# Patient Record
Sex: FEMALE | Race: BLACK OR AFRICAN AMERICAN | Employment: FULL TIME | URBAN - METROPOLITAN AREA
[De-identification: names, ages, dates, MRNs, and addresses within clinical notes are randomized per-mention and may not be internally consistent; named-entity substitution may affect disease eponyms.]

---

## 2017-01-31 ENCOUNTER — OFFICE VISIT (OUTPATIENT)
Dept: FAMILY MEDICINE CLINIC | Facility: CLINIC | Age: 52
End: 2017-01-31

## 2017-01-31 VITALS
HEART RATE: 81 BPM | SYSTOLIC BLOOD PRESSURE: 111 MMHG | HEIGHT: 66 IN | RESPIRATION RATE: 20 BRPM | WEIGHT: 142 LBS | DIASTOLIC BLOOD PRESSURE: 75 MMHG | TEMPERATURE: 98 F | BODY MASS INDEX: 22.82 KG/M2

## 2017-01-31 DIAGNOSIS — H66.92 ACUTE EAR INFECTION, LEFT: ICD-10-CM

## 2017-01-31 DIAGNOSIS — J06.9 ACUTE URI: ICD-10-CM

## 2017-01-31 PROCEDURE — 99213 OFFICE O/P EST LOW 20 MIN: CPT | Performed by: FAMILY MEDICINE

## 2017-01-31 PROCEDURE — 99212 OFFICE O/P EST SF 10 MIN: CPT | Performed by: FAMILY MEDICINE

## 2017-01-31 RX ORDER — AMOXICILLIN 875 MG/1
875 TABLET, COATED ORAL 2 TIMES DAILY
Qty: 20 TABLET | Refills: 0 | Status: SHIPPED | OUTPATIENT
Start: 2017-01-31 | End: 2017-10-04

## 2017-01-31 NOTE — PROGRESS NOTES
HPI:    Patient ID: Alberto Neumann is a 46year old female. HPI Comments: Pt presents with cold symptoms for 4-5 days. Pt has had cough, sore throat and left ear pain. No fevers. Pt has tried otc remedies without relief. Pt states no sick contacts. (875 mg total) by mouth 2 (two) times daily.            Imaging & Referrals:  None       PM#[id number]

## 2017-08-28 ENCOUNTER — APPOINTMENT (OUTPATIENT)
Dept: CT IMAGING | Facility: HOSPITAL | Age: 52
End: 2017-08-28
Attending: EMERGENCY MEDICINE
Payer: COMMERCIAL

## 2017-08-28 ENCOUNTER — HOSPITAL ENCOUNTER (EMERGENCY)
Facility: HOSPITAL | Age: 52
Discharge: HOME | End: 2017-08-28
Attending: EMERGENCY MEDICINE
Payer: COMMERCIAL

## 2017-08-28 VITALS
HEIGHT: 66 IN | RESPIRATION RATE: 16 BRPM | BODY MASS INDEX: 22.02 KG/M2 | SYSTOLIC BLOOD PRESSURE: 108 MMHG | DIASTOLIC BLOOD PRESSURE: 69 MMHG | HEART RATE: 85 BPM | OXYGEN SATURATION: 97 % | WEIGHT: 137 LBS

## 2017-08-28 DIAGNOSIS — R10.9 ABDOMINAL PAIN, ACUTE: Primary | ICD-10-CM

## 2017-08-28 LAB
ANION GAP SERPL CALC-SCNC: 8 MMOL/L (ref 0–18)
BASOPHILS # BLD: 0.1 K/UL (ref 0–0.2)
BASOPHILS NFR BLD: 1 %
BILIRUB UR QL: NEGATIVE
BUN SERPL-MCNC: 8 MG/DL (ref 8–20)
BUN/CREAT SERPL: 10.8 (ref 10–20)
CALCIUM SERPL-MCNC: 9.4 MG/DL (ref 8.5–10.5)
CHLORIDE SERPL-SCNC: 107 MMOL/L (ref 95–110)
CO2 SERPL-SCNC: 26 MMOL/L (ref 22–32)
COLOR UR: YELLOW
CREAT SERPL-MCNC: 0.74 MG/DL (ref 0.5–1.5)
EOSINOPHIL # BLD: 0.2 K/UL (ref 0–0.7)
EOSINOPHIL NFR BLD: 2 %
ERYTHROCYTE [DISTWIDTH] IN BLOOD BY AUTOMATED COUNT: 13.3 % (ref 11–15)
GLUCOSE SERPL-MCNC: 102 MG/DL (ref 70–99)
GLUCOSE UR-MCNC: NEGATIVE MG/DL
HCT VFR BLD AUTO: 38 % (ref 35–48)
HGB BLD-MCNC: 12.7 G/DL (ref 12–16)
HGB UR QL STRIP.AUTO: NEGATIVE
LEUKOCYTE ESTERASE UR QL STRIP.AUTO: NEGATIVE
LYMPHOCYTES # BLD: 1.1 K/UL (ref 1–4)
LYMPHOCYTES NFR BLD: 11 %
MCH RBC QN AUTO: 30 PG (ref 27–32)
MCHC RBC AUTO-ENTMCNC: 33.4 G/DL (ref 32–37)
MCV RBC AUTO: 89.8 FL (ref 80–100)
MONOCYTES # BLD: 1.1 K/UL (ref 0–1)
MONOCYTES NFR BLD: 11 %
NEUTROPHILS # BLD AUTO: 7.6 K/UL (ref 1.8–7.7)
NEUTROPHILS NFR BLD: 76 %
NITRITE UR QL STRIP.AUTO: NEGATIVE
OSMOLALITY UR CALC.SUM OF ELEC: 291 MOSM/KG (ref 275–295)
PH UR: 5 [PH] (ref 5–8)
PLATELET # BLD AUTO: 229 K/UL (ref 140–400)
PMV BLD AUTO: 7 FL (ref 7.4–10.3)
POTASSIUM SERPL-SCNC: 3.9 MMOL/L (ref 3.3–5.1)
PROT UR-MCNC: 30 MG/DL
RBC # BLD AUTO: 4.24 M/UL (ref 3.7–5.4)
RBC #/AREA URNS AUTO: 7 /HPF
SODIUM SERPL-SCNC: 141 MMOL/L (ref 136–144)
SP GR UR STRIP: 1.03 (ref 1–1.03)
UROBILINOGEN UR STRIP-ACNC: <2
VIT C UR-MCNC: 40 MG/DL
WBC # BLD AUTO: 10 K/UL (ref 4–11)
WBC #/AREA URNS AUTO: <1 /HPF

## 2017-08-28 PROCEDURE — 81001 URINALYSIS AUTO W/SCOPE: CPT | Performed by: EMERGENCY MEDICINE

## 2017-08-28 PROCEDURE — 80048 BASIC METABOLIC PNL TOTAL CA: CPT | Performed by: EMERGENCY MEDICINE

## 2017-08-28 PROCEDURE — 96374 THER/PROPH/DIAG INJ IV PUSH: CPT

## 2017-08-28 PROCEDURE — 96376 TX/PRO/DX INJ SAME DRUG ADON: CPT

## 2017-08-28 PROCEDURE — 74177 CT ABD & PELVIS W/CONTRAST: CPT | Performed by: EMERGENCY MEDICINE

## 2017-08-28 PROCEDURE — 85025 COMPLETE CBC W/AUTO DIFF WBC: CPT | Performed by: EMERGENCY MEDICINE

## 2017-08-28 PROCEDURE — 99284 EMERGENCY DEPT VISIT MOD MDM: CPT

## 2017-08-28 PROCEDURE — 96375 TX/PRO/DX INJ NEW DRUG ADDON: CPT

## 2017-08-28 RX ORDER — MORPHINE SULFATE 4 MG/ML
4 INJECTION, SOLUTION INTRAMUSCULAR; INTRAVENOUS ONCE
Status: COMPLETED | OUTPATIENT
Start: 2017-08-28 | End: 2017-08-28

## 2017-08-28 RX ORDER — MORPHINE SULFATE 4 MG/ML
INJECTION, SOLUTION INTRAMUSCULAR; INTRAVENOUS
Status: DISCONTINUED
Start: 2017-08-28 | End: 2017-08-28

## 2017-08-28 RX ORDER — CIPROFLOXACIN 500 MG/1
500 TABLET, FILM COATED ORAL 2 TIMES DAILY
Qty: 20 TABLET | Refills: 0 | Status: SHIPPED | OUTPATIENT
Start: 2017-08-28 | End: 2017-10-20

## 2017-08-28 RX ORDER — ONDANSETRON 2 MG/ML
4 INJECTION INTRAMUSCULAR; INTRAVENOUS ONCE
Status: COMPLETED | OUTPATIENT
Start: 2017-08-28 | End: 2017-08-28

## 2017-08-28 RX ORDER — METRONIDAZOLE 500 MG/1
500 TABLET ORAL 3 TIMES DAILY
Qty: 30 TABLET | Refills: 0 | Status: SHIPPED | OUTPATIENT
Start: 2017-08-28 | End: 2017-10-20

## 2017-08-28 RX ORDER — HYDROCODONE BITARTRATE AND ACETAMINOPHEN 5; 325 MG/1; MG/1
1-2 TABLET ORAL EVERY 4 HOURS PRN
Qty: 20 TABLET | Refills: 0 | Status: SHIPPED | OUTPATIENT
Start: 2017-08-28 | End: 2017-09-04

## 2017-08-28 NOTE — ED PROVIDER NOTES
Patient signed out to me pending CT scan. Mild diverticulitis on CT scan, normal labs and no fever. Abdomen soft with no rebound or guarding.   Patient comfortable with plan for discharge home with oral antibiotics Norco as needed for pain but MiraLAX for

## 2017-08-28 NOTE — ED PROVIDER NOTES
Patient Seen in: [facility] AND [facility] Emergency Department    History   Patient presents with:  Abdomen/Flank Pain (GI/)    Stated Complaint: abd pain    HPI    47-year-old female with abdominal pain that began on Friday and has worsened since the onset. Wt 62.1 kg   LMP 06/29/2015   SpO2 97%   BMI 22.11 kg/m²         Physical Exam   Constitutional: She is oriented to person, place, and time. She appears well-developed and well-nourished. No distress. HENT:   Head: Normocephalic and atraumatic.    Mouth/T Status                     ---------                               -----------         ------                     CBC W/ DIFFERENTIAL[[id number]]          Abnormal            Final result                 Please view results for these tests on the individual

## 2017-08-28 NOTE — ED INITIAL ASSESSMENT (HPI)
LLQ abdominal pain since Friday- worsening tonight. +nausea, denies any fever/chills, no urinary complaints.

## 2017-08-28 NOTE — ED NOTES
Pt safe to d/c home per MD, able to dress self.  D/C teaching completed, pt verbalizes understanding, ambulatory with steady gait to exit

## 2017-09-01 ENCOUNTER — TELEPHONE (OUTPATIENT)
Dept: GASTROENTEROLOGY | Facility: CLINIC | Age: 52
End: 2017-09-01

## 2017-09-01 NOTE — TELEPHONE ENCOUNTER
Pt states that she was in ER on 8/28/17 due to diverticulitis attack and was told to follow up with PL. No appts available with PL or [facility]. Pt also states she has not had bowel movement since last Saturday. Please call.

## 2017-09-01 NOTE — TELEPHONE ENCOUNTER
Pt contacted and she states she presented to the ED on 8/28/17 for diverticulitis attack. She was d/c with metronidazole 500mg TID and ciprofloxacin 500mg BID. CT a/p demonstrated diverticulitis of the sigmoid colon w/o abcess or perforation.   She states s

## 2017-09-01 NOTE — TELEPHONE ENCOUNTER
Discussed w/ Dr. Pineda VELEZ for pt to keep 9/21/17 @ 4:30pm appt. I called pt and she was agreeable, pt added to MD schedule, directions provided to the [facility], and told to arrive 15 mins earlier.

## 2017-09-01 NOTE — TELEPHONE ENCOUNTER
The patient was contacted. She actually is feeling much better on the antibiotics. She was out eating a light dinner this evening. She does have some nausea.   She was concerned about taking 2 antibiotics but I reassured her that this is the standard of

## 2017-09-21 ENCOUNTER — OFFICE VISIT (OUTPATIENT)
Dept: GASTROENTEROLOGY | Facility: CLINIC | Age: 52
End: 2017-09-21

## 2017-09-21 VITALS
DIASTOLIC BLOOD PRESSURE: 93 MMHG | SYSTOLIC BLOOD PRESSURE: 133 MMHG | HEART RATE: 88 BPM | BODY MASS INDEX: 22.63 KG/M2 | HEIGHT: 66 IN | WEIGHT: 140.81 LBS

## 2017-09-21 DIAGNOSIS — K57.32 DIVERTICULITIS OF LARGE INTESTINE WITHOUT PERFORATION OR ABSCESS WITHOUT BLEEDING: Primary | ICD-10-CM

## 2017-09-21 PROCEDURE — 99212 OFFICE O/P EST SF 10 MIN: CPT | Performed by: INTERNAL MEDICINE

## 2017-09-21 PROCEDURE — 99213 OFFICE O/P EST LOW 20 MIN: CPT | Performed by: INTERNAL MEDICINE

## 2017-09-21 NOTE — PATIENT INSTRUCTIONS
Diverticulitis  - call if symptoms return- we can call in antibiotics before it gets too bad  - continue on high fiber diet  - options discussed if ongoing episodes

## 2017-09-21 NOTE — PROGRESS NOTES
Pat Player is a 45year old female. HPI:   Patient presents with:  Hospital F/U: diverticulitis. No current complaints. The patient is a 20-year-old female who presents after episode of recurrent diverticulitis.   She was in the emergency room diverticulitis. This current episode was uncomplicated and treated with outpatient oral antibiotics. The patient's last colonoscopy was 2 years ago. The patient currently denies any abdominal pain. She has occasional looser stools.     Plan- we discus

## 2017-10-04 ENCOUNTER — OFFICE VISIT (OUTPATIENT)
Dept: FAMILY MEDICINE CLINIC | Facility: CLINIC | Age: 52
End: 2017-10-04

## 2017-10-04 VITALS
SYSTOLIC BLOOD PRESSURE: 117 MMHG | WEIGHT: 142 LBS | HEIGHT: 66 IN | HEART RATE: 77 BPM | BODY MASS INDEX: 22.82 KG/M2 | RESPIRATION RATE: 18 BRPM | DIASTOLIC BLOOD PRESSURE: 77 MMHG | TEMPERATURE: 98 F

## 2017-10-04 DIAGNOSIS — J01.90 ACUTE SINUSITIS, RECURRENCE NOT SPECIFIED, UNSPECIFIED LOCATION: ICD-10-CM

## 2017-10-04 PROCEDURE — 99212 OFFICE O/P EST SF 10 MIN: CPT | Performed by: FAMILY MEDICINE

## 2017-10-04 PROCEDURE — 99213 OFFICE O/P EST LOW 20 MIN: CPT | Performed by: FAMILY MEDICINE

## 2017-10-04 RX ORDER — AMOXICILLIN 875 MG/1
875 TABLET, COATED ORAL 2 TIMES DAILY
Qty: 20 TABLET | Refills: 0 | Status: SHIPPED | OUTPATIENT
Start: 2017-10-04 | End: 2017-11-07 | Stop reason: ALTCHOICE

## 2017-10-04 NOTE — PROGRESS NOTES
HPI:    Patient ID: Jacinta Alvarez is a 45year old female. Pt presents with cold symptoms for several days. Pt has had cough, PND, rhinorrhea, sore throat. No fevers. Pt has tried otc remedies without consistent relief. Pt states sick contacts at work.

## 2017-10-20 ENCOUNTER — TELEPHONE (OUTPATIENT)
Dept: GASTROENTEROLOGY | Facility: CLINIC | Age: 52
End: 2017-10-20

## 2017-10-20 RX ORDER — METRONIDAZOLE 500 MG/1
500 TABLET ORAL 3 TIMES DAILY
Qty: 30 TABLET | Refills: 0 | Status: SHIPPED | OUTPATIENT
Start: 2017-10-20 | End: 2017-10-30

## 2017-10-20 RX ORDER — CIPROFLOXACIN 500 MG/1
500 TABLET, FILM COATED ORAL 2 TIMES DAILY
Qty: 20 TABLET | Refills: 0 | Status: SHIPPED | OUTPATIENT
Start: 2017-10-20 | End: 2017-10-30

## 2017-10-20 NOTE — TELEPHONE ENCOUNTER
The patient was contacted, she has been experiencing bloating and abdominal pain is similar to her prior presentation with diverticulitis in the sigmoid colon. She feels it is very early.     Plan  -[place] diet, go to liquids if necessary  -Cipro and Flagyl

## 2017-10-20 NOTE — TELEPHONE ENCOUNTER
Spoke to the pt over the phone. She thinks she is having a diverticulitis flare.      Her last attack was 09/01/17    She ws treated with Cipro and Flagyl    She feels bloated     She feels the urge to have a bowel movement but can't completely evacuate, no

## 2017-10-20 NOTE — TELEPHONE ENCOUNTER
Pt states that she is very bloated and is having problems having bowel movement and suspects that it might be the beginning of a diverticulitis flare. Please call.

## 2017-10-24 ENCOUNTER — OFFICE VISIT (OUTPATIENT)
Dept: FAMILY MEDICINE CLINIC | Facility: CLINIC | Age: 52
End: 2017-10-24

## 2017-10-24 VITALS
BODY MASS INDEX: 22.82 KG/M2 | WEIGHT: 142 LBS | TEMPERATURE: 98 F | RESPIRATION RATE: 20 BRPM | SYSTOLIC BLOOD PRESSURE: 119 MMHG | HEART RATE: 101 BPM | HEIGHT: 66 IN | DIASTOLIC BLOOD PRESSURE: 72 MMHG

## 2017-10-24 DIAGNOSIS — J06.9 ACUTE URI: ICD-10-CM

## 2017-10-24 PROCEDURE — 99213 OFFICE O/P EST LOW 20 MIN: CPT | Performed by: FAMILY MEDICINE

## 2017-10-24 PROCEDURE — 99212 OFFICE O/P EST SF 10 MIN: CPT | Performed by: FAMILY MEDICINE

## 2017-10-24 RX ORDER — CODEINE PHOSPHATE AND GUAIFENESIN 10; 100 MG/5ML; MG/5ML
5 SOLUTION ORAL EVERY 6 HOURS PRN
Qty: 140 ML | Refills: 0 | Status: SHIPPED | OUTPATIENT
Start: 2017-10-24 | End: 2017-11-07 | Stop reason: ALTCHOICE

## 2017-11-06 NOTE — PROGRESS NOTES
[facility] Follow-up Visit    Angelica abdominal pain, nausea, vomiting, dyspepsia, dysphagia, odynophagia. Denies change in appetite or unexpected weight loss. Denies chest pain or shortness of breath.     Patient reports her recent diverticulitis episodes seem to occur around the same time h of breath  CARDIOVASCULAR:  negative for  chest pain  GASTROINTESTINAL:  see HPI  GENITOURINARY:  negative for dysuria and hematuria   SKIN:  negative for  rash  ALLERGIC/IMMUNOLOGIC:  negative for hay fever or seasonal allergies  ENDOCRINE:  negative for dietary/over-the-counter fiber products. Given the patient's recent 2 episodes of diverticulitis in such close proximity, would recommend that the patient follow-up with general surgery to reevaluate possible surgical options.   Reviewed this patient's agnes

## 2017-11-07 ENCOUNTER — OFFICE VISIT (OUTPATIENT)
Dept: GASTROENTEROLOGY | Facility: CLINIC | Age: 52
End: 2017-11-07

## 2017-11-07 VITALS
DIASTOLIC BLOOD PRESSURE: 80 MMHG | HEIGHT: 66 IN | BODY MASS INDEX: 23.46 KG/M2 | HEART RATE: 99 BPM | SYSTOLIC BLOOD PRESSURE: 118 MMHG | WEIGHT: 146 LBS

## 2017-11-07 DIAGNOSIS — Z87.19 HISTORY OF DIVERTICULITIS: Primary | ICD-10-CM

## 2017-11-07 PROCEDURE — 99213 OFFICE O/P EST LOW 20 MIN: CPT | Performed by: NURSE PRACTITIONER

## 2017-11-07 PROCEDURE — 99212 OFFICE O/P EST SF 10 MIN: CPT | Performed by: NURSE PRACTITIONER

## 2017-11-07 NOTE — PATIENT INSTRUCTIONS
1.  Continue a low residue diet for at least the next few weeks and then gradually reintroduce dietary/over-the-counter fiber  2.   Follow-up with general surgery to see if they have any additional recommendations in light of her recent diverticulitis episo

## 2017-11-28 ENCOUNTER — NURSE TRIAGE (OUTPATIENT)
Dept: OTHER | Age: 52
End: 2017-11-28

## 2017-11-28 NOTE — TELEPHONE ENCOUNTER
Action Requested: Summary for Provider     []  Critical Lab, Recommendations Needed  [] Need Additional Advice  [x]   FYI    []   Need Orders  [] Need Medications Sent to Pharmacy  []  Other     SUMMARY: Patient stated she will go to Urgent Care in [address] [place] [address]

## 2017-11-29 NOTE — TELEPHONE ENCOUNTER
Pt contacted (Name and  verified) and states that she did go to her local urgent care and was told that she has a bladder infection and was prescribed Bactrim 160 mg BID x 10 days and another \"small orange pill\" for pain.     Pt states that she is feel

## 2017-12-21 ENCOUNTER — OFFICE VISIT (OUTPATIENT)
Dept: FAMILY MEDICINE CLINIC | Facility: CLINIC | Age: 52
End: 2017-12-21

## 2017-12-21 VITALS
TEMPERATURE: 98 F | HEART RATE: 89 BPM | SYSTOLIC BLOOD PRESSURE: 102 MMHG | BODY MASS INDEX: 24.11 KG/M2 | RESPIRATION RATE: 18 BRPM | HEIGHT: 66 IN | WEIGHT: 150 LBS | DIASTOLIC BLOOD PRESSURE: 70 MMHG

## 2017-12-21 DIAGNOSIS — J01.90 ACUTE SINUSITIS, RECURRENCE NOT SPECIFIED, UNSPECIFIED LOCATION: ICD-10-CM

## 2017-12-21 PROCEDURE — 99213 OFFICE O/P EST LOW 20 MIN: CPT | Performed by: FAMILY MEDICINE

## 2017-12-21 PROCEDURE — 99212 OFFICE O/P EST SF 10 MIN: CPT | Performed by: FAMILY MEDICINE

## 2017-12-21 RX ORDER — AMOXICILLIN 875 MG/1
875 TABLET, COATED ORAL 2 TIMES DAILY
Qty: 20 TABLET | Refills: 0 | Status: SHIPPED | OUTPATIENT
Start: 2017-12-21 | End: 2018-02-21

## 2017-12-21 NOTE — PROGRESS NOTES
HPI:    Patient ID: Eleuterio Romero is a 45year old female. Pt presents with cold  Sinus symptoms for 2-3 days. Pt has had sinus congestion, sore throat and episode of vomiting today. No diarrhea but had fevers. Pt has tried otc remedies without relief. total) by mouth 2 (two) times daily.            Imaging & Referrals:  None       #[id number]

## 2018-02-21 ENCOUNTER — OFFICE VISIT (OUTPATIENT)
Dept: FAMILY MEDICINE CLINIC | Facility: CLINIC | Age: 53
End: 2018-02-21

## 2018-02-21 VITALS
BODY MASS INDEX: 23.63 KG/M2 | WEIGHT: 147 LBS | HEIGHT: 66 IN | DIASTOLIC BLOOD PRESSURE: 81 MMHG | TEMPERATURE: 98 F | SYSTOLIC BLOOD PRESSURE: 143 MMHG | RESPIRATION RATE: 22 BRPM | HEART RATE: 112 BPM

## 2018-02-21 DIAGNOSIS — S39.012A STRAIN OF LUMBAR REGION, INITIAL ENCOUNTER: Primary | ICD-10-CM

## 2018-02-21 PROCEDURE — 99212 OFFICE O/P EST SF 10 MIN: CPT | Performed by: FAMILY MEDICINE

## 2018-02-21 PROCEDURE — 99213 OFFICE O/P EST LOW 20 MIN: CPT | Performed by: FAMILY MEDICINE

## 2018-02-21 RX ORDER — HYDROCODONE BITARTRATE AND ACETAMINOPHEN 5; 325 MG/1; MG/1
1 TABLET ORAL EVERY 6 HOURS PRN
Qty: 30 TABLET | Refills: 0 | Status: SHIPPED | OUTPATIENT
Start: 2018-02-21 | End: 2018-04-13 | Stop reason: ALTCHOICE

## 2018-02-21 RX ORDER — CYCLOBENZAPRINE HCL 10 MG
10 TABLET ORAL NIGHTLY
Qty: 15 TABLET | Refills: 0 | Status: SHIPPED | OUTPATIENT
Start: 2018-02-21 | End: 2018-04-13 | Stop reason: ALTCHOICE

## 2018-02-21 NOTE — PROGRESS NOTES
HPI:    Patient ID: Fletcher Upton is a 45year old female. Pt presents with lower back pain for 1-2 weeks. Pt was exercising and doing a plank and felt a twinge of pain and went to a chiropractor and felt worse after treatment.  Pt has had aches and tig may causes sedation, constipation. Not to operate heavy machinery or drive on medication.            Imaging & Referrals:  None       NB#[id number]

## 2018-04-13 ENCOUNTER — NURSE TRIAGE (OUTPATIENT)
Dept: OTHER | Age: 53
End: 2018-04-13

## 2018-04-13 ENCOUNTER — HOSPITAL ENCOUNTER (OUTPATIENT)
Age: 53
Discharge: HOME | End: 2018-04-13
Attending: EMERGENCY MEDICINE
Payer: COMMERCIAL

## 2018-04-13 VITALS
TEMPERATURE: 98 F | DIASTOLIC BLOOD PRESSURE: 80 MMHG | HEART RATE: 88 BPM | RESPIRATION RATE: 18 BRPM | SYSTOLIC BLOOD PRESSURE: 118 MMHG

## 2018-04-13 DIAGNOSIS — N30.01 ACUTE CYSTITIS WITH HEMATURIA: Primary | ICD-10-CM

## 2018-04-13 PROCEDURE — 99214 OFFICE O/P EST MOD 30 MIN: CPT

## 2018-04-13 PROCEDURE — 87086 URINE CULTURE/COLONY COUNT: CPT | Performed by: EMERGENCY MEDICINE

## 2018-04-13 PROCEDURE — 81002 URINALYSIS NONAUTO W/O SCOPE: CPT

## 2018-04-13 RX ORDER — SULFAMETHOXAZOLE AND TRIMETHOPRIM 800; 160 MG/1; MG/1
1 TABLET ORAL 2 TIMES DAILY
Qty: 14 TABLET | Refills: 0 | Status: SHIPPED | OUTPATIENT
Start: 2018-04-13 | End: 2018-04-20

## 2018-04-13 NOTE — TELEPHONE ENCOUNTER
Action Requested: Summary for Provider     []  Critical Lab, Recommendations Needed  [] Need Additional Advice  []   FYI    []   Need Orders  [] Need Medications Sent to Pharmacy  []  Other     SUMMARY: Pt with 2 days urinary burning, frequency, urgency.  Wendy Lightning

## 2018-04-13 NOTE — ED PROVIDER NOTES
Patient Seen in: [facility] In [address] [place] [address]    History   Patient presents with:  Urinary Symptoms (urologic)    Stated Complaint: UTI Symptoms    HPI    The patient is a 68-year-old female with past history of diverticulitis who present tenderness  Skin: No pallor, no redness or warmth to the touch      ED Course     Labs Reviewed   [facility] POCT URINALYSIS DIPSTICK MANUAL - Abnormal; Notable for the following:        Result Value    Blood, Urine Trace-Intact (*)     Leukocyte esterase urine S

## 2018-04-21 ENCOUNTER — OFFICE VISIT (OUTPATIENT)
Dept: FAMILY MEDICINE CLINIC | Facility: CLINIC | Age: 53
End: 2018-04-21

## 2018-04-21 VITALS
TEMPERATURE: 98 F | SYSTOLIC BLOOD PRESSURE: 118 MMHG | WEIGHT: 146 LBS | DIASTOLIC BLOOD PRESSURE: 77 MMHG | RESPIRATION RATE: 20 BRPM | HEART RATE: 93 BPM | BODY MASS INDEX: 23.46 KG/M2 | HEIGHT: 66 IN

## 2018-04-21 DIAGNOSIS — Z12.31 SCREENING MAMMOGRAM, ENCOUNTER FOR: ICD-10-CM

## 2018-04-21 DIAGNOSIS — N76.0 BACTERIAL VAGINOSIS: ICD-10-CM

## 2018-04-21 DIAGNOSIS — Z00.00 ROUTINE PHYSICAL EXAMINATION: ICD-10-CM

## 2018-04-21 DIAGNOSIS — B96.89 BACTERIAL VAGINOSIS: ICD-10-CM

## 2018-04-21 PROCEDURE — 99212 OFFICE O/P EST SF 10 MIN: CPT | Performed by: FAMILY MEDICINE

## 2018-04-21 PROCEDURE — 99213 OFFICE O/P EST LOW 20 MIN: CPT | Performed by: FAMILY MEDICINE

## 2018-04-21 RX ORDER — METRONIDAZOLE 500 MG/1
500 TABLET ORAL 2 TIMES DAILY
Qty: 14 TABLET | Refills: 0 | Status: SHIPPED | OUTPATIENT
Start: 2018-04-21 | End: 2018-10-04

## 2018-04-21 NOTE — PROGRESS NOTES
HPI:    Patient ID: Chinmay Gupta is a 52year old female. Pt presents as she will be going out of town for 6 weeks. Pt has hx of bacterial vaginosis and would like a refill for her flagyl as she has had a little discharge and / odor.  Pt will schedule

## 2018-04-29 ENCOUNTER — HOSPITAL ENCOUNTER (OUTPATIENT)
Dept: MAMMOGRAPHY | Facility: HOSPITAL | Age: 53
Discharge: HOME | End: 2018-04-29
Attending: FAMILY MEDICINE
Payer: COMMERCIAL

## 2018-04-29 DIAGNOSIS — Z12.31 SCREENING MAMMOGRAM, ENCOUNTER FOR: ICD-10-CM

## 2018-04-29 PROCEDURE — 77067 SCR MAMMO BI INCL CAD: CPT | Performed by: FAMILY MEDICINE

## 2018-05-02 ENCOUNTER — HOSPITAL ENCOUNTER (OUTPATIENT)
Dept: ULTRASOUND IMAGING | Facility: HOSPITAL | Age: 53
Discharge: HOME | End: 2018-05-02
Attending: FAMILY MEDICINE
Payer: COMMERCIAL

## 2018-05-02 ENCOUNTER — HOSPITAL ENCOUNTER (OUTPATIENT)
Dept: MAMMOGRAPHY | Facility: HOSPITAL | Age: 53
Discharge: HOME | End: 2018-05-02
Attending: FAMILY MEDICINE
Payer: COMMERCIAL

## 2018-05-02 DIAGNOSIS — R92.8 ABNORMAL MAMMOGRAM: ICD-10-CM

## 2018-05-02 PROCEDURE — 76642 ULTRASOUND BREAST LIMITED: CPT | Performed by: FAMILY MEDICINE

## 2018-05-02 PROCEDURE — 77066 DX MAMMO INCL CAD BI: CPT | Performed by: FAMILY MEDICINE

## 2018-05-03 ENCOUNTER — TELEPHONE (OUTPATIENT)
Dept: FAMILY MEDICINE CLINIC | Facility: CLINIC | Age: 53
End: 2018-05-03

## 2018-05-03 ENCOUNTER — NURSE NAVIGATOR ENCOUNTER (OUTPATIENT)
Dept: HEMATOLOGY/ONCOLOGY | Facility: HOSPITAL | Age: 53
End: 2018-05-03

## 2018-05-03 NOTE — TELEPHONE ENCOUNTER
Dr. Mauro Holter signed patient extended mammogram view form for biopsy, form were faxed to [phone]. Confirmation recd.

## 2018-05-03 NOTE — PROGRESS NOTES
Patient presented for breast imaging and was recommended for the following:  RECOMMENDATIONS:   STEREOTACTIC BREAST BIOPSY: RIGHT BREAST. ULTRASOUND-GUIDED BIOPSY: LEFT BREAST. Discussed recommended breast biopsy with patient.     Reviewed pertin medication into the area and then use a needle to collect cells from the site. A marker, or clip, will then be placed in the biopsied area. This marker is placed so this biopsy site is able to be accurately located upon future breast imaging.   After the

## 2018-05-04 RX ORDER — MAGNESIUM HYDROXIDE 1200 MG/15ML
250 LIQUID ORAL CONTINUOUS
Status: DISCONTINUED | OUTPATIENT
Start: 2018-05-07 | End: 2018-05-09

## 2018-05-04 RX ORDER — LIDOCAINE HYDROCHLORIDE AND EPINEPHRINE 10; 10 MG/ML; UG/ML
20 INJECTION, SOLUTION INFILTRATION; PERINEURAL ONCE
Status: COMPLETED | OUTPATIENT
Start: 2018-05-07 | End: 2018-05-07

## 2018-05-04 RX ORDER — LIDOCAINE HYDROCHLORIDE 10 MG/ML
10 INJECTION, SOLUTION EPIDURAL; INFILTRATION; INTRACAUDAL; PERINEURAL ONCE
Status: COMPLETED | OUTPATIENT
Start: 2018-05-07 | End: 2018-05-07

## 2018-05-07 ENCOUNTER — HOSPITAL ENCOUNTER (OUTPATIENT)
Dept: MAMMOGRAPHY | Facility: HOSPITAL | Age: 53
Discharge: HOME | End: 2018-05-07
Attending: FAMILY MEDICINE
Payer: COMMERCIAL

## 2018-05-07 ENCOUNTER — HOSPITAL ENCOUNTER (OUTPATIENT)
Dept: ULTRASOUND IMAGING | Facility: HOSPITAL | Age: 53
Discharge: HOME | End: 2018-05-07
Attending: FAMILY MEDICINE
Payer: COMMERCIAL

## 2018-05-07 VITALS — RESPIRATION RATE: 16 BRPM | HEART RATE: 95 BPM | DIASTOLIC BLOOD PRESSURE: 93 MMHG | SYSTOLIC BLOOD PRESSURE: 132 MMHG

## 2018-05-07 VITALS — SYSTOLIC BLOOD PRESSURE: 126 MMHG | DIASTOLIC BLOOD PRESSURE: 76 MMHG | RESPIRATION RATE: 16 BRPM | HEART RATE: 78 BPM

## 2018-05-07 DIAGNOSIS — R92.8 ABNORMAL MAMMOGRAM: ICD-10-CM

## 2018-05-07 DIAGNOSIS — R92.0 ABNORMAL MAMMOGRAM WITH MICROCALCIFICATION: ICD-10-CM

## 2018-05-07 DIAGNOSIS — N63.0 BREAST NODULE: ICD-10-CM

## 2018-05-07 DIAGNOSIS — R92.1 BREAST CALCIFICATIONS: ICD-10-CM

## 2018-05-07 PROCEDURE — 88305 TISSUE EXAM BY PATHOLOGIST: CPT | Performed by: FAMILY MEDICINE

## 2018-05-07 PROCEDURE — 77065 DX MAMMO INCL CAD UNI: CPT | Performed by: FAMILY MEDICINE

## 2018-05-07 PROCEDURE — 19081 BX BREAST 1ST LESION STRTCTC: CPT | Performed by: FAMILY MEDICINE

## 2018-05-07 PROCEDURE — 19083 BX BREAST 1ST LESION US IMAG: CPT | Performed by: FAMILY MEDICINE

## 2018-05-07 PROCEDURE — 77066 DX MAMMO INCL CAD BI: CPT | Performed by: FAMILY MEDICINE

## 2018-05-07 RX ORDER — LIDOCAINE HYDROCHLORIDE AND EPINEPHRINE 10; 10 MG/ML; UG/ML
20 INJECTION, SOLUTION INFILTRATION; PERINEURAL ONCE
Status: COMPLETED | OUTPATIENT
Start: 2018-05-07 | End: 2018-05-07

## 2018-05-07 RX ORDER — LIDOCAINE HYDROCHLORIDE AND EPINEPHRINE 10; 10 MG/ML; UG/ML
INJECTION, SOLUTION INFILTRATION; PERINEURAL
Status: COMPLETED
Start: 2018-05-07 | End: 2018-05-07

## 2018-05-07 RX ADMIN — MAGNESIUM HYDROXIDE 250 ML: 1200 LIQUID ORAL at 11:00:00

## 2018-05-07 NOTE — IMAGING NOTE
INITIAL MAMMOGRAM COMPLETED BY [email or web address]    DR BUTLER HERE TO EXPLAIN PROCEDURE  AND RISK OF PROCEDURE QUESTIONS ANSWERED    HX TAKEN PROCEDURE EXPLAINED BY DR BUTLER     QUESTIONS ANSWERED PT CONSENTED AT 1135    UP ON TABLE AT [place] [id number]

## 2018-05-07 NOTE — IMAGING NOTE
PT ARRIVED TO ROOM 4.    SCANS BY SHALINI CLARKE TAKEN PROCEDURE EXPLAINED QUESTIONS ANSWERED    PT CONSENTED AT [facility]  [facility] CLEANED CHLORO PREP TO SITE STERILE DRAPE TO SITE PATHOLOGY NOTIFIED  SITE MARKED

## 2018-05-07 NOTE — PROCEDURES
[facility]  Procedure Note    Phan Dixon Patient Status:  Outpatient    3/23/1965 MRN [id number]   [facility] Attending Raúl Fitzpatrick MD   Hosp Day # 0 PCP Sabi Epperson MD     Procedure: Ayah Mchugh

## 2018-09-07 ENCOUNTER — NURSE TRIAGE (OUTPATIENT)
Dept: OTHER | Age: 53
End: 2018-09-07

## 2018-09-07 NOTE — TELEPHONE ENCOUNTER
Action Requested: Summary for Provider     []  Critical Lab, Recommendations Needed  [] Need Additional Advice  []   FYI    []   Need Orders  [] Need Medications Sent to Pharmacy  []  Other     SUMMARY: ***    Reason for call: Numbness  Onset: Sep 7, 2018

## 2018-09-07 NOTE — TELEPHONE ENCOUNTER
Action Requested: Summary for Provider     []  Critical Lab, Recommendations Needed  [] Need Additional Advice  []   FYI    []   Need Orders  [] Need Medications Sent to Pharmacy  []  Other     SUMMARY: With existing Ov schedule with Dr Gurvinder Aguilera tomorrow 9/8/18

## 2018-09-08 ENCOUNTER — OFFICE VISIT (OUTPATIENT)
Dept: FAMILY MEDICINE CLINIC | Facility: CLINIC | Age: 53
End: 2018-09-08
Payer: COMMERCIAL

## 2018-09-08 VITALS
SYSTOLIC BLOOD PRESSURE: 124 MMHG | RESPIRATION RATE: 20 BRPM | DIASTOLIC BLOOD PRESSURE: 88 MMHG | WEIGHT: 154 LBS | TEMPERATURE: 98 F | BODY MASS INDEX: 24.75 KG/M2 | HEART RATE: 90 BPM | HEIGHT: 66 IN

## 2018-09-08 DIAGNOSIS — M79.671 FOOT PAIN, BILATERAL: Primary | ICD-10-CM

## 2018-09-08 DIAGNOSIS — M79.672 FOOT PAIN, BILATERAL: Primary | ICD-10-CM

## 2018-09-08 PROCEDURE — 99212 OFFICE O/P EST SF 10 MIN: CPT | Performed by: FAMILY MEDICINE

## 2018-09-08 PROCEDURE — 99213 OFFICE O/P EST LOW 20 MIN: CPT | Performed by: FAMILY MEDICINE

## 2018-09-08 RX ORDER — NAPROXEN 500 MG/1
500 TABLET ORAL 2 TIMES DAILY WITH MEALS
Qty: 60 TABLET | Refills: 0 | Status: SHIPPED | OUTPATIENT
Start: 2018-09-08 | End: 2018-10-09

## 2018-09-08 NOTE — PROGRESS NOTES
HPI:    Patient ID: Dayan Perry is a 52year old female. Pt presents with hx of plantar fasciitis for several years. Pt had been doing well but has had more pain over the last 3 weeks. Pt denies any trauma or injury.  Pt has been standing more recentl

## 2018-09-11 ENCOUNTER — OFFICE VISIT (OUTPATIENT)
Dept: PODIATRY CLINIC | Facility: CLINIC | Age: 53
End: 2018-09-11
Payer: COMMERCIAL

## 2018-09-11 DIAGNOSIS — M72.2 PLANTAR FASCIITIS, BILATERAL: Primary | ICD-10-CM

## 2018-09-11 PROCEDURE — 99212 OFFICE O/P EST SF 10 MIN: CPT | Performed by: PODIATRIST

## 2018-09-11 PROCEDURE — 99243 OFF/OP CNSLTJ NEW/EST LOW 30: CPT | Performed by: PODIATRIST

## 2018-09-11 NOTE — PROGRESS NOTES
HPI:    Patient ID: Ale Islas is a 52year old female. HPI  This pleasant 18-year-old female presents as a new patient to me on consult from [facility]. Patient's chief complaint is arch and heel pain.   The pain is significant and is been present at i an understanding I will follow-up in 2 weeks with further considerations depending upon how she responds         ASSESSMENT/PLAN:   Plantar fasciitis, bilateral  (primary encounter diagnosis)    No orders of the defined types were placed in this encounter.

## 2018-10-04 ENCOUNTER — NURSE TRIAGE (OUTPATIENT)
Dept: FAMILY MEDICINE CLINIC | Facility: CLINIC | Age: 53
End: 2018-10-04

## 2018-10-04 RX ORDER — METRONIDAZOLE 500 MG/1
500 TABLET ORAL 2 TIMES DAILY
Qty: 14 TABLET | Refills: 0 | Status: SHIPPED | OUTPATIENT
Start: 2018-10-04 | End: 2018-11-20

## 2018-10-04 NOTE — TELEPHONE ENCOUNTER
Action Requested: Summary for Provider     []  Critical Lab, Recommendations Needed  [] Need Additional Advice  []   FYI    []   Need Orders  [x] Need Medications Sent to Pharmacy  []  Other     SUMMARY: Patient requesting antibiotic be sent to her pharmac

## 2018-10-04 NOTE — TELEPHONE ENCOUNTER
Message noted. May start flagyl as requested. Erx sent to listed pharmacy.  To follow up for appointment if not better; Please notify patient

## 2018-10-09 RX ORDER — NAPROXEN 500 MG/1
TABLET ORAL
Qty: 60 TABLET | Refills: 0 | Status: SHIPPED | OUTPATIENT
Start: 2018-10-09 | End: 2019-03-12

## 2018-11-20 ENCOUNTER — NURSE TRIAGE (OUTPATIENT)
Dept: OTHER | Age: 53
End: 2018-11-20

## 2018-11-20 ENCOUNTER — TELEPHONE (OUTPATIENT)
Dept: OTHER | Age: 53
End: 2018-11-20

## 2018-11-20 RX ORDER — NITROFURANTOIN 25; 75 MG/1; MG/1
100 CAPSULE ORAL 2 TIMES DAILY
Qty: 14 CAPSULE | Refills: 0 | Status: SHIPPED | OUTPATIENT
Start: 2018-11-20 | End: 2019-03-12

## 2018-11-20 RX ORDER — METRONIDAZOLE 500 MG/1
500 TABLET ORAL 2 TIMES DAILY
Qty: 14 TABLET | Refills: 0 | Status: SHIPPED | OUTPATIENT
Start: 2018-11-20 | End: 2019-03-12

## 2018-11-20 NOTE — TELEPHONE ENCOUNTER
Action Requested: Summary for Provider     []  Critical Lab, Recommendations Needed  [] Need Additional Advice  []   FYI    []   Need Orders  [] Need Medications Sent to Pharmacy  []  Other     SUMMARY: Pt requesting meds to pharmacy for vaginal symptoms.

## 2018-11-20 NOTE — TELEPHONE ENCOUNTER
Message noted. May start flagyl as requested for BV. Erx sent to listed pharmacy.  To follow up for appointment if not better; Please notify patient

## 2018-11-20 NOTE — TELEPHONE ENCOUNTER
Pt was called and inform of  message below. Pt was inform not to  take the Flagyl if she now if having UTI s/sx as stated below. Pt stated that she will only take the [address].    There is another encounter where pt called and stated that it was BV bu

## 2018-11-20 NOTE — TELEPHONE ENCOUNTER
Patient called back indicated that this morning has been noticing that having urinary frequency/urgency and pain/burning on urination. Some bladder pressure. No blood in the urine. Urine is cloudy. No fevers. No other symptoms.  Patient leaving to [place] a

## 2018-11-20 NOTE — TELEPHONE ENCOUNTER
Spoke with pt informed of Dr. Socorro Garcia  message below. Pt verbalized understanding. Pt had no questions or concerns at present moment.

## 2019-02-04 NOTE — PROGRESS NOTES
EXAMINATION: MRI Abdomen with and without contrast.

   

TECHNIQUE: Axial T1 nonfat sat in and out of phase, axial T2 fat sat, coronal

T2 nonfat sat, axial DWI and ADC MR images of the abdomen were obtained before

and after the administration of 20 cc of gadolinium.  Axial T1 fat sat GRE

dynamic images in precontrast, arterial, venous and delayed phases were

obtained.



CLINICAL HISTORY:Hepatic lesions on CT



COMPARISON: CT abdomen and pelvis 2/2/2019



FINDINGS: Exam limited by motion artifact.



LOWER THORAX: Unremarkable.



LIVER: The hepatic contour is normal..  The liver is enlarged, measuring 21.9

cm in length and the right midclavicular line. 

Diffuse signal dropout of the hepatic parenchyma on out of phase images,

consistent with diffuse steatosis.

Several hepatic lesions are again identified, as follows:

*  Stable 2.8 x 1.4 cm T1 mildly hyperintense, T2 mildly hyperintense focal

lesion in hepatic segment IV (series 3, image 40), which shows signal dropout

on out of phase and fat suppressed images (for example series 3, image 39 and

series 10, image 24), with mild enhancement of the most subcapsular portion

(for example series 10, image 129). No mass effect, and there are normal

vessels coursing through this lesion. No restricted diffusion.

*  Stable ill-defined 4.2 x 2 x 1 cm lesion in the peripheral aspect of hepatic

segment VI, which shows T1 and T2 hyperintensity (series 3, images 50 and

series 7, image 14) and signal dropout on fat-suppressed images (series 5,

image 25). There is wedge-shaped enhancement of the most peripheral/subcapsular

portion (series 10, image 142). No mass effect, and there are normal vessels

coursing through this lesion. No restricted diffusion.

*  Similar mildly T1 and T2 hyperintense lesion in hepatic segment V (series 3,

image 74 and series 7, image 24), which show signal dropout on out of phase

images (series 3, image 73) as well as fat-suppressed images (series 10, image

44). No significant enhancement. No visible distortion or mass effect.

*  Previously described hypoattenuating areas in segment 2-3 and segment 2 near

the capsule are not clearly visualized due to breathing motion artifact.

No suspicious enhancing lesions.

BILIARY: No ductal dilatation or filling defect.  The gallbladder has a normal

appearance.



PANCREAS: No mass or ductal dilatation.



SPLEEN: No splenomegaly.



ADRENALS: No nodules.



KIDNEYS: No hydronephrosis or solid enhancing mass in the imaged portion of the

kidneys.



PERITONEUM / RETROPERITONEUM: No upper abdominal free fluid.



GI TRACT: No bowel dilation.



LYMPH NODES: No upper abdominal lymphadenopathy.



VESSELS: The celiac trunk, superior and inferior mesenteric and bilateral renal

arteries are patent.  The portal, superior mesenteric and splenic veins are

patent.  No collateral circulation.  



BONES AND SOFT TISSUES: No abnormal bone marrow signal.  No soft tissue

abnormalities.



IMPRESSION: 



1. Hepatomegaly with diffuse steatosis.



2. Several hepatic lesions as described which show no mass effect and have

signal characteristics consistent with fat, which favor areas of focal fatty

infiltration. Focal areas of hepatic injury could be considered if there is

prior history of surgery. Hepatic angiomyolipomas or adenomas are less likely,

given the lack of mass effect and enhancement characteristics. No suspicious

enhancing lesions are identified.





Signed by: Dr. Drew Ramirez M.D. on 2/4/2019 4:50 PM HPI:    Patient ID: Gage Azevedo is a 45year old female. Pt presents with cold symptoms for 2 days. Pt has had cough, scratchy sore throat. No fevers. Pt has tried otc remedies without consistent relief.  Pt states no sick contacts but works in a [employer] Pulmonary/Chest: Effort normal and breath sounds normal. No respiratory distress. She has no wheezes. She has no rales. Lymphadenopathy:     She has no cervical adenopathy. Vitals reviewed.              ASSESSMENT/PLAN:   Acute uri:   - After discuss

## 2019-02-20 ENCOUNTER — NURSE TRIAGE (OUTPATIENT)
Dept: OTHER | Age: 54
End: 2019-02-20

## 2019-02-20 RX ORDER — NITROFURANTOIN 25; 75 MG/1; MG/1
100 CAPSULE ORAL 2 TIMES DAILY
Qty: 14 CAPSULE | Refills: 0 | Status: SHIPPED | OUTPATIENT
Start: 2019-02-20 | End: 2019-03-12

## 2019-02-20 NOTE — TELEPHONE ENCOUNTER
Message noted. May start macrobid as requested. Erx sent to listed pharmacy.  To follow up for appointment if not better; Please notify patient

## 2019-02-20 NOTE — TELEPHONE ENCOUNTER
Action Requested: Summary for Provider     []  Critical Lab, Recommendations Needed  [] Need Additional Advice  []   FYI    []   Need Orders  [] Need Medications Sent to Pharmacy  []  Other     SUMMARY: Dr Axel Skiff, please advise.  Patient declined appt, works a

## 2019-03-12 ENCOUNTER — OFFICE VISIT (OUTPATIENT)
Dept: FAMILY MEDICINE CLINIC | Facility: CLINIC | Age: 54
End: 2019-03-12
Payer: COMMERCIAL

## 2019-03-12 VITALS
HEART RATE: 83 BPM | BODY MASS INDEX: 24.59 KG/M2 | SYSTOLIC BLOOD PRESSURE: 106 MMHG | WEIGHT: 153 LBS | TEMPERATURE: 98 F | RESPIRATION RATE: 18 BRPM | HEIGHT: 66 IN | DIASTOLIC BLOOD PRESSURE: 74 MMHG

## 2019-03-12 DIAGNOSIS — J06.9 ACUTE URI: ICD-10-CM

## 2019-03-12 PROCEDURE — 99213 OFFICE O/P EST LOW 20 MIN: CPT | Performed by: FAMILY MEDICINE

## 2019-03-12 PROCEDURE — 99212 OFFICE O/P EST SF 10 MIN: CPT | Performed by: FAMILY MEDICINE

## 2019-03-12 RX ORDER — AMOXICILLIN 875 MG/1
875 TABLET, COATED ORAL 2 TIMES DAILY
Qty: 20 TABLET | Refills: 0 | Status: SHIPPED | OUTPATIENT
Start: 2019-03-12 | End: 2019-06-25

## 2019-03-12 NOTE — PROGRESS NOTES
HPI:    Patient ID: Thi Rivers is a 52year old female. Pt presents with cold symptoms for 3-4  days. Pt has had cough, PND, sore throat, and ear fullness. No fevers. Pt has tried otc remedies with some relief. Pt states sick contacts.            Rev

## 2019-06-25 ENCOUNTER — OFFICE VISIT (OUTPATIENT)
Dept: FAMILY MEDICINE CLINIC | Facility: CLINIC | Age: 54
End: 2019-06-25
Payer: COMMERCIAL

## 2019-06-25 ENCOUNTER — NURSE TRIAGE (OUTPATIENT)
Dept: OTHER | Age: 54
End: 2019-06-25

## 2019-06-25 VITALS
BODY MASS INDEX: 25.07 KG/M2 | DIASTOLIC BLOOD PRESSURE: 82 MMHG | WEIGHT: 156 LBS | HEIGHT: 66 IN | HEART RATE: 92 BPM | TEMPERATURE: 98 F | SYSTOLIC BLOOD PRESSURE: 114 MMHG

## 2019-06-25 DIAGNOSIS — K57.90 DIVERTICULOSIS: Primary | ICD-10-CM

## 2019-06-25 PROCEDURE — 99213 OFFICE O/P EST LOW 20 MIN: CPT | Performed by: PHYSICIAN ASSISTANT

## 2019-06-25 PROCEDURE — 99212 OFFICE O/P EST SF 10 MIN: CPT | Performed by: PHYSICIAN ASSISTANT

## 2019-06-25 RX ORDER — CIPROFLOXACIN 500 MG/1
500 TABLET, FILM COATED ORAL 2 TIMES DAILY
Qty: 14 TABLET | Refills: 0 | Status: SHIPPED | OUTPATIENT
Start: 2019-06-25 | End: 2019-07-02

## 2019-06-25 RX ORDER — METRONIDAZOLE 500 MG/1
500 TABLET ORAL 3 TIMES DAILY
Qty: 21 TABLET | Refills: 0 | Status: SHIPPED | OUTPATIENT
Start: 2019-06-25 | End: 2019-07-02

## 2019-06-25 NOTE — TELEPHONE ENCOUNTER
Action Requested: Summary for Provider     []  Critical Lab, Recommendations Needed  [] Need Additional Advice  [x]   FYI    []   Need Orders  [] Need Medications Sent to Pharmacy  []  Other     SUMMARY:  FYI  and Alan Panda stated that Yesterday in

## 2019-06-25 NOTE — TELEPHONE ENCOUNTER
Future Appointments   Date Time Provider Myra Marinelli   6/25/2019 10:15 AM Hardik Gutierrez [facility] Brianna Chu

## 2019-06-25 NOTE — PROGRESS NOTES
HPI:    Patient ID: Mala Gil is a 62year old female. Patient presents for diverticulitis for the past 1 day. She has a history of diverticulitis.  Denies diarrhea, but has noticed that she is not able to have a bowel movement like she normally rivero Pulmonary/Chest: Effort normal and breath sounds normal. She has no wheezes. She has no rales. Abdominal: Soft. Bowel sounds are normal. She exhibits no distension and no mass. There is tenderness (Lower left quadrant).  There is no rebound and no guard

## 2019-06-25 NOTE — PATIENT INSTRUCTIONS
Miralax is a laxative    Metamucil is a fiber supplement     Fibrous foods like salads, fruits, vegetables etcc     Cipro--> 1 pill at night and 1 pill in the morning for 7 days  Flagyl--> 1 pill 3 times per day for 7 days

## 2019-09-30 ENCOUNTER — OFFICE VISIT (OUTPATIENT)
Dept: FAMILY MEDICINE CLINIC | Facility: CLINIC | Age: 54
End: 2019-09-30
Payer: COMMERCIAL

## 2019-09-30 ENCOUNTER — NURSE TRIAGE (OUTPATIENT)
Dept: OTHER | Age: 54
End: 2019-09-30

## 2019-09-30 VITALS
DIASTOLIC BLOOD PRESSURE: 82 MMHG | HEIGHT: 66 IN | BODY MASS INDEX: 25.55 KG/M2 | HEART RATE: 97 BPM | WEIGHT: 159 LBS | SYSTOLIC BLOOD PRESSURE: 115 MMHG | TEMPERATURE: 98 F

## 2019-09-30 DIAGNOSIS — R30.0 BURNING WITH URINATION: Primary | ICD-10-CM

## 2019-09-30 DIAGNOSIS — K59.00 CONSTIPATION, UNSPECIFIED CONSTIPATION TYPE: ICD-10-CM

## 2019-09-30 LAB
APPEARANCE: CLEAR
BACTERIA UR QL AUTO: NEGATIVE /HPF
BILIRUB UR QL: NEGATIVE
BILIRUBIN: NEGATIVE
CLARITY UR: CLEAR
COLOR UR: YELLOW
GLUCOSE (URINE DIPSTICK): NEGATIVE MG/DL
GLUCOSE UR-MCNC: NEGATIVE MG/DL
HGB UR QL STRIP.AUTO: NEGATIVE
KETONES (URINE DIPSTICK): NEGATIVE MG/DL
KETONES UR-MCNC: NEGATIVE MG/DL
MULTISTIX LOT#: NORMAL NUMERIC
NITRITE UR QL STRIP.AUTO: NEGATIVE
NITRITE, URINE: NEGATIVE
OCCULT BLOOD: NEGATIVE
PH UR: 6 [PH] (ref 5–8)
PH, URINE: 6 (ref 4.5–8)
PROT UR-MCNC: NEGATIVE MG/DL
PROTEIN (URINE DIPSTICK): NEGATIVE MG/DL
RBC #/AREA URNS AUTO: 1 /HPF
SP GR UR STRIP: 1.01 (ref 1–1.03)
SPECIFIC GRAVITY: 1.01 (ref 1–1.03)
URINE-COLOR: YELLOW
UROBILINOGEN UR STRIP-ACNC: <2
UROBILINOGEN,SEMI-QN: 0.2 MG/DL (ref 0–1.9)
WBC #/AREA URNS AUTO: 8 /HPF

## 2019-09-30 PROCEDURE — 99213 OFFICE O/P EST LOW 20 MIN: CPT | Performed by: PHYSICIAN ASSISTANT

## 2019-09-30 PROCEDURE — 81003 URINALYSIS AUTO W/O SCOPE: CPT | Performed by: PHYSICIAN ASSISTANT

## 2019-09-30 RX ORDER — CEPHALEXIN 500 MG/1
500 CAPSULE ORAL 2 TIMES DAILY
Qty: 14 CAPSULE | Refills: 0 | Status: SHIPPED | OUTPATIENT
Start: 2019-09-30 | End: 2019-10-07

## 2019-09-30 NOTE — TELEPHONE ENCOUNTER
Action Requested: Summary for Provider     []  Critical Lab, Recommendations Needed  [] Need Additional Advice  [x]   FYI    []   Need Orders  [] Need Medications Sent to Pharmacy  []  Other     SUMMARY: Appt scheduled with [facility] today at 645pm. Pt dameon

## 2019-10-01 NOTE — PROGRESS NOTES
HPI:    Patient ID: Bradley De La Fuente is a 62year old female. Pt has had UTI symptoms for the past 2 days. Burning with urination with frequency and urgency. No fevers or flank pain/ back pains. No GI symptoms. No hematuria. No allergies to medications.  P time.   Skin: Skin is warm and dry. Psychiatric: She has a normal mood and affect. Her behavior is normal. Judgment and thought content normal.              ASSESSMENT/PLAN:   1.  Burning with urination  -Urinalysis suggestive of UTI  -After discussion wi

## 2019-10-01 NOTE — PATIENT INSTRUCTIONS
Keflex  Take 1 pill in the morning and 1 pill in the evening for 7 days   Drink plenty of water   Cranberry juice  Azo for pain relief

## 2019-10-16 ENCOUNTER — TELEPHONE (OUTPATIENT)
Dept: FAMILY MEDICINE CLINIC | Facility: CLINIC | Age: 54
End: 2019-10-16

## 2019-10-16 ENCOUNTER — OFFICE VISIT (OUTPATIENT)
Dept: FAMILY MEDICINE CLINIC | Facility: CLINIC | Age: 54
End: 2019-10-16
Payer: COMMERCIAL

## 2019-10-16 VITALS
HEIGHT: 66 IN | WEIGHT: 159 LBS | SYSTOLIC BLOOD PRESSURE: 116 MMHG | TEMPERATURE: 98 F | BODY MASS INDEX: 25.55 KG/M2 | DIASTOLIC BLOOD PRESSURE: 71 MMHG | HEART RATE: 87 BPM | RESPIRATION RATE: 18 BRPM

## 2019-10-16 DIAGNOSIS — J06.9 ACUTE URI: ICD-10-CM

## 2019-10-16 PROCEDURE — 99213 OFFICE O/P EST LOW 20 MIN: CPT | Performed by: FAMILY MEDICINE

## 2019-10-16 RX ORDER — AMOXICILLIN 875 MG/1
875 TABLET, COATED ORAL 2 TIMES DAILY
Qty: 20 TABLET | Refills: 0 | Status: SHIPPED | OUTPATIENT
Start: 2019-10-16 | End: 2020-06-18 | Stop reason: ALTCHOICE

## 2019-10-16 RX ORDER — ALBUTEROL SULFATE 90 UG/1
2 AEROSOL, METERED RESPIRATORY (INHALATION) EVERY 4 HOURS PRN
Qty: 1 INHALER | Refills: 0 | Status: SHIPPED | OUTPATIENT
Start: 2019-10-16 | End: 2020-06-23

## 2019-10-16 NOTE — PROGRESS NOTES
HPI:    Patient ID: Alberto Neumann is a 62year old female. Pt presents with cold symptoms for 4-5 days. Pt has had cough, sore throat. No fevers. Pt has tried otc remedies- advil cold without relief. Pt states no sick contacts.    Pt states some PND and

## 2019-10-16 NOTE — TELEPHONE ENCOUNTER
Spoke with patient and she would like to reschedule her appointment to the earlier time with Dr. Álvaro Martell. Appointment scheduled for 10/16/19 at 11:50 am with PCP previous appt cancelled.

## 2020-02-17 ENCOUNTER — TELEPHONE (OUTPATIENT)
Dept: GASTROENTEROLOGY | Facility: CLINIC | Age: 55
End: 2020-02-17

## 2020-02-17 NOTE — TELEPHONE ENCOUNTER
----- Message from Rani Meade RN sent at 12/23/2015  9:51 AM CST -----  Regarding: CLN recall  5 year CLN recall, per Dr. Haritha Upton; CLN done 3/15/15.

## 2020-02-21 ENCOUNTER — OFFICE VISIT (OUTPATIENT)
Dept: FAMILY MEDICINE CLINIC | Facility: CLINIC | Age: 55
End: 2020-02-21
Payer: COMMERCIAL

## 2020-02-21 ENCOUNTER — NURSE TRIAGE (OUTPATIENT)
Dept: FAMILY MEDICINE CLINIC | Facility: CLINIC | Age: 55
End: 2020-02-21

## 2020-02-21 VITALS
HEART RATE: 86 BPM | SYSTOLIC BLOOD PRESSURE: 125 MMHG | TEMPERATURE: 98 F | OXYGEN SATURATION: 97 % | DIASTOLIC BLOOD PRESSURE: 86 MMHG | HEIGHT: 66 IN | BODY MASS INDEX: 25.94 KG/M2 | WEIGHT: 161.38 LBS

## 2020-02-21 DIAGNOSIS — J02.9 SORE THROAT: Primary | ICD-10-CM

## 2020-02-21 PROCEDURE — 99213 OFFICE O/P EST LOW 20 MIN: CPT | Performed by: PHYSICIAN ASSISTANT

## 2020-02-21 RX ORDER — AZITHROMYCIN 250 MG/1
TABLET, FILM COATED ORAL
Qty: 6 TABLET | Refills: 0 | Status: SHIPPED | OUTPATIENT
Start: 2020-02-21 | End: 2020-06-18 | Stop reason: ALTCHOICE

## 2020-02-21 NOTE — PROGRESS NOTES
HPI:    Patient ID: Fletcher Upton is a 62year old female. Pt presents with cold symptoms for the past 4 days. Pt has had cough, sore throat. Has congestion. Has headaches. Slight fever just one day. No ear symptoms noted.  Pt has tried otc remedies wit normal. Tympanic membrane is not erythematous. No cerumen present  Left Ear: Tympanic membrane, external ear and ear canal normal. Tympanic membrane is not erythematous. No cerumen present  Mouth/Throat: Posterior oropharyngeal erythema present.  No orophar

## 2020-02-21 NOTE — TELEPHONE ENCOUNTER
Action Requested: Summary for Provider     []  Critical Lab, Recommendations Needed  [x] Need Additional Advice  []   FYI    []   Need Orders  [x] Need Medications Sent to Pharmacy  []  Other     SUMMARY: Patient requesting medication for cough, sore throa

## 2020-02-21 NOTE — TELEPHONE ENCOUNTER
Patient called, advised of message and office visit scheduled with Kareen Birmingham at [place] [address] site for 1:30 PM

## 2020-02-21 NOTE — PATIENT INSTRUCTIONS
Z-pack  Take 2 tablets together today, then 1 tablet for the next 4 days. You take it for 5 days, but it lasts in your system for 10 days. Salt gargles--> Take warm water and put as much salt as you can tolerate.  Gargle about 3-4 times per day for 30

## 2020-04-09 ENCOUNTER — NURSE TRIAGE (OUTPATIENT)
Dept: FAMILY MEDICINE CLINIC | Facility: CLINIC | Age: 55
End: 2020-04-09

## 2020-04-09 RX ORDER — METRONIDAZOLE 500 MG/1
500 TABLET ORAL 2 TIMES DAILY
Qty: 14 TABLET | Refills: 0 | Status: SHIPPED | OUTPATIENT
Start: 2020-04-09 | End: 2020-06-18 | Stop reason: ALTCHOICE

## 2020-04-09 NOTE — TELEPHONE ENCOUNTER
Pt contacted and having vaginal discharge consistent with bacterial vaginosis. BV:  - After discussion with patient, flagyl as directed; To call if worse or not better; T call and follow up in one week if not resolved or as needed if worse.

## 2020-04-09 NOTE — TELEPHONE ENCOUNTER
Action Requested: Summary for Provider     []  Critical Lab, Recommendations Needed  [] Need Additional Advice  []   FYI    []   Need Orders  [x] Need Medications Sent to Pharmacy  []  Other     SUMMARY: Patient reports having a vaginal discharge for a cou

## 2020-04-09 NOTE — TELEPHONE ENCOUNTER
Spoke with the patient who reports she just missed a call from the clinic. Patient was advised to be by her phone. Message routed to provider for review.

## 2020-05-06 ENCOUNTER — TELEPHONE (OUTPATIENT)
Dept: FAMILY MEDICINE CLINIC | Facility: CLINIC | Age: 55
End: 2020-05-06

## 2020-05-06 RX ORDER — METRONIDAZOLE 500 MG/1
500 TABLET ORAL 2 TIMES DAILY
Qty: 14 TABLET | Refills: 0 | Status: SHIPPED | OUTPATIENT
Start: 2020-05-06 | End: 2020-06-18 | Stop reason: ALTCHOICE

## 2020-05-06 NOTE — TELEPHONE ENCOUNTER
Jesse Peterson pt stated that you had prescribed her metRONIDAZOLE (FLAGYL) 500 MG Oral Tab on April 9 and she finished the abx but she kept thinking that she will give it more time but she is still having  the s/sx.  Pt feels the foul smell is back and she still h

## 2020-05-06 NOTE — TELEPHONE ENCOUNTER
Pt contacted and states smelly discharge did not completely go away. Pt states better but did not call as she was expecting to go away. No other symptoms. After discussion, will send another week's worth of flagyl as directed.  To call if any sig symptoms

## 2020-06-01 ENCOUNTER — TELEPHONE (OUTPATIENT)
Dept: FAMILY MEDICINE CLINIC | Facility: CLINIC | Age: 55
End: 2020-06-01

## 2020-06-01 DIAGNOSIS — K57.92 DIVERTICULITIS: ICD-10-CM

## 2020-06-01 PROCEDURE — 99213 OFFICE O/P EST LOW 20 MIN: CPT | Performed by: FAMILY MEDICINE

## 2020-06-01 RX ORDER — METRONIDAZOLE 500 MG/1
500 TABLET ORAL 3 TIMES DAILY
Qty: 21 TABLET | Refills: 0 | Status: SHIPPED | OUTPATIENT
Start: 2020-06-01 | End: 2020-06-18 | Stop reason: ALTCHOICE

## 2020-06-01 RX ORDER — CIPROFLOXACIN 500 MG/1
500 TABLET, FILM COATED ORAL 2 TIMES DAILY
Qty: 14 TABLET | Refills: 0 | Status: SHIPPED | OUTPATIENT
Start: 2020-06-01 | End: 2020-06-18 | Stop reason: ALTCHOICE

## 2020-06-01 NOTE — TELEPHONE ENCOUNTER
Virtual Telephone Check-In    Erin Churchill verbally consents to a Virtual/Telephone Check-In visit on 06/01/20. Patient has been referred to the [facility] website at [email or web address] to review the yearly Consent to Treat document.     Patient Lisa Barrett comfortable over the phone and speaking in full sentences without shortness of breath      ASSESSMENT:  Diverticulitis: no sig pains or fevers. PLAN:  - After discussion with patient, cipro and flagyl as directed; simple diet / liquids discussed;  To mick

## 2020-06-01 NOTE — TELEPHONE ENCOUNTER
Patient calling and reported that she have diverticulosis flare up started 4 days ago-abdominal bloating,left side abdomen pain 4/10, nausea and little vomited,no fever, no diarrhea,states that she will start on liquid diet today.   allergy , pharmacy and c

## 2020-06-18 ENCOUNTER — APPOINTMENT (OUTPATIENT)
Dept: CT IMAGING | Facility: HOSPITAL | Age: 55
End: 2020-06-18
Attending: EMERGENCY MEDICINE
Payer: COMMERCIAL

## 2020-06-18 ENCOUNTER — TELEPHONE (OUTPATIENT)
Dept: FAMILY MEDICINE CLINIC | Facility: CLINIC | Age: 55
End: 2020-06-18

## 2020-06-18 ENCOUNTER — HOSPITAL ENCOUNTER (EMERGENCY)
Facility: HOSPITAL | Age: 55
Discharge: HOME | End: 2020-06-18
Attending: EMERGENCY MEDICINE
Payer: COMMERCIAL

## 2020-06-18 VITALS
OXYGEN SATURATION: 96 % | HEART RATE: 75 BPM | RESPIRATION RATE: 16 BRPM | DIASTOLIC BLOOD PRESSURE: 74 MMHG | HEIGHT: 66 IN | SYSTOLIC BLOOD PRESSURE: 111 MMHG | WEIGHT: 150 LBS | TEMPERATURE: 98 F | BODY MASS INDEX: 24.11 KG/M2

## 2020-06-18 DIAGNOSIS — K59.00 CONSTIPATION, UNSPECIFIED CONSTIPATION TYPE: Primary | ICD-10-CM

## 2020-06-18 DIAGNOSIS — R10.9 ABDOMINAL PAIN, ACUTE: ICD-10-CM

## 2020-06-18 PROCEDURE — 85025 COMPLETE CBC W/AUTO DIFF WBC: CPT | Performed by: EMERGENCY MEDICINE

## 2020-06-18 PROCEDURE — 96374 THER/PROPH/DIAG INJ IV PUSH: CPT

## 2020-06-18 PROCEDURE — 96361 HYDRATE IV INFUSION ADD-ON: CPT

## 2020-06-18 PROCEDURE — 80048 BASIC METABOLIC PNL TOTAL CA: CPT | Performed by: EMERGENCY MEDICINE

## 2020-06-18 PROCEDURE — 74177 CT ABD & PELVIS W/CONTRAST: CPT | Performed by: EMERGENCY MEDICINE

## 2020-06-18 PROCEDURE — 81001 URINALYSIS AUTO W/SCOPE: CPT | Performed by: EMERGENCY MEDICINE

## 2020-06-18 PROCEDURE — 99284 EMERGENCY DEPT VISIT MOD MDM: CPT

## 2020-06-18 RX ORDER — CIPROFLOXACIN 500 MG/1
500 TABLET, FILM COATED ORAL 2 TIMES DAILY
Qty: 20 TABLET | Refills: 0 | Status: SHIPPED | OUTPATIENT
Start: 2020-06-18 | End: 2020-06-28

## 2020-06-18 RX ORDER — METRONIDAZOLE 500 MG/1
500 TABLET ORAL 3 TIMES DAILY
Qty: 30 TABLET | Refills: 0 | Status: SHIPPED | OUTPATIENT
Start: 2020-06-18 | End: 2020-06-28

## 2020-06-18 RX ORDER — DOCUSATE SODIUM 100 MG/1
100 CAPSULE, LIQUID FILLED ORAL 2 TIMES DAILY PRN
Qty: 60 CAPSULE | Refills: 0 | Status: SHIPPED | OUTPATIENT
Start: 2020-06-18 | End: 2020-07-18

## 2020-06-18 RX ORDER — ONDANSETRON 2 MG/ML
4 INJECTION INTRAMUSCULAR; INTRAVENOUS ONCE
Status: COMPLETED | OUTPATIENT
Start: 2020-06-18 | End: 2020-06-18

## 2020-06-18 NOTE — ED NOTES
Pt c/o lower abd pain which started today + emesis x2, and states that it feels like her diverticulitis. Pt recently finished antbx for a diverticulitis flare up. Pt c/o tenderness with palpation. Pt denies fevers, diarrhea, constipation, current nausea.  Tyrell Lopez

## 2020-06-18 NOTE — TELEPHONE ENCOUNTER
On call note: Was called on 6/18/20 by patient as she has had severe abdominal pain and vomiting. Has hx of diveriticuliitis. After discussion, to go to ER for further evaluation/ treatment.    Patient verbalized understanding of recommendations and agree

## 2020-06-18 NOTE — ED INITIAL ASSESSMENT (HPI)
Patient with hx Diverticulitis with recent abx treatment at end of May comes in for LLQ abd pain and bloating after eating today. Denies f/d. 2 episodes emesis pta.

## 2020-06-19 NOTE — ED NOTES
Pt provided with discharge instructions and prescriptions. Verbalized understanding for plan of care at home and follow up. All questions/concerns addressed prior to discharge. IV removed, catheter intact. Pt ambulatory out of er with steady gait.

## 2020-06-21 NOTE — ED PROVIDER NOTES
Patient Seen in: [facility] AND [facility] Emergency Department      History   Patient presents with:  Abdomen/Flank Pain    Stated Complaint:     HPI    60year old female with h/o recurrent diverticulitis who presents with abd pain starting earlier today.  Dilshad supple. Normal range of motion. No neck rigidity. Cardiovascular:      Rate and Rhythm: Normal rate and regular rhythm. Heart sounds: Normal heart sounds. No murmur. Pulmonary:      Effort: Pulmonary effort is normal. No respiratory distress. Radiology findings:     Ct Abdomen+pelvis(contrast Only)(cpt=74177)    Result Date: 6/18/2020  CONCLUSION:  1. There is moderately extensive uncomplicated diverticulosis of the descending and sigmoid colon, but no CT evidence for acute diverticulitis. times daily for 10 days. , Gabriella, Disp-20 tablet, R-0    metRONIDAZOLE 500 MG Oral Tab  Take 1 tablet (500 mg total) by mouth 3 (three) times daily for 10 days. , Gabriella, Disp-30 tablet, R-0    docusate sodium 100 MG Oral Cap  Take 1 capsule (100 mg total) by

## 2020-06-22 ENCOUNTER — TELEPHONE (OUTPATIENT)
Dept: FAMILY MEDICINE CLINIC | Facility: CLINIC | Age: 55
End: 2020-06-22

## 2020-06-23 ENCOUNTER — OFFICE VISIT (OUTPATIENT)
Dept: FAMILY MEDICINE CLINIC | Facility: CLINIC | Age: 55
End: 2020-06-23
Payer: COMMERCIAL

## 2020-06-23 VITALS
TEMPERATURE: 97 F | BODY MASS INDEX: 25.23 KG/M2 | DIASTOLIC BLOOD PRESSURE: 78 MMHG | HEIGHT: 66 IN | WEIGHT: 157 LBS | HEART RATE: 86 BPM | SYSTOLIC BLOOD PRESSURE: 109 MMHG

## 2020-06-23 DIAGNOSIS — R23.2 HOT FLASHES: Primary | ICD-10-CM

## 2020-06-23 DIAGNOSIS — K59.00 CONSTIPATION, UNSPECIFIED CONSTIPATION TYPE: ICD-10-CM

## 2020-06-23 PROCEDURE — 99213 OFFICE O/P EST LOW 20 MIN: CPT | Performed by: FAMILY MEDICINE

## 2020-06-23 NOTE — PROGRESS NOTES
HPI:    Patient ID: Sundar Ceja is a 60year old female. Pt presents for follow up from the ER for abdominal pain. Was diagnosed with sig constipation. CT scan showed constipation and diverticulosis.  Pt had bowel movement twice yesterday with more sm %  O2 Device None (Room air)        Current:/74   Pulse 75   Temp 98.2 °F (36.8 °C) (Temporal)   Resp 16   Ht 167.6 cm (5' 6\")   Wt 68 kg   LMP 06/29/2015   SpO2 96%   BMI 24.21 kg/m²            Physical Exam  Vitals signs and nursing note reviewed. following orders were created for panel order CBC WITH DIFFERENTIAL WITH PLATELET.   Procedure                               Abnormality         Status                     ---------                               -----------         ------ diverticulitis, will write abx and pt will start if constipation clears and LLQ pain persists, f/u closely with PMD.          Disposition and Plan     Clinical Impression:  Constipation, unspecified constipation type  (primary encounter diagnosis)  Abdomin rhythm and normal heart sounds. Pulmonary/Chest: Effort normal and breath sounds normal.   Abdominal: Soft. She exhibits no distension. There is no tenderness. Psychiatric: She has a normal mood and affect.  Her behavior is normal.              ASSESSME

## 2020-08-11 ENCOUNTER — OFFICE VISIT (OUTPATIENT)
Dept: OBGYN CLINIC | Facility: CLINIC | Age: 55
End: 2020-08-11
Payer: COMMERCIAL

## 2020-08-11 ENCOUNTER — LAB ENCOUNTER (OUTPATIENT)
Dept: LAB | Facility: REFERENCE LAB | Age: 55
End: 2020-08-11
Attending: OBSTETRICS & GYNECOLOGY
Payer: COMMERCIAL

## 2020-08-11 VITALS
WEIGHT: 157.88 LBS | SYSTOLIC BLOOD PRESSURE: 124 MMHG | HEIGHT: 66 IN | BODY MASS INDEX: 25.37 KG/M2 | DIASTOLIC BLOOD PRESSURE: 80 MMHG

## 2020-08-11 DIAGNOSIS — Z80.3 FAMILY HISTORY OF BREAST CANCER: ICD-10-CM

## 2020-08-11 DIAGNOSIS — Z12.4 SCREENING FOR MALIGNANT NEOPLASM OF THE CERVIX: ICD-10-CM

## 2020-08-11 DIAGNOSIS — Z80.3 FAMILY HISTORY OF BREAST CANCER: Primary | ICD-10-CM

## 2020-08-11 DIAGNOSIS — Z01.419 WOMEN'S ANNUAL ROUTINE GYNECOLOGICAL EXAMINATION: ICD-10-CM

## 2020-08-11 DIAGNOSIS — N95.1 MENOPAUSE SYNDROME: Primary | ICD-10-CM

## 2020-08-11 PROCEDURE — 3079F DIAST BP 80-89 MM HG: CPT | Performed by: OBSTETRICS & GYNECOLOGY

## 2020-08-11 PROCEDURE — 3008F BODY MASS INDEX DOCD: CPT | Performed by: OBSTETRICS & GYNECOLOGY

## 2020-08-11 PROCEDURE — 99386 PREV VISIT NEW AGE 40-64: CPT | Performed by: OBSTETRICS & GYNECOLOGY

## 2020-08-11 PROCEDURE — 87624 HPV HI-RISK TYP POOLED RSLT: CPT | Performed by: OBSTETRICS & GYNECOLOGY

## 2020-08-11 PROCEDURE — 3074F SYST BP LT 130 MM HG: CPT | Performed by: OBSTETRICS & GYNECOLOGY

## 2020-08-11 PROCEDURE — 36415 COLL VENOUS BLD VENIPUNCTURE: CPT

## 2020-08-13 LAB — HPV I/H RISK 1 DNA SPEC QL NAA+PROBE: POSITIVE

## 2020-08-17 ENCOUNTER — TELEPHONE (OUTPATIENT)
Dept: OBGYN CLINIC | Facility: CLINIC | Age: 55
End: 2020-08-17

## 2020-08-17 NOTE — TELEPHONE ENCOUNTER
Patient had called the office and wanted to talk to me did not specify the reason. I returned her call left a voicemail to have her call me back.

## 2020-08-17 NOTE — PROGRESS NOTES
Called pt and has already spoken with Dr. Willow Hunter. Scheduled for colp 09/09/20 at 1730. Pt verbalized understanding.

## 2020-08-17 NOTE — TELEPHONE ENCOUNTER
I returned patient's phone call. Her Pap smear had shown ASCUS with positive HPV and she is scheduled colposcopy possible cervical biopsy for first week of September. She just wanted to make sure waiting till September is okay. I reassured her.

## 2020-08-21 ENCOUNTER — TELEPHONE (OUTPATIENT)
Dept: OBGYN CLINIC | Facility: CLINIC | Age: 55
End: 2020-08-21

## 2020-08-21 NOTE — TELEPHONE ENCOUNTER
Discussed with MINH Simon results. [facility] will await report first in order to verify correct pt before calling pt with results. Per [facility]'s notes from 8/11/2020:     \"2.  Women's annual routine gynecological examination  With patient's family history of breast

## 2020-08-21 NOTE — TELEPHONE ENCOUNTER
Delmer Marie from [employer] came to office stating pt with initials DLB (only initial not full name provided) : [birth year] came back + for BRCA2. [facility] will receive results in the mail. Test results relayed to [place]. [facility] will call pt.

## 2020-08-22 ENCOUNTER — HOSPITAL ENCOUNTER (OUTPATIENT)
Dept: MAMMOGRAPHY | Facility: HOSPITAL | Age: 55
Discharge: HOME | End: 2020-08-22
Attending: OBSTETRICS & GYNECOLOGY
Payer: COMMERCIAL

## 2020-08-22 DIAGNOSIS — Z01.419 WOMEN'S ANNUAL ROUTINE GYNECOLOGICAL EXAMINATION: ICD-10-CM

## 2020-08-22 PROCEDURE — 77063 BREAST TOMOSYNTHESIS BI: CPT | Performed by: OBSTETRICS & GYNECOLOGY

## 2020-08-22 PROCEDURE — 77067 SCR MAMMO BI INCL CAD: CPT | Performed by: OBSTETRICS & GYNECOLOGY

## 2020-08-24 ENCOUNTER — TELEPHONE (OUTPATIENT)
Dept: OBGYN CLINIC | Facility: CLINIC | Age: 55
End: 2020-08-24

## 2020-08-24 DIAGNOSIS — R92.2 BREAST DENSITY: Primary | ICD-10-CM

## 2020-08-24 DIAGNOSIS — R92.2 DENSE BREASTS: ICD-10-CM

## 2020-08-24 PROBLEM — R92.30 DENSE BREASTS: Status: ACTIVE | Noted: 2020-08-24

## 2020-08-25 ENCOUNTER — TELEPHONE (OUTPATIENT)
Dept: OBGYN CLINIC | Facility: CLINIC | Age: 55
End: 2020-08-25

## 2020-08-25 PROBLEM — Z15.09 BRCA2 POSITIVE: Status: ACTIVE | Noted: 2020-08-25

## 2020-08-25 PROBLEM — Z15.01 BRCA2 POSITIVE: Status: ACTIVE | Noted: 2020-08-25

## 2020-08-25 NOTE — TELEPHONE ENCOUNTER
I called Betti Homans to let her know that her blood test for BRCA2 was positive.   With this blood test being positive and with strong family history of breast cancer, I have strongly recommended that she sees Dr. Conner Osborn for consult as well as to kuldip

## 2020-08-26 ENCOUNTER — TELEPHONE (OUTPATIENT)
Dept: OBGYN CLINIC | Facility: CLINIC | Age: 55
End: 2020-08-26

## 2020-08-26 ENCOUNTER — TELEPHONE (OUTPATIENT)
Dept: GENETICS | Facility: HOSPITAL | Age: 55
End: 2020-08-26

## 2020-08-26 ENCOUNTER — HOSPITAL ENCOUNTER (OUTPATIENT)
Dept: ULTRASOUND IMAGING | Facility: HOSPITAL | Age: 55
Discharge: HOME | End: 2020-08-26
Attending: OBSTETRICS & GYNECOLOGY
Payer: COMMERCIAL

## 2020-08-26 ENCOUNTER — HOSPITAL ENCOUNTER (OUTPATIENT)
Dept: MAMMOGRAPHY | Facility: HOSPITAL | Age: 55
Discharge: HOME | End: 2020-08-26
Attending: OBSTETRICS & GYNECOLOGY
Payer: COMMERCIAL

## 2020-08-26 DIAGNOSIS — R92.8 ABNORMAL MAMMOGRAM: ICD-10-CM

## 2020-08-26 PROCEDURE — 77065 DX MAMMO INCL CAD UNI: CPT | Performed by: OBSTETRICS & GYNECOLOGY

## 2020-08-26 PROCEDURE — 76642 ULTRASOUND BREAST LIMITED: CPT | Performed by: OBSTETRICS & GYNECOLOGY

## 2020-08-26 PROCEDURE — 77061 BREAST TOMOSYNTHESIS UNI: CPT | Performed by: OBSTETRICS & GYNECOLOGY

## 2020-08-26 NOTE — TELEPHONE ENCOUNTER
I called Deepa Ugalde regarding her diagnostic mammogram today. Radiologist has recommended that she has diagnostic mammogram performed again in 6 months.     I strongly recommended that she sees Dr. Felecia Montoya, breast surgeon at [facility] for consu

## 2020-08-26 NOTE — TELEPHONE ENCOUNTER
Spoke to Deepa Ugalde as she recently learned she is BRCA2+ and was referred to a surgeon and has concerns and questions about her results.  Clarified that the surgeon Krista Colin) she was referred to runs the [facility] program and will be a useful resource for her breast

## 2020-09-01 ENCOUNTER — NURSE TRIAGE (OUTPATIENT)
Dept: FAMILY MEDICINE CLINIC | Facility: CLINIC | Age: 55
End: 2020-09-01

## 2020-09-01 NOTE — TELEPHONE ENCOUNTER
Action Requested: Summary for Provider     []  Critical Lab, Recommendations Needed  [] Need Additional Advice  [x]   FYI    []   Need Orders  [] Need Medications Sent to Pharmacy  []  Other     SUMMARY: Patient was exposed to COVID-19 on 8/28/20.         See Stahl

## 2020-09-02 ENCOUNTER — APPOINTMENT (OUTPATIENT)
Dept: LAB | Facility: HOSPITAL | Age: 55
End: 2020-09-02
Attending: FAMILY MEDICINE
Payer: COMMERCIAL

## 2020-09-02 ENCOUNTER — TELEMEDICINE (OUTPATIENT)
Dept: FAMILY MEDICINE CLINIC | Facility: CLINIC | Age: 55
End: 2020-09-02
Payer: COMMERCIAL

## 2020-09-02 ENCOUNTER — TELEPHONE (OUTPATIENT)
Dept: FAMILY MEDICINE CLINIC | Facility: CLINIC | Age: 55
End: 2020-09-02

## 2020-09-02 DIAGNOSIS — Z20.822 CLOSE EXPOSURE TO COVID-19 VIRUS: ICD-10-CM

## 2020-09-02 DIAGNOSIS — Z20.822 CLOSE EXPOSURE TO COVID-19 VIRUS: Primary | ICD-10-CM

## 2020-09-02 PROCEDURE — 99213 OFFICE O/P EST LOW 20 MIN: CPT | Performed by: FAMILY MEDICINE

## 2020-09-02 NOTE — TELEPHONE ENCOUNTER
Patient states she was exposed to five coworkers who all tested positive for Covid, and now awaiting even more people to be tested. It has been at least five days since the exposure and is now experiencing fatigue, scratchy throat and sniffling.       Angelica

## 2020-09-02 NOTE — PROGRESS NOTES
HPI:    Patient ID: Marco Mccoy is a 60year old female. This visit is conducted using Telemedicine with live, interactive video and audio during this Coronavirus pandemic.     Please note that the following visit was completed using two-way, real-mario alberto Musculoskeletal: Negative for myalgias. No current outpatient medications on file. Allergies:No Known Allergies   PHYSICAL EXAM:   Physical Exam   Constitutional: She appears well-developed and well-nourished.    Pulmonary/Chest:   Pt is br

## 2020-09-04 LAB — SARS-COV-2 RNA RESP QL NAA+PROBE: NOT DETECTED

## 2020-09-09 ENCOUNTER — OFFICE VISIT (OUTPATIENT)
Dept: OBGYN CLINIC | Facility: CLINIC | Age: 55
End: 2020-09-09
Payer: COMMERCIAL

## 2020-09-09 VITALS — HEIGHT: 66 IN | BODY MASS INDEX: 25 KG/M2 | SYSTOLIC BLOOD PRESSURE: 122 MMHG | DIASTOLIC BLOOD PRESSURE: 80 MMHG

## 2020-09-09 DIAGNOSIS — R87.610 ASCUS WITH POSITIVE HIGH RISK HPV CERVICAL: Primary | ICD-10-CM

## 2020-09-09 DIAGNOSIS — R87.810 ASCUS WITH POSITIVE HIGH RISK HPV CERVICAL: Primary | ICD-10-CM

## 2020-09-09 PROCEDURE — 3079F DIAST BP 80-89 MM HG: CPT | Performed by: OBSTETRICS & GYNECOLOGY

## 2020-09-09 PROCEDURE — 99212 OFFICE O/P EST SF 10 MIN: CPT | Performed by: OBSTETRICS & GYNECOLOGY

## 2020-09-09 PROCEDURE — 3074F SYST BP LT 130 MM HG: CPT | Performed by: OBSTETRICS & GYNECOLOGY

## 2020-09-09 PROCEDURE — 57452 EXAM OF CERVIX W/SCOPE: CPT | Performed by: OBSTETRICS & GYNECOLOGY

## 2020-09-09 NOTE — PROGRESS NOTES
Moni Reilly is here for colposcopy possible cervical biopsy. Her Pap smear had shown ASCUS with positive HPV. Discussed procedure of colposcopy possible cervical biopsy. Informed consent was signed.     [facility]  Obstetrics and [employer]

## 2020-09-15 ENCOUNTER — TELEPHONE (OUTPATIENT)
Dept: FAMILY MEDICINE CLINIC | Facility: CLINIC | Age: 55
End: 2020-09-15

## 2020-09-15 NOTE — TELEPHONE ENCOUNTER
Spoke with the patient who is requesting for her COVID-19 test performed on 9/2/20 to be faxed to her job's HR secure fax number [phone] attention Queenie Knight. Result faxed to the requested number.

## 2020-10-07 ENCOUNTER — OFFICE VISIT (OUTPATIENT)
Dept: SURGERY | Facility: CLINIC | Age: 55
End: 2020-10-07
Payer: COMMERCIAL

## 2020-10-07 VITALS
WEIGHT: 158 LBS | HEIGHT: 66 IN | HEART RATE: 72 BPM | RESPIRATION RATE: 16 BRPM | DIASTOLIC BLOOD PRESSURE: 86 MMHG | SYSTOLIC BLOOD PRESSURE: 124 MMHG | OXYGEN SATURATION: 100 % | BODY MASS INDEX: 25.39 KG/M2

## 2020-10-07 DIAGNOSIS — Z91.89 AT HIGH RISK FOR BREAST CANCER: ICD-10-CM

## 2020-10-07 DIAGNOSIS — Z15.01 BRCA2 GENE MUTATION POSITIVE: Primary | ICD-10-CM

## 2020-10-07 DIAGNOSIS — Z15.09 BRCA2 GENE MUTATION POSITIVE: Primary | ICD-10-CM

## 2020-10-07 PROCEDURE — 99244 OFF/OP CNSLTJ NEW/EST MOD 40: CPT | Performed by: SURGERY

## 2020-10-07 PROCEDURE — 3008F BODY MASS INDEX DOCD: CPT | Performed by: SURGERY

## 2020-10-07 PROCEDURE — 3079F DIAST BP 80-89 MM HG: CPT | Performed by: SURGERY

## 2020-10-07 PROCEDURE — 3074F SYST BP LT 130 MM HG: CPT | Performed by: SURGERY

## 2020-10-08 NOTE — PROGRESS NOTES
[facility]    History of Present Illness:   This is the first visit for this 60year old woman, referred by Dr. Rodrigo Rodarte, who presents for breast cancer risk evaluation related to her family history, which is deta treatment to achieve pregnancy.     Medications:    No outpatient medications have been marked as taking for the 10/7/20 encounter (Office Visit) with Lashay Monet MD.      Allergies:    No Known Allergies    Family History:  Family History   Problem urination, needing to get up at night to urinate, urinary hesitancy or retaining urine, painful urination, urinary incontinence, decreased urine stream, blood in the urine or vaginal/penile discharge.     Skin:    The patient denies rash, itching, skin lesi is regular. There are no murmurs, rubs, gallops or thrills. Breasts:  Her breasts are symmetrical with a cup size A. Right breast[de-identified] The skin, nipple ,and areola appear normal. There is no skin dimpling with movement of the pectoralis.  There is no nipp breast cancer.  It was explained that these are generalized risks for BRCA2 carriers but that some BRCA2 mutations may be associated with a higher or lower risk for these cancers than others and/or cancer risks can be modified by other genetic variants pres patient is interested in considering a bilateral risk reducing mastectomy. I have recommended an MRI now to establish baseline in light of her extremely dense breast tissue and to rule out any occult disease prior to any surgical intervention.   She has be

## 2020-10-09 ENCOUNTER — TELEPHONE (OUTPATIENT)
Dept: OBGYN CLINIC | Facility: CLINIC | Age: 55
End: 2020-10-09

## 2020-10-09 ENCOUNTER — APPOINTMENT (OUTPATIENT)
Dept: HEMATOLOGY/ONCOLOGY | Facility: HOSPITAL | Age: 55
End: 2020-10-09
Payer: COMMERCIAL

## 2020-10-09 ENCOUNTER — GENETICS ENCOUNTER (OUTPATIENT)
Dept: GENETICS | Facility: HOSPITAL | Age: 55
End: 2020-10-09
Payer: COMMERCIAL

## 2020-10-09 DIAGNOSIS — Z15.09 BRCA2 POSITIVE: ICD-10-CM

## 2020-10-09 DIAGNOSIS — Z15.01 BRCA2 POSITIVE: ICD-10-CM

## 2020-10-09 PROCEDURE — 96040 HC GENETIC COUNSELING EA 30 MIN: CPT

## 2020-10-09 NOTE — PROGRESS NOTES
Reason for visit: Ms. Deisi Rivera was seen for the purposes of genetic counseling due recent genetic testing showing a BRCA2 pathogenic variant, c.[id number] (p.[id number]), and a family history of breast cancer.  She was accompanied to the appointment by her Edwardo Hickman has two healthy sons (28, 28) and two healthy daughters (40, 32). She has one full sister (64) who was diagnosed with breast cancer at 40, she has never had genetic testing.  She has 5 paternal half-brothers and a paternal half-sister, no medical more often, and are based on the most up-to-date information available.        Currently recommended medical management for male BRCA1/2 carries according to the NCCN (v1.2020) is as follows:  · Breast self-exam training and education starting at 28 y  · Cl issues. · Although of uncertain benefit, for those patients who have not elected risk-reducing salpingo-oophorectomy, consider concurrent transvaginal ultrasound with serum CA-125, every 6 mo starting at age 32-31 at the clinician's discretion.     · Consi 59%.    Implications for family members  It is advantageous to know if a BRCA2 pathogenic variant is present as medical management recommendations can be implemented. At-risk relatives can be identified, allowing pursuit of a diagnostic evaluation.  In kat

## 2020-10-09 NOTE — TELEPHONE ENCOUNTER
Patient states needs her Genetic Test results sent over to [facility]     Fax# [phone]

## 2020-10-09 NOTE — TELEPHONE ENCOUNTER
Notified pt that genetic screen results faxed as requested. Pt has appmnt scheduled with genetic counselor today. Pt verbalized understanding and agrees with POC.

## 2020-10-13 ENCOUNTER — HOSPITAL ENCOUNTER (OUTPATIENT)
Dept: MRI IMAGING | Facility: HOSPITAL | Age: 55
Discharge: HOME | End: 2020-10-13
Attending: SURGERY
Payer: COMMERCIAL

## 2020-10-13 DIAGNOSIS — Z15.01 BRCA2 GENE MUTATION POSITIVE: ICD-10-CM

## 2020-10-13 DIAGNOSIS — Z91.89 AT HIGH RISK FOR BREAST CANCER: ICD-10-CM

## 2020-10-13 DIAGNOSIS — Z15.09 BRCA2 GENE MUTATION POSITIVE: ICD-10-CM

## 2020-10-13 PROCEDURE — 77049 MRI BREAST C-+ W/CAD BI: CPT | Performed by: SURGERY

## 2020-10-13 PROCEDURE — A9575 INJ GADOTERATE MEGLUMI 0.1ML: HCPCS | Performed by: SURGERY

## 2020-10-14 DIAGNOSIS — R92.8 ABNORMAL MRI, BREAST: Primary | ICD-10-CM

## 2020-10-15 ENCOUNTER — TELEPHONE (OUTPATIENT)
Dept: SURGERY | Facility: CLINIC | Age: 55
End: 2020-10-15

## 2020-10-15 NOTE — TELEPHONE ENCOUNTER
Attempted to call pt back. No answer. LVM after verifying verbal release. Informed pt that I was returning her call. Provided office number to call back or with additional questions and concerns.

## 2020-10-19 ENCOUNTER — TELEPHONE (OUTPATIENT)
Dept: SURGERY | Facility: CLINIC | Age: 55
End: 2020-10-19

## 2020-10-19 NOTE — TELEPHONE ENCOUNTER
Attempted to call pt back regarding her upcoming biopsy. No answer. LVM after verifying verbal release. Informed pt that I was returning her call. Provided office number to call back or with additional questions and concerns.

## 2020-10-21 ENCOUNTER — HOSPITAL ENCOUNTER (OUTPATIENT)
Dept: MAMMOGRAPHY | Facility: HOSPITAL | Age: 55
Discharge: HOME | End: 2020-10-21
Attending: SURGERY
Payer: COMMERCIAL

## 2020-10-21 ENCOUNTER — HOSPITAL ENCOUNTER (OUTPATIENT)
Dept: MRI IMAGING | Facility: HOSPITAL | Age: 55
Discharge: HOME | End: 2020-10-21
Attending: SURGERY
Payer: COMMERCIAL

## 2020-10-21 VITALS
DIASTOLIC BLOOD PRESSURE: 84 MMHG | HEART RATE: 97 BPM | RESPIRATION RATE: 16 BRPM | HEIGHT: 66 IN | WEIGHT: 150 LBS | SYSTOLIC BLOOD PRESSURE: 122 MMHG | BODY MASS INDEX: 24.11 KG/M2

## 2020-10-21 DIAGNOSIS — N63.20 BREAST MASS, LEFT: ICD-10-CM

## 2020-10-21 DIAGNOSIS — R92.8 ABNORMAL MRI, BREAST: ICD-10-CM

## 2020-10-21 PROCEDURE — A9575 INJ GADOTERATE MEGLUMI 0.1ML: HCPCS | Performed by: SURGERY

## 2020-10-21 PROCEDURE — 88305 TISSUE EXAM BY PATHOLOGIST: CPT | Performed by: SURGERY

## 2020-10-21 PROCEDURE — 19085 BX BREAST 1ST LESION MR IMAG: CPT | Performed by: SURGERY

## 2020-10-21 PROCEDURE — 88341 IMHCHEM/IMCYTCHM EA ADD ANTB: CPT | Performed by: SURGERY

## 2020-10-21 PROCEDURE — 88360 TUMOR IMMUNOHISTOCHEM/MANUAL: CPT | Performed by: SURGERY

## 2020-10-21 PROCEDURE — 88342 IMHCHEM/IMCYTCHM 1ST ANTB: CPT | Performed by: SURGERY

## 2020-10-21 PROCEDURE — 77065 DX MAMMO INCL CAD UNI: CPT | Performed by: SURGERY

## 2020-10-21 RX ORDER — LIDOCAINE HYDROCHLORIDE AND EPINEPHRINE 10; 10 MG/ML; UG/ML
INJECTION, SOLUTION INFILTRATION; PERINEURAL
Status: DISCONTINUED
Start: 2020-10-21 | End: 2020-10-21

## 2020-10-21 RX ORDER — LIDOCAINE HYDROCHLORIDE 10 MG/ML
INJECTION, SOLUTION EPIDURAL; INFILTRATION; INTRACAUDAL; PERINEURAL
Status: DISCONTINUED
Start: 2020-10-21 | End: 2020-10-21

## 2020-10-21 NOTE — IMAGING NOTE
1110 To Radiology holding area hx as follow: 4 RELATIVES WITH BREAST YES  MOM DX AGE 76 NOW 80 SISTER DX AGE 40 NOW 61 MATERNAL GRANDPA  BREAST DX AGE 39'S MATERNAL AUNT BREAST DX 74'S BREAST CANCER MATERNAL COUSIN HAD LUNG CANCER.  Patient positive from [facility]

## 2020-10-21 NOTE — PROCEDURES
[facility]  Procedure Note    [facility] Patient Status:  Outpatient    3/23/1965 MRN [id number]   Location [facility] MRI Attending Meliza Neal MD   Hosp Day # 0 PCP Brie Song MD     Procedure: MRI guided biopsy o

## 2020-10-23 ENCOUNTER — TELEPHONE (OUTPATIENT)
Dept: HEMATOLOGY/ONCOLOGY | Facility: HOSPITAL | Age: 55
End: 2020-10-23

## 2020-10-23 ENCOUNTER — TELEPHONE (OUTPATIENT)
Dept: SURGERY | Facility: CLINIC | Age: 55
End: 2020-10-23

## 2020-10-23 ENCOUNTER — OFFICE VISIT (OUTPATIENT)
Dept: SURGERY | Facility: CLINIC | Age: 55
End: 2020-10-23
Payer: COMMERCIAL

## 2020-10-23 VITALS
OXYGEN SATURATION: 99 % | BODY MASS INDEX: 25.84 KG/M2 | DIASTOLIC BLOOD PRESSURE: 81 MMHG | HEART RATE: 102 BPM | WEIGHT: 157 LBS | HEIGHT: 65.2 IN | SYSTOLIC BLOOD PRESSURE: 132 MMHG | RESPIRATION RATE: 16 BRPM

## 2020-10-23 DIAGNOSIS — Z15.09 BRCA2 POSITIVE: Primary | ICD-10-CM

## 2020-10-23 DIAGNOSIS — Z15.01 BRCA2 POSITIVE: Primary | ICD-10-CM

## 2020-10-23 DIAGNOSIS — D05.10 DCIS (DUCTAL CARCINOMA IN SITU): Primary | ICD-10-CM

## 2020-10-23 PROCEDURE — 3008F BODY MASS INDEX DOCD: CPT | Performed by: SURGERY

## 2020-10-23 PROCEDURE — 3075F SYST BP GE 130 - 139MM HG: CPT | Performed by: SURGERY

## 2020-10-23 PROCEDURE — 99243 OFF/OP CNSLTJ NEW/EST LOW 30: CPT | Performed by: SURGERY

## 2020-10-23 PROCEDURE — 3079F DIAST BP 80-89 MM HG: CPT | Performed by: SURGERY

## 2020-10-23 RX ORDER — RIBOFLAVIN (VITAMIN B2) 100 MG
100 TABLET ORAL DAILY
Status: ON HOLD | COMMUNITY
End: 2020-11-16

## 2020-10-23 NOTE — TELEPHONE ENCOUNTER
Phoned patient and introduced myself as Breast RN Navigator. Patient is s/p left breast MRI biopsy, performed 10/21/20. Patient shares she has questions after reading recent [employer] message from Dr. Kirk Brooks regarding her pathology results.     Answered pat

## 2020-10-23 NOTE — PATIENT INSTRUCTIONS
Surgeon: Dr. Judit Mae      Tel:  [phone]    Fax: [phone]     Surgery/Procedure:  Immediate breast reconstruction with bilateral tissue expander placement and acellular dermal matrix. 2.5 hours, general anesthesia, observation.      Hospital:

## 2020-10-23 NOTE — TELEPHONE ENCOUNTER
Left voicemail for patient regarding her pathology results. Patient found to have DCIS on MRI guided biopsy. She is BRCA positive and intending to pursue bilateral mastectomies.   This will only change the need for sentinel lymph node biopsy at the time o

## 2020-10-23 NOTE — PATIENT INSTRUCTIONS
Dr. Flaquito Winter MD    [facility]  Tel:  [phone]      [address], [place], [place]  Fax: [phone]    _____________________________________________________________________      Surgery/Procedure: Bilateral Sk herbal supplements, diet medications, vitamin E, fish oil and green tea supplements. Please check other supplements for these ingredients.  *TYLENOL or acetaminophen is acceptable*  11. If you take Coumadin, Plavix, Xarelto, or Eliquis, please contact your

## 2020-10-23 NOTE — CONSULTS
New Patient Consultation    This is the first visit for this 60year old female who presents to discuss reconstructive options following mastectomy. History of Present Illness:    The patient is a 60year old female who presents with a left DCIS found by healing, weight loss/gain, fertility or hormone problems, cold intolerance, heat intolerance, excessive thirst, or thyroid disease. Allergic/Immunologic:  There is no history of hives, hay fever, angioedema or anaphylaxis.     HEENT:  The patient denies fainting/black outs, dizziness, memory problems, loss of sensation/numbness, problems walking, weakness, tingling or burning in hands/feet.      Psychiatric:  There is no history of abusive relationship, bipolar disorder, sleep disturbance, anxiety, depress appearing rashes or lesions. Extremities: The extremities are without deformity, cyanosis or edema. Impression:   The patient is a 60year old female who presents with a left DCIS is awaiting bilateral nipple-sparing mastectomy.      Discussion and Pl expresses understanding and wishes to proceed with immediate tissue expander reconstruction. A total of 40 minutes was spent with the patient of which 35 minutes was spent counseling the patient regarding reconstructive options.

## 2020-10-23 NOTE — TELEPHONE ENCOUNTER
Left message for patient regarding her pathology results. Emphasized timeliness of surgical timing and encouraged her to contact me with questions.

## 2020-10-27 ENCOUNTER — TELEPHONE (OUTPATIENT)
Dept: SURGERY | Facility: CLINIC | Age: 55
End: 2020-10-27

## 2020-10-27 DIAGNOSIS — Z15.01 BRCA2 GENE MUTATION POSITIVE: ICD-10-CM

## 2020-10-27 DIAGNOSIS — Z15.09 BRCA2 GENE MUTATION POSITIVE: ICD-10-CM

## 2020-10-27 DIAGNOSIS — D05.12 DUCTAL CARCINOMA IN SITU (DCIS) OF LEFT BREAST: Primary | ICD-10-CM

## 2020-10-27 NOTE — TELEPHONE ENCOUNTER
I called the patient and scheduled her procedure with Dr Hiro Chris and Dr Mary Kay Wren on 11/16/2020 at [place]. Confirmed date and location

## 2020-11-03 ENCOUNTER — OFFICE VISIT (OUTPATIENT)
Dept: PHYSICAL THERAPY | Facility: HOSPITAL | Age: 55
End: 2020-11-03
Attending: SURGERY
Payer: COMMERCIAL

## 2020-11-03 ENCOUNTER — TELEPHONE (OUTPATIENT)
Dept: SURGERY | Facility: CLINIC | Age: 55
End: 2020-11-03

## 2020-11-04 ENCOUNTER — LAB ENCOUNTER (OUTPATIENT)
Dept: LAB | Age: 55
End: 2020-11-04
Attending: FAMILY MEDICINE
Payer: COMMERCIAL

## 2020-11-04 ENCOUNTER — SOCIAL WORK SERVICES (OUTPATIENT)
Dept: HEMATOLOGY/ONCOLOGY | Facility: HOSPITAL | Age: 55
End: 2020-11-04

## 2020-11-04 ENCOUNTER — OFFICE VISIT (OUTPATIENT)
Dept: FAMILY MEDICINE CLINIC | Facility: CLINIC | Age: 55
End: 2020-11-04
Payer: COMMERCIAL

## 2020-11-04 VITALS
RESPIRATION RATE: 18 BRPM | TEMPERATURE: 97 F | HEART RATE: 98 BPM | BODY MASS INDEX: 26.82 KG/M2 | SYSTOLIC BLOOD PRESSURE: 141 MMHG | WEIGHT: 161 LBS | HEIGHT: 65 IN | DIASTOLIC BLOOD PRESSURE: 73 MMHG

## 2020-11-04 DIAGNOSIS — Z01.818 PREOP EXAMINATION: Primary | ICD-10-CM

## 2020-11-04 DIAGNOSIS — Z01.818 PREOPERATIVE GENERAL PHYSICAL EXAMINATION: ICD-10-CM

## 2020-11-04 DIAGNOSIS — C50.912 BILATERAL MALIGNANT NEOPLASM OF BREAST IN FEMALE, UNSPECIFIED ESTROGEN RECEPTOR STATUS, UNSPECIFIED SITE OF BREAST (HCC): Primary | ICD-10-CM

## 2020-11-04 DIAGNOSIS — C50.911 BILATERAL MALIGNANT NEOPLASM OF BREAST IN FEMALE, UNSPECIFIED ESTROGEN RECEPTOR STATUS, UNSPECIFIED SITE OF BREAST (HCC): Primary | ICD-10-CM

## 2020-11-04 PROCEDURE — 80053 COMPREHEN METABOLIC PANEL: CPT

## 2020-11-04 PROCEDURE — 36415 COLL VENOUS BLD VENIPUNCTURE: CPT

## 2020-11-04 PROCEDURE — 93010 ELECTROCARDIOGRAM REPORT: CPT | Performed by: FAMILY MEDICINE

## 2020-11-04 PROCEDURE — 99072 ADDL SUPL MATRL&STAF TM PHE: CPT | Performed by: FAMILY MEDICINE

## 2020-11-04 PROCEDURE — 3078F DIAST BP <80 MM HG: CPT | Performed by: FAMILY MEDICINE

## 2020-11-04 PROCEDURE — 3077F SYST BP >= 140 MM HG: CPT | Performed by: FAMILY MEDICINE

## 2020-11-04 PROCEDURE — 3008F BODY MASS INDEX DOCD: CPT | Performed by: FAMILY MEDICINE

## 2020-11-04 PROCEDURE — 85027 COMPLETE CBC AUTOMATED: CPT

## 2020-11-04 PROCEDURE — 99214 OFFICE O/P EST MOD 30 MIN: CPT | Performed by: FAMILY MEDICINE

## 2020-11-04 PROCEDURE — 93005 ELECTROCARDIOGRAM TRACING: CPT

## 2020-11-04 NOTE — PROGRESS NOTES
[place] Paperwork received for the pt's request of time off for procedure 11/16/2020. Plan for 4-6 weeks of time off, would be at 12/28/2020. Paperwork drafted (4 pages), signed by MD. There was an additional form for Return to Work status.  PETER sent this back

## 2020-11-04 NOTE — PROGRESS NOTES
HPI:    Patient ID: Pat Player is a 60year old female. Patient is here for for preoperative history and physical. The patient will be having surgery with Kellie on 11/16/20 at [place]. No acute issues or problems.  No sig chronic medic She has no wheezes. She has no rales. Abdominal: Soft. Bowel sounds are normal. She exhibits no distension. There is no abdominal tenderness. Musculoskeletal: Normal range of motion. Lymphadenopathy:     She has no cervical adenopathy.    Neurological

## 2020-11-04 NOTE — PATIENT INSTRUCTIONS
ARM EXERCISES AFTER BREAST SURGERY:  KEY NOTES:    1. Start these exercises 1 week after surgery  2.  DO NOT LIFT YOUR ARM (ELBOW) HIGHER THAN SHOULDER WHEN DRAINS ARE IN  When you begin the exercise program, you will find that you may tire easily and ther If you cannot perform your full range of motion approximately 8 weeks after your surgery, ask your surgeon if a referral to Physical Therapy is appropriate. It is important to your recovery to regain your range of motion early in your recovery.    Immediate WAND EXERCISES:  Start 1 week after surgery. Limit movement to shoulder height if drains are in. To your tolerance when drains are out. Do not push through pain, only slight discomfort. AROM Exercises:  Can start 1 week after surgery.   Wand exerc

## 2020-11-04 NOTE — PROGRESS NOTES
BREAST CANCER SURGICAL SCREENINGS  [facility]      PATIENT SUMMARY:     Involved Side:    LEFT                                                 Dominant Hand:   RIGHT                               Occupation:      Teacher, cheer  strength  Right Left    flex 5 5   flex     flex      abd 5 5   abd     abd      ext 5 5   ext     ext      ER 5 5   ER     ER      IR 5 5   IR     IR                     Posture: Good    Posture:     Posture:                      Cording (grade 0-3):

## 2020-11-05 ENCOUNTER — TELEPHONE (OUTPATIENT)
Dept: HEMATOLOGY/ONCOLOGY | Facility: HOSPITAL | Age: 55
End: 2020-11-05

## 2020-11-05 NOTE — TELEPHONE ENCOUNTER
Call received from patient with questions regarding her surgery and what to expect. Answered questions concerning current Care Partner Policy.   Patient aware of current policy that allows one Care Partner for the day of surgery, and One Care Partner dur

## 2020-11-06 DIAGNOSIS — Z15.01 BRCA2 GENE MUTATION POSITIVE: ICD-10-CM

## 2020-11-06 DIAGNOSIS — D05.12 DUCTAL CARCINOMA IN SITU (DCIS) OF LEFT BREAST: Primary | ICD-10-CM

## 2020-11-06 DIAGNOSIS — Z15.09 BRCA2 GENE MUTATION POSITIVE: ICD-10-CM

## 2020-11-13 ENCOUNTER — APPOINTMENT (OUTPATIENT)
Dept: LAB | Age: 55
End: 2020-11-13
Attending: SURGERY
Payer: COMMERCIAL

## 2020-11-13 DIAGNOSIS — Z01.818 PREOP TESTING: ICD-10-CM

## 2020-11-16 ENCOUNTER — HOSPITAL ENCOUNTER (OUTPATIENT)
Facility: HOSPITAL | Age: 55
Discharge: HOME HEALTH | End: 2020-11-17
Attending: SURGERY | Admitting: SURGERY
Payer: COMMERCIAL

## 2020-11-16 ENCOUNTER — HOSPITAL ENCOUNTER (OUTPATIENT)
Dept: NUCLEAR MEDICINE | Facility: HOSPITAL | Age: 55
Discharge: HOME | End: 2020-11-16
Attending: SURGERY
Payer: COMMERCIAL

## 2020-11-16 ENCOUNTER — ANESTHESIA EVENT (OUTPATIENT)
Dept: SURGERY | Facility: HOSPITAL | Age: 55
End: 2020-11-16
Payer: COMMERCIAL

## 2020-11-16 ENCOUNTER — ANESTHESIA (OUTPATIENT)
Dept: SURGERY | Facility: HOSPITAL | Age: 55
End: 2020-11-16
Payer: COMMERCIAL

## 2020-11-16 DIAGNOSIS — D05.12 DUCTAL CARCINOMA IN SITU OF LEFT BREAST: ICD-10-CM

## 2020-11-16 DIAGNOSIS — Z15.09 BRCA2 GENE MUTATION POSITIVE: ICD-10-CM

## 2020-11-16 DIAGNOSIS — D05.12 DUCTAL CARCINOMA IN SITU (DCIS) OF LEFT BREAST: ICD-10-CM

## 2020-11-16 DIAGNOSIS — Z01.818 PREOP TESTING: Primary | ICD-10-CM

## 2020-11-16 DIAGNOSIS — Z15.01 BRCA2 GENE MUTATION POSITIVE: ICD-10-CM

## 2020-11-16 PROCEDURE — 0HHV0NZ INSERTION OF TISSUE EXPANDER INTO BILATERAL BREAST, OPEN APPROACH: ICD-10-PCS | Performed by: SURGERY

## 2020-11-16 PROCEDURE — 0HTV0ZZ RESECTION OF BILATERAL BREAST, OPEN APPROACH: ICD-10-PCS | Performed by: SURGERY

## 2020-11-16 PROCEDURE — 07B60ZX EXCISION OF LEFT AXILLARY LYMPHATIC, OPEN APPROACH, DIAGNOSTIC: ICD-10-PCS | Performed by: SURGERY

## 2020-11-16 PROCEDURE — 78195 LYMPH SYSTEM IMAGING: CPT | Performed by: SURGERY

## 2020-11-16 PROCEDURE — 99225 SUBSEQUENT OBSERVATION CARE: CPT | Performed by: HOSPITALIST

## 2020-11-16 DEVICE — MATRIX ALLODERM SELECT MEDIUM: Type: IMPLANTABLE DEVICE | Site: BREAST | Status: FUNCTIONAL

## 2020-11-16 RX ORDER — HYDROMORPHONE HYDROCHLORIDE 1 MG/ML
0.4 INJECTION, SOLUTION INTRAMUSCULAR; INTRAVENOUS; SUBCUTANEOUS EVERY 5 MIN PRN
Status: DISCONTINUED | OUTPATIENT
Start: 2020-11-16 | End: 2020-11-16 | Stop reason: HOSPADM

## 2020-11-16 RX ORDER — HYDROCODONE BITARTRATE AND ACETAMINOPHEN 5; 325 MG/1; MG/1
1-2 TABLET ORAL EVERY 4 HOURS PRN
Qty: 40 TABLET | Refills: 0 | Status: SHIPPED | OUTPATIENT
Start: 2020-11-16 | End: 2020-12-04 | Stop reason: ALTCHOICE

## 2020-11-16 RX ORDER — METOCLOPRAMIDE 10 MG/1
10 TABLET ORAL EVERY 6 HOURS PRN
Status: DISCONTINUED | OUTPATIENT
Start: 2020-11-16 | End: 2020-11-17

## 2020-11-16 RX ORDER — MORPHINE SULFATE 4 MG/ML
4 INJECTION, SOLUTION INTRAMUSCULAR; INTRAVENOUS EVERY 10 MIN PRN
Status: DISCONTINUED | OUTPATIENT
Start: 2020-11-16 | End: 2020-11-16 | Stop reason: HOSPADM

## 2020-11-16 RX ORDER — DIPHENHYDRAMINE HCL 25 MG
25 CAPSULE ORAL EVERY 4 HOURS PRN
Status: DISCONTINUED | OUTPATIENT
Start: 2020-11-16 | End: 2020-11-17

## 2020-11-16 RX ORDER — SODIUM CHLORIDE, SODIUM LACTATE, POTASSIUM CHLORIDE, CALCIUM CHLORIDE 600; 310; 30; 20 MG/100ML; MG/100ML; MG/100ML; MG/100ML
INJECTION, SOLUTION INTRAVENOUS CONTINUOUS
Status: DISCONTINUED | OUTPATIENT
Start: 2020-11-16 | End: 2020-11-16 | Stop reason: HOSPADM

## 2020-11-16 RX ORDER — ACETAMINOPHEN 500 MG
1000 TABLET ORAL EVERY 6 HOURS PRN
Status: DISCONTINUED | OUTPATIENT
Start: 2020-11-16 | End: 2020-11-17

## 2020-11-16 RX ORDER — NALOXONE HYDROCHLORIDE 0.4 MG/ML
80 INJECTION, SOLUTION INTRAMUSCULAR; INTRAVENOUS; SUBCUTANEOUS AS NEEDED
Status: DISCONTINUED | OUTPATIENT
Start: 2020-11-16 | End: 2020-11-16 | Stop reason: HOSPADM

## 2020-11-16 RX ORDER — LIDOCAINE HYDROCHLORIDE 40 MG/ML
SOLUTION TOPICAL AS NEEDED
Status: DISCONTINUED | OUTPATIENT
Start: 2020-11-16 | End: 2020-11-16 | Stop reason: SURG

## 2020-11-16 RX ORDER — ONDANSETRON 2 MG/ML
INJECTION INTRAMUSCULAR; INTRAVENOUS AS NEEDED
Status: DISCONTINUED | OUTPATIENT
Start: 2020-11-16 | End: 2020-11-16 | Stop reason: SURG

## 2020-11-16 RX ORDER — HYDROCODONE BITARTRATE AND ACETAMINOPHEN 5; 325 MG/1; MG/1
1 TABLET ORAL EVERY 4 HOURS PRN
Status: DISCONTINUED | OUTPATIENT
Start: 2020-11-16 | End: 2020-11-17

## 2020-11-16 RX ORDER — CEFAZOLIN SODIUM/WATER 2 G/20 ML
2 SYRINGE (ML) INTRAVENOUS EVERY 8 HOURS
Status: COMPLETED | OUTPATIENT
Start: 2020-11-16 | End: 2020-11-17

## 2020-11-16 RX ORDER — MORPHINE SULFATE 4 MG/ML
8 INJECTION, SOLUTION INTRAMUSCULAR; INTRAVENOUS
Status: DISCONTINUED | OUTPATIENT
Start: 2020-11-16 | End: 2020-11-17

## 2020-11-16 RX ORDER — METHYLENE BLUE 10 MG/ML
INJECTION INTRAVENOUS AS NEEDED
Status: DISCONTINUED | OUTPATIENT
Start: 2020-11-16 | End: 2020-11-16

## 2020-11-16 RX ORDER — SODIUM CHLORIDE, SODIUM LACTATE, POTASSIUM CHLORIDE, CALCIUM CHLORIDE 600; 310; 30; 20 MG/100ML; MG/100ML; MG/100ML; MG/100ML
INJECTION, SOLUTION INTRAVENOUS CONTINUOUS
Status: DISCONTINUED | OUTPATIENT
Start: 2020-11-16 | End: 2020-11-17

## 2020-11-16 RX ORDER — GLYCOPYRROLATE 0.2 MG/ML
INJECTION, SOLUTION INTRAMUSCULAR; INTRAVENOUS AS NEEDED
Status: DISCONTINUED | OUTPATIENT
Start: 2020-11-16 | End: 2020-11-16 | Stop reason: SURG

## 2020-11-16 RX ORDER — PROCHLORPERAZINE EDISYLATE 5 MG/ML
5 INJECTION INTRAMUSCULAR; INTRAVENOUS ONCE AS NEEDED
Status: DISCONTINUED | OUTPATIENT
Start: 2020-11-16 | End: 2020-11-16 | Stop reason: HOSPADM

## 2020-11-16 RX ORDER — DIPHENHYDRAMINE HYDROCHLORIDE 50 MG/ML
12.5 INJECTION INTRAMUSCULAR; INTRAVENOUS EVERY 4 HOURS PRN
Status: DISCONTINUED | OUTPATIENT
Start: 2020-11-16 | End: 2020-11-17

## 2020-11-16 RX ORDER — ONDANSETRON 4 MG/1
4 TABLET, ORALLY DISINTEGRATING ORAL EVERY 6 HOURS PRN
Status: DISCONTINUED | OUTPATIENT
Start: 2020-11-16 | End: 2020-11-17

## 2020-11-16 RX ORDER — HYDROMORPHONE HYDROCHLORIDE 1 MG/ML
0.2 INJECTION, SOLUTION INTRAMUSCULAR; INTRAVENOUS; SUBCUTANEOUS EVERY 5 MIN PRN
Status: DISCONTINUED | OUTPATIENT
Start: 2020-11-16 | End: 2020-11-16 | Stop reason: HOSPADM

## 2020-11-16 RX ORDER — ACETAMINOPHEN 650 MG
TABLET, EXTENDED RELEASE ORAL AS NEEDED
Status: DISCONTINUED | OUTPATIENT
Start: 2020-11-16 | End: 2020-11-16

## 2020-11-16 RX ORDER — METOCLOPRAMIDE HYDROCHLORIDE 5 MG/ML
10 INJECTION INTRAMUSCULAR; INTRAVENOUS EVERY 6 HOURS PRN
Status: DISCONTINUED | OUTPATIENT
Start: 2020-11-16 | End: 2020-11-17

## 2020-11-16 RX ORDER — CEPHALEXIN 500 MG/1
500 CAPSULE ORAL 4 TIMES DAILY
Qty: 40 CAPSULE | Refills: 2 | Status: SHIPPED | OUTPATIENT
Start: 2020-11-16 | End: 2020-11-26

## 2020-11-16 RX ORDER — HYDROCODONE BITARTRATE AND ACETAMINOPHEN 5; 325 MG/1; MG/1
2 TABLET ORAL EVERY 4 HOURS PRN
Status: DISCONTINUED | OUTPATIENT
Start: 2020-11-16 | End: 2020-11-17

## 2020-11-16 RX ORDER — NEOSTIGMINE METHYLSULFATE 1 MG/ML
INJECTION INTRAVENOUS AS NEEDED
Status: DISCONTINUED | OUTPATIENT
Start: 2020-11-16 | End: 2020-11-16 | Stop reason: SURG

## 2020-11-16 RX ORDER — HYDROCODONE BITARTRATE AND ACETAMINOPHEN 5; 325 MG/1; MG/1
1 TABLET ORAL AS NEEDED
Status: DISCONTINUED | OUTPATIENT
Start: 2020-11-16 | End: 2020-11-16 | Stop reason: HOSPADM

## 2020-11-16 RX ORDER — ENOXAPARIN SODIUM 100 MG/ML
40 INJECTION SUBCUTANEOUS DAILY
Status: DISCONTINUED | OUTPATIENT
Start: 2020-11-17 | End: 2020-11-17

## 2020-11-16 RX ORDER — PHENYLEPHRINE HCL 10 MG/ML
VIAL (ML) INJECTION AS NEEDED
Status: DISCONTINUED | OUTPATIENT
Start: 2020-11-16 | End: 2020-11-16 | Stop reason: SURG

## 2020-11-16 RX ORDER — MORPHINE SULFATE 4 MG/ML
4 INJECTION, SOLUTION INTRAMUSCULAR; INTRAVENOUS
Status: DISCONTINUED | OUTPATIENT
Start: 2020-11-16 | End: 2020-11-17

## 2020-11-16 RX ORDER — BUPIVACAINE HYDROCHLORIDE 5 MG/ML
INJECTION, SOLUTION EPIDURAL; INTRACAUDAL AS NEEDED
Status: DISCONTINUED | OUTPATIENT
Start: 2020-11-16 | End: 2020-11-16

## 2020-11-16 RX ORDER — MORPHINE SULFATE 2 MG/ML
2 INJECTION, SOLUTION INTRAMUSCULAR; INTRAVENOUS
Status: DISCONTINUED | OUTPATIENT
Start: 2020-11-16 | End: 2020-11-17

## 2020-11-16 RX ORDER — ONDANSETRON 2 MG/ML
4 INJECTION INTRAMUSCULAR; INTRAVENOUS ONCE AS NEEDED
Status: DISCONTINUED | OUTPATIENT
Start: 2020-11-16 | End: 2020-11-16 | Stop reason: HOSPADM

## 2020-11-16 RX ORDER — DOCUSATE SODIUM 100 MG/1
100 CAPSULE, LIQUID FILLED ORAL 2 TIMES DAILY
Qty: 40 CAPSULE | Refills: 0 | Status: SHIPPED | OUTPATIENT
Start: 2020-11-16 | End: 2020-12-04 | Stop reason: ALTCHOICE

## 2020-11-16 RX ORDER — METOCLOPRAMIDE 10 MG/1
TABLET ORAL AS NEEDED
Status: DISCONTINUED | OUTPATIENT
Start: 2020-11-16 | End: 2020-11-16 | Stop reason: SURG

## 2020-11-16 RX ORDER — ONDANSETRON 4 MG/1
4 TABLET, FILM COATED ORAL EVERY 8 HOURS PRN
Qty: 20 TABLET | Refills: 1 | Status: SHIPPED | OUTPATIENT
Start: 2020-11-16 | End: 2020-12-04 | Stop reason: ALTCHOICE

## 2020-11-16 RX ORDER — CEFAZOLIN SODIUM/WATER 2 G/20 ML
2 SYRINGE (ML) INTRAVENOUS ONCE
Status: COMPLETED | OUTPATIENT
Start: 2020-11-16 | End: 2020-11-16

## 2020-11-16 RX ORDER — MORPHINE SULFATE 10 MG/ML
6 INJECTION, SOLUTION INTRAMUSCULAR; INTRAVENOUS EVERY 10 MIN PRN
Status: DISCONTINUED | OUTPATIENT
Start: 2020-11-16 | End: 2020-11-16 | Stop reason: HOSPADM

## 2020-11-16 RX ORDER — SCOLOPAMINE TRANSDERMAL SYSTEM 1 MG/1
1 PATCH, EXTENDED RELEASE TRANSDERMAL
Status: DISCONTINUED | OUTPATIENT
Start: 2020-11-16 | End: 2020-11-16 | Stop reason: HOSPADM

## 2020-11-16 RX ORDER — ACETAMINOPHEN 500 MG
1000 TABLET ORAL ONCE
Status: COMPLETED | OUTPATIENT
Start: 2020-11-16 | End: 2020-11-16

## 2020-11-16 RX ORDER — HYDROCODONE BITARTRATE AND ACETAMINOPHEN 5; 325 MG/1; MG/1
2 TABLET ORAL AS NEEDED
Status: DISCONTINUED | OUTPATIENT
Start: 2020-11-16 | End: 2020-11-16 | Stop reason: HOSPADM

## 2020-11-16 RX ORDER — HYDROMORPHONE HYDROCHLORIDE 1 MG/ML
0.6 INJECTION, SOLUTION INTRAMUSCULAR; INTRAVENOUS; SUBCUTANEOUS EVERY 5 MIN PRN
Status: DISCONTINUED | OUTPATIENT
Start: 2020-11-16 | End: 2020-11-16 | Stop reason: HOSPADM

## 2020-11-16 RX ORDER — SODIUM CHLORIDE 9 MG/ML
INJECTION INTRAVENOUS AS NEEDED
Status: DISCONTINUED | OUTPATIENT
Start: 2020-11-16 | End: 2020-11-16

## 2020-11-16 RX ORDER — ACETAMINOPHEN 500 MG
500 TABLET ORAL EVERY 6 HOURS PRN
Status: DISCONTINUED | OUTPATIENT
Start: 2020-11-16 | End: 2020-11-17

## 2020-11-16 RX ORDER — DEXAMETHASONE SODIUM PHOSPHATE 4 MG/ML
VIAL (ML) INJECTION AS NEEDED
Status: DISCONTINUED | OUTPATIENT
Start: 2020-11-16 | End: 2020-11-16 | Stop reason: SURG

## 2020-11-16 RX ORDER — ONDANSETRON 2 MG/ML
4 INJECTION INTRAMUSCULAR; INTRAVENOUS EVERY 6 HOURS PRN
Status: DISCONTINUED | OUTPATIENT
Start: 2020-11-16 | End: 2020-11-17

## 2020-11-16 RX ORDER — ROCURONIUM BROMIDE 10 MG/ML
INJECTION, SOLUTION INTRAVENOUS AS NEEDED
Status: DISCONTINUED | OUTPATIENT
Start: 2020-11-16 | End: 2020-11-16 | Stop reason: SURG

## 2020-11-16 RX ORDER — MORPHINE SULFATE 4 MG/ML
2 INJECTION, SOLUTION INTRAMUSCULAR; INTRAVENOUS EVERY 10 MIN PRN
Status: DISCONTINUED | OUTPATIENT
Start: 2020-11-16 | End: 2020-11-16 | Stop reason: HOSPADM

## 2020-11-16 RX ORDER — DOCUSATE SODIUM 100 MG/1
100 CAPSULE, LIQUID FILLED ORAL 2 TIMES DAILY
Status: DISCONTINUED | OUTPATIENT
Start: 2020-11-17 | End: 2020-11-17

## 2020-11-16 RX ORDER — MIDAZOLAM HYDROCHLORIDE 1 MG/ML
INJECTION INTRAMUSCULAR; INTRAVENOUS AS NEEDED
Status: DISCONTINUED | OUTPATIENT
Start: 2020-11-16 | End: 2020-11-16 | Stop reason: SURG

## 2020-11-16 RX ADMIN — CEFAZOLIN SODIUM/WATER 2 G: 2 G/20 ML SYRINGE (ML) INTRAVENOUS at 12:05:00

## 2020-11-16 RX ADMIN — PHENYLEPHRINE HCL 100 MCG: 10 MG/ML VIAL (ML) INJECTION at 13:29:00

## 2020-11-16 RX ADMIN — PHENYLEPHRINE HCL 100 MCG: 10 MG/ML VIAL (ML) INJECTION at 12:54:00

## 2020-11-16 RX ADMIN — ONDANSETRON 4 MG: 2 INJECTION INTRAMUSCULAR; INTRAVENOUS at 15:42:00

## 2020-11-16 RX ADMIN — METOCLOPRAMIDE 10 MG: 10 TABLET ORAL at 12:05:00

## 2020-11-16 RX ADMIN — SCOLOPAMINE TRANSDERMAL SYSTEM 1 PATCH: 1 PATCH, EXTENDED RELEASE TRANSDERMAL at 12:02:00

## 2020-11-16 RX ADMIN — MIDAZOLAM HYDROCHLORIDE 2 MG: 1 INJECTION INTRAMUSCULAR; INTRAVENOUS at 11:50:00

## 2020-11-16 RX ADMIN — LIDOCAINE HYDROCHLORIDE 2 ML: 40 SOLUTION TOPICAL at 12:11:00

## 2020-11-16 RX ADMIN — PHENYLEPHRINE HCL 100 MCG: 10 MG/ML VIAL (ML) INJECTION at 12:42:00

## 2020-11-16 RX ADMIN — DEXAMETHASONE SODIUM PHOSPHATE 4 MG: 4 MG/ML VIAL (ML) INJECTION at 12:04:00

## 2020-11-16 RX ADMIN — GLYCOPYRROLATE 0.2 MG: 0.2 INJECTION, SOLUTION INTRAMUSCULAR; INTRAVENOUS at 12:54:00

## 2020-11-16 RX ADMIN — ROCURONIUM BROMIDE 30 MG: 10 INJECTION, SOLUTION INTRAVENOUS at 12:11:00

## 2020-11-16 RX ADMIN — PHENYLEPHRINE HCL 100 MCG: 10 MG/ML VIAL (ML) INJECTION at 12:38:00

## 2020-11-16 RX ADMIN — PHENYLEPHRINE HCL 100 MCG: 10 MG/ML VIAL (ML) INJECTION at 13:24:00

## 2020-11-16 RX ADMIN — SODIUM CHLORIDE, SODIUM LACTATE, POTASSIUM CHLORIDE, CALCIUM CHLORIDE: 600; 310; 30; 20 INJECTION, SOLUTION INTRAVENOUS at 13:19:00

## 2020-11-16 RX ADMIN — NEOSTIGMINE METHYLSULFATE 3 MG: 1 INJECTION INTRAVENOUS at 15:38:00

## 2020-11-16 RX ADMIN — GLYCOPYRROLATE 0.6 MG: 0.2 INJECTION, SOLUTION INTRAMUSCULAR; INTRAVENOUS at 15:39:00

## 2020-11-16 RX ADMIN — SODIUM CHLORIDE, SODIUM LACTATE, POTASSIUM CHLORIDE, CALCIUM CHLORIDE: 600; 310; 30; 20 INJECTION, SOLUTION INTRAVENOUS at 16:05:00

## 2020-11-16 NOTE — ANESTHESIA POSTPROCEDURE EVALUATION
Patient: Krzysztof Arizmendi    Procedure Summary     Date: 11/16/20 Room / Location: [address] [place] [address] MAIN OR 06 / [address] [place] [address] OR    Anesthesia Start: 1200 Anesthesia Stop:     Procedures:       BREAST SIMPLE MASTECTOMY (Bilateral )      BREAST SENTINEL LYMPH NODE BIOPSY (Lef

## 2020-11-16 NOTE — PROGRESS NOTES
[facility]    Progress Note    Denise Branch Patient Status:  Outpatient in a Bed    3/23/1965 MRN [id number]   Location [place] [address] Attending Ilana Jung MD   Hosp Day # 0 PCP MD [facility] Lymph Node Biopsy, followed by Immediate breast reconstruction with bilateral tissue expander placement and acellular dermal matrix. PAIN CONTROL, MONITOR SURGICAL DRAINS, DVT PROPHYLAXIS. PATHOLOGY PENDING.        BRCA2 positive              Re

## 2020-11-16 NOTE — BRIEF OP NOTE
Pre-Operative Diagnosis: Ductal carcinoma in situ (DCIS) of left breast [D05.12]  BRCA2 gene mutation positive [Z15.01, Z15.09]     Post-Operative Diagnosis: Ductal carcinoma in situ (DCIS) of left breast [D05.12]BRCA2 gene mutation positive [Z15.01, Z15.0

## 2020-11-16 NOTE — ANESTHESIA PREPROCEDURE EVALUATION
Anesthesia PreOp Note    HPI:     Nargis Jimenes is a 60year old female who presents for preoperative consultation requested by: Eriberto Yu MD    Date of Surgery: 11/16/2020    Procedure(s):  BREAST SIMPLE MASTECTOMY  BREAST SENTINEL LYMPH NODE BI file.      No Known Allergies    Family History   Problem Relation Age of Onset   • Breast Cancer Mother 76   • Other (hypothyroidism) Mother    • Breast Cancer Sister 39     Social History    Socioeconomic History      Marital status:       Spouse Transfusions: No        Caffeine Concern: Not Asked        Occupational Exposure: Not Asked        Hobby Hazards: Not Asked        Sleep Concern: Not Asked        Stress Concern: Not Asked        Weight Concern: Not Asked        Special Diet: Not Asked analgesics  Informed Consent Plan and Risks Discussed With:  Patient and child/children      I have informed Erin Kerline and/or legal guardian or family member of the nature of the anesthetic plan, benefits, risks including possible dental damage if rel

## 2020-11-16 NOTE — ANESTHESIA PROCEDURE NOTES
Airway  Date/Time: 11/16/2020 12:10 PM  Urgency: Elective      General Information and Staff    Patient location during procedure: OR  Anesthesiologist: Ahmet Crawford MD  Performed: anesthesiologist     Indications and Patient Condition  Indications fo

## 2020-11-16 NOTE — OPERATIVE REPORT
PREOPERATIVE DIAGNOSIS: Left breast DCIS with acquired absence of bilateral breasts. POSTOPERATIVE DIAGNOSIS: Left breast DCIS with acquired absence of bilateral breasts.   PROCEDURE PERFORMED: Immediate bilateral breast reconstruction with tissue expander viability to facilitate immediate reconstruction. The pockets were irrigated with warm saline irrigation until all particulate fat was evacuated. Hemostasis was then secured with electrocautery.   Next, the pockets were irrigated with Betadine irrigation a of air. This permitted tension-free closure of the skin edges. The skin was then closed with interrupted 3-0 Vicryl deep dermal suture and running 4-0 Monocryl subcuticular suture. The drain sites were dressed with BioPatch and Tegaderm.  The incisions

## 2020-11-16 NOTE — H&P
History of Present Illness: This is the first visit for this 60year old woman, referred by Dr. Angeline Felipe, who presents for breast cancer risk evaluation related to her family history, which is detailed below.   The patient currently has not had any breast c pregnancy.     Medications:    No outpatient medications have been marked as taking for the 10/7/20 encounter (Office Visit) with Ronny Otoole MD.        Allergies:    No Known Allergies     Family History:        Family History   Problem Relation Ag frequent urination, needing to get up at night to urinate, urinary hesitancy or retaining urine, painful urination, urinary incontinence, decreased urine stream, blood in the urine or vaginal/penile discharge.     Skin:    The patient denies rash, itching, auscultation.     Heart: The rhythm is regular. There are no murmurs, rubs, gallops or thrills.     Breasts:  Her breasts are symmetrical with a cup size A.   Right breast[de-identified] The skin, nipple ,and areola appear normal. There is no skin dimpling with movemen cancer and up to a 7% lifetime risk of male breast cancer.  It was explained that these are generalized risks for BRCA2 carriers but that some BRCA2 mutations may be associated with a higher or lower risk for these cancers than others and/or cancer risks ca (controversial) for ovarian cancer. The patient is interested in considering a bilateral risk reducing mastectomy.   I have recommended an MRI now to establish baseline in light of her extremely dense breast tissue and to rule out any occult disease prior

## 2020-11-17 ENCOUNTER — NURSE NAVIGATOR ENCOUNTER (OUTPATIENT)
Dept: HEMATOLOGY/ONCOLOGY | Facility: HOSPITAL | Age: 55
End: 2020-11-17

## 2020-11-17 VITALS
RESPIRATION RATE: 18 BRPM | SYSTOLIC BLOOD PRESSURE: 112 MMHG | WEIGHT: 148.19 LBS | TEMPERATURE: 99 F | BODY MASS INDEX: 24.69 KG/M2 | OXYGEN SATURATION: 95 % | DIASTOLIC BLOOD PRESSURE: 64 MMHG | HEIGHT: 65 IN | HEART RATE: 86 BPM

## 2020-11-17 NOTE — PLAN OF CARE
Patient alert and oriented x4. Up independently in room. General diet, tolerating well. Pain controlled with PRN Norco. FIDEL drain x4, to bulb suction. Education provided on drain care, patient able to demonstrated correct care. Dressings clean/dry/intact.  C Notify MD/LIP if interventions unsuccessful or patient reports new pain  - Anticipate increased pain with activity and pre-medicate as appropriate  Outcome: Adequate for Discharge     Problem: SAFETY ADULT - FALL  Goal: Free from fall injury  Description:

## 2020-11-17 NOTE — PROGRESS NOTES
Plastic Surgery Progress Note    Bora Corado is a 60year old female POD#1 s/p bilateral nipple sparing mastectomy, left sentinel lymph node biopsy (Dr. Juanito Balderas) and immediate breast reconstruction with prepectoral bilateral tissue expander placement and

## 2020-11-17 NOTE — PROGRESS NOTES
Patient is POD #1 s/p bilateral nipple sparing mastectomy with left sentinel lymph node biopsy and immediate breast reconstruction with tissue expander placement. Patient is supported today by her son.   Plan for discharge home today following administra management. Provided extra tegaderm and biopatch if needed for dressing reinforcement or changes. Patient aware she will be prescribed antibiotic for infection prevention while drains are in place. This is to be taken 4 times daily.  And continued unti

## 2020-11-17 NOTE — CM/SW NOTE
Plan is for discharge home today 11/17 with [facility]. PETER Patel with [facility] is aware of discharge. Face to face has been entered.      Shady Rincon [facility] ext [phone]

## 2020-11-17 NOTE — PLAN OF CARE
Problem: Patient Centered Care  Goal: Patient preferences are identified and integrated in the patient's plan of care  Description: Interventions:  - What would you like us to know as we care for you?  I am motivated  - Provide timely, complete, and accur or patient reports new pain  - Anticipate increased pain with activity and pre-medicate as appropriate  Outcome: Progressing     Problem: SAFETY ADULT - FALL  Goal: Free from fall injury  Description: INTERVENTIONS:  - Assess pt frequently for physical nee

## 2020-11-17 NOTE — OPERATIVE REPORT
[facility]    PATIENT'S NAME: PHILIP BROWN   ATTENDING PHYSICIAN: Jenni Restrepo MD   OPERATING PHYSICIAN: Dee Guzman.  Tereza Solorzano MD   PATIENT ACCOUNT#:   [id number]    LOCATION:  [address]  MEDICAL RECORD #:   [id number] sparing mastectomy instead of the usual procedure of simple mastectomy was necessary. This required surgical assistance in order to allow for adequate visualization to accomplish the surgery.   This also required additional time and effort and increased co presence of metastatic disease, and therefore no completion axillary lymph node dissection was performed. This wound was irrigated. Hemostasis assured with electrocautery and hemoclips.   It was closed using interrupted 3-0 Vicryl for deep layer and a run nipple and a long stitch at the axillary tail and sent for permanent pathologic evaluation. Her wound was irrigated.   Hemostasis assured with electrocautery, hemoclips and a moist laparotomy pad was left in place for the remainder of the procedure, which

## 2020-11-22 ENCOUNTER — TELEPHONE (OUTPATIENT)
Dept: SURGERY | Facility: CLINIC | Age: 55
End: 2020-11-22

## 2020-11-22 DIAGNOSIS — Z90.10 ABSENCE OF BREAST, UNSPECIFIED LATERALITY: Primary | ICD-10-CM

## 2020-11-22 RX ORDER — HYDROCODONE BITARTRATE AND ACETAMINOPHEN 5; 325 MG/1; MG/1
1-2 TABLET ORAL EVERY 4 HOURS PRN
Qty: 30 TABLET | Refills: 0 | Status: SHIPPED | OUTPATIENT
Start: 2020-11-22 | End: 2020-11-24 | Stop reason: ALTCHOICE

## 2020-11-22 NOTE — TELEPHONE ENCOUNTER
Pt called with regard to her pain medication. She had her last Norco 5 this morning and is requesting a refill. This was electronically called into her pharmacy listed in [facility]. Questions were answered.

## 2020-11-23 ENCOUNTER — OFFICE VISIT (OUTPATIENT)
Dept: SURGERY | Facility: CLINIC | Age: 55
End: 2020-11-23
Payer: COMMERCIAL

## 2020-11-23 VITALS
HEART RATE: 78 BPM | WEIGHT: 148.13 LBS | TEMPERATURE: 98 F | BODY MASS INDEX: 24.68 KG/M2 | SYSTOLIC BLOOD PRESSURE: 114 MMHG | RESPIRATION RATE: 18 BRPM | DIASTOLIC BLOOD PRESSURE: 75 MMHG | HEIGHT: 65 IN | OXYGEN SATURATION: 98 %

## 2020-11-23 NOTE — PROGRESS NOTES
Post-Operative Visit    Diagnosis: DCIS, left breast; status post bilateral skin sparing mastectomy with immediate reconstruction  on 11/16/20    Disease Status:  Surgical treatment complete    History:    This 60year old woman presented for breast cancer office with any questions or concerns prior to her next scheduled appointment.

## 2020-11-24 ENCOUNTER — OFFICE VISIT (OUTPATIENT)
Dept: SURGERY | Facility: CLINIC | Age: 55
End: 2020-11-24
Payer: COMMERCIAL

## 2020-11-24 VITALS
HEART RATE: 99 BPM | DIASTOLIC BLOOD PRESSURE: 77 MMHG | SYSTOLIC BLOOD PRESSURE: 118 MMHG | RESPIRATION RATE: 16 BRPM | OXYGEN SATURATION: 99 %

## 2020-11-24 DIAGNOSIS — Z15.01 BRCA2 GENE MUTATION POSITIVE: Primary | ICD-10-CM

## 2020-11-24 DIAGNOSIS — Z90.13 ABSENCE OF BREAST, BILATERAL: Primary | ICD-10-CM

## 2020-11-24 DIAGNOSIS — Z15.09 BRCA2 GENE MUTATION POSITIVE: Primary | ICD-10-CM

## 2020-11-24 DIAGNOSIS — D05.12 DUCTAL CARCINOMA IN SITU (DCIS) OF LEFT BREAST: ICD-10-CM

## 2020-11-24 PROCEDURE — 99024 POSTOP FOLLOW-UP VISIT: CPT | Performed by: PHYSICIAN ASSISTANT

## 2020-11-24 NOTE — PROGRESS NOTES
Javid Cross is a 60year old female POD#8 s/p bilateral nipple sparing mastectomy, left sentinel lymph node biopsy (Dr. Tabor Dear) and immediate breast reconstruction with prepectoral bilateral tissue expander placement and alloderm ADM (Dr. Carmela Arguello).   She the remaining drains have been removed.

## 2020-11-27 NOTE — PROGRESS NOTES
Patient presents today for a post-op nurse visit. Pt had a  Bilateral Skin Sparring Mastectomy with Left Balsam Lymph Node Biopsy and recon on 11/16. Pt has been healing well since surgery.   Pain has been tolerable and managed with norco.  Educated pt

## 2020-11-30 ENCOUNTER — NURSE ONLY (OUTPATIENT)
Dept: SURGERY | Facility: CLINIC | Age: 55
End: 2020-11-30
Payer: COMMERCIAL

## 2020-11-30 PROCEDURE — 1111F DSCHRG MED/CURRENT MED MERGE: CPT | Performed by: SURGERY

## 2020-11-30 PROCEDURE — 99024 POSTOP FOLLOW-UP VISIT: CPT | Performed by: SURGERY

## 2020-11-30 NOTE — PROGRESS NOTES
The patient presents today for final drain removal.  Per patient's written record, both right and left drains were below 30cc for two consecutive days. Both drains were removed due to low output and the patient tolerated this well.   All incision care a

## 2020-12-04 ENCOUNTER — OFFICE VISIT (OUTPATIENT)
Dept: SURGERY | Facility: CLINIC | Age: 55
End: 2020-12-04
Payer: COMMERCIAL

## 2020-12-04 DIAGNOSIS — Z90.13 ABSENCE OF BREAST, BILATERAL: Primary | ICD-10-CM

## 2020-12-04 PROCEDURE — 99024 POSTOP FOLLOW-UP VISIT: CPT | Performed by: SURGERY

## 2020-12-04 RX ORDER — HYDROCODONE BITARTRATE AND ACETAMINOPHEN 5; 325 MG/1; MG/1
1-2 TABLET ORAL EVERY 6 HOURS PRN
Qty: 20 TABLET | Refills: 0 | Status: SHIPPED | OUTPATIENT
Start: 2020-12-04 | End: 2020-12-09 | Stop reason: ALTCHOICE

## 2020-12-04 NOTE — PROGRESS NOTES
Chinmay Gupta is a 60year old female who presents today for a follow-up s/p bilateral nipple sparing mastectomy, left sentinel lymph node biopsy (Dr. Althea Guillen) and immediate breast reconstruction with prepectoral bilateral tissue expander placement and all with Dr. Bryon Langford. Questions were answered. Patient understands.      [place], [place]  12/4/2020  4:10 PM

## 2020-12-08 NOTE — PROGRESS NOTES
Breast Surgery Post-Operative Visit    Diagnosis: DCIS, left breast; status post bilateral skin sparing mastectomy with L SLNB and immediate reconstruction with expander placement on 11/16/20    Stage: TisNxMx    Disease Status:  Surgical treatment complet MAIN OR   • COLONOSCOPY N/A 2014    @ [facility]   • EGD N/A    • NEEDLE BIOPSY LEFT Left 05/2018    benign   • NEEDLE BIOPSY RIGHT Right 05/2018    benign   • OTHER N/A 2000    vocal chord surgery- extra/benign tissue       Medications:     Current Ou There is no history of difficulty or pain with swallowing, reflux symptoms, vomiting, dark or bloody stools, constipation, yellowing of the skin, indigestion, nausea, change in bowel habits, diarrhea, abdominal pain or vomiting blood.      Genitourinary: and is without palpable masses/nodules. There are no palpable masses. The trachea is in the midline. Conjunctiva are clear, non-icteric.     Chest: The chest expands symmetrically. The lungs are clear to auscultation.     Heart: The rhythm is regular.   The

## 2020-12-09 ENCOUNTER — TELEPHONE (OUTPATIENT)
Dept: PHYSICAL THERAPY | Facility: HOSPITAL | Age: 55
End: 2020-12-09

## 2020-12-09 ENCOUNTER — OFFICE VISIT (OUTPATIENT)
Dept: SURGERY | Facility: CLINIC | Age: 55
End: 2020-12-09
Payer: COMMERCIAL

## 2020-12-09 ENCOUNTER — OFFICE VISIT (OUTPATIENT)
Dept: PHYSICAL THERAPY | Facility: HOSPITAL | Age: 55
End: 2020-12-09
Attending: SURGERY
Payer: COMMERCIAL

## 2020-12-09 VITALS
OXYGEN SATURATION: 97 % | DIASTOLIC BLOOD PRESSURE: 91 MMHG | SYSTOLIC BLOOD PRESSURE: 132 MMHG | HEART RATE: 97 BPM | RESPIRATION RATE: 16 BRPM

## 2020-12-09 DIAGNOSIS — C50.919 BREAST CANCER (HCC): ICD-10-CM

## 2020-12-09 DIAGNOSIS — Z15.01 BRCA2 GENE MUTATION POSITIVE: ICD-10-CM

## 2020-12-09 DIAGNOSIS — Z15.09 BRCA2 GENE MUTATION POSITIVE: ICD-10-CM

## 2020-12-09 DIAGNOSIS — C50.919 BREAST CANCER (HCC): Primary | ICD-10-CM

## 2020-12-09 DIAGNOSIS — D05.12 DUCTAL CARCINOMA IN SITU (DCIS) OF LEFT BREAST: Primary | ICD-10-CM

## 2020-12-09 PROCEDURE — 97161 PT EVAL LOW COMPLEX 20 MIN: CPT | Performed by: PHYSICAL THERAPIST

## 2020-12-09 PROCEDURE — 97110 THERAPEUTIC EXERCISES: CPT | Performed by: PHYSICAL THERAPIST

## 2020-12-09 PROCEDURE — 3080F DIAST BP >= 90 MM HG: CPT | Performed by: SURGERY

## 2020-12-09 PROCEDURE — 97140 MANUAL THERAPY 1/> REGIONS: CPT | Performed by: PHYSICAL THERAPIST

## 2020-12-09 PROCEDURE — 99024 POSTOP FOLLOW-UP VISIT: CPT | Performed by: SURGERY

## 2020-12-09 PROCEDURE — 3075F SYST BP GE 130 - 139MM HG: CPT | Performed by: SURGERY

## 2020-12-09 NOTE — TELEPHONE ENCOUNTER
Call received from [address] [place] [address], Alexandriadenae Corrigan, that Olinda Lugo will need to have some therapy s/p Breast surgery.

## 2020-12-09 NOTE — PROGRESS NOTES
UE LYMPHEDEMA EVALUATION:     Referring Physician: Dr. Chavo Lal  Diagnosis: breast cancer     Date of onset: 11/16/2020 Evaluation Date: 12/9/2020     PATIENT SUMMARY   Kenny Barr is a 60year old female who presents to therapy today w/ c/o pain and spears sensation B trunk and axilla    AROM/Strength:     Shoulder AROM:    Flex: R 115, L 105    Abd: R 110, L 85   Shoulder strength: NT due to recent surgery     Posture: mm guarding, elevated shoulders    Edema/Tissue Observations:    - swelling B breasts, ax symptoms improve.      Treatment will include: Complete Decongestive Therapy: Manual Therapy, Self-Care/Home Management Training, Therapeutic Exercise, Neuromuscular Re-education, Orthotic Management and Training      Patient/Family/Caregiver was advised of

## 2020-12-10 ENCOUNTER — OFFICE VISIT (OUTPATIENT)
Dept: PHYSICAL THERAPY | Facility: HOSPITAL | Age: 55
End: 2020-12-10
Attending: SURGERY
Payer: COMMERCIAL

## 2020-12-10 PROCEDURE — 97110 THERAPEUTIC EXERCISES: CPT | Performed by: PHYSICAL THERAPIST

## 2020-12-10 PROCEDURE — 97140 MANUAL THERAPY 1/> REGIONS: CPT | Performed by: PHYSICAL THERAPIST

## 2020-12-10 NOTE — PROGRESS NOTES
Referring Physician: Dr. Mindi Magallanes  Diagnosis: breast cancer     Date of onset: 11/16/2020 Evaluation Date: 12/9/2020         Physical Therapy Lymphedema Daily Note    Education or treatment limitation:  breast cancer, expanders    Precautions:  None    Pas MLD/STM, ROM exercises, and donning/doffing compression bandages/garments (if needed) to be independent at self management once discharged  3) Increase B shoulder AROM flex and abd to at least 155 degres to improve ease of dressing/bathing/and reaching ove

## 2020-12-11 ENCOUNTER — OFFICE VISIT (OUTPATIENT)
Dept: SURGERY | Facility: CLINIC | Age: 55
End: 2020-12-11
Payer: COMMERCIAL

## 2020-12-11 DIAGNOSIS — Z90.13 ABSENCE OF BREAST, BILATERAL: Primary | ICD-10-CM

## 2020-12-11 PROCEDURE — 99024 POSTOP FOLLOW-UP VISIT: CPT | Performed by: SURGERY

## 2020-12-11 NOTE — PROGRESS NOTES
Jose Green is a 60year old female who presents today for a follow-up. She is without new complaints. Physical Examination:  Breasts: Bilateral breast incisions are clean dry and intact. There is no erythema or seroma noted.   Procedure: Yu Waters

## 2020-12-16 ENCOUNTER — APPOINTMENT (OUTPATIENT)
Dept: PHYSICAL THERAPY | Facility: HOSPITAL | Age: 55
End: 2020-12-16
Attending: SURGERY
Payer: COMMERCIAL

## 2020-12-17 ENCOUNTER — OFFICE VISIT (OUTPATIENT)
Dept: PHYSICAL THERAPY | Facility: HOSPITAL | Age: 55
End: 2020-12-17
Attending: SURGERY
Payer: COMMERCIAL

## 2020-12-17 PROCEDURE — 97140 MANUAL THERAPY 1/> REGIONS: CPT

## 2020-12-18 ENCOUNTER — OFFICE VISIT (OUTPATIENT)
Dept: SURGERY | Facility: CLINIC | Age: 55
End: 2020-12-18
Payer: COMMERCIAL

## 2020-12-18 DIAGNOSIS — Z90.13 ABSENCE OF BREAST, BILATERAL: Primary | ICD-10-CM

## 2020-12-18 PROCEDURE — 99024 POSTOP FOLLOW-UP VISIT: CPT | Performed by: SURGERY

## 2020-12-18 NOTE — PROGRESS NOTES
Malcolm Mejia is a 60year old female who presents today for a follow-up. She is without new complaints.      Physical Examination:  Breasts: Bilateral breast incisions are clean dry and intact. There is no erythema or seroma noted.     Procedure: Bilate

## 2020-12-21 ENCOUNTER — OFFICE VISIT (OUTPATIENT)
Dept: PHYSICAL THERAPY | Facility: HOSPITAL | Age: 55
End: 2020-12-21
Attending: SURGERY
Payer: COMMERCIAL

## 2020-12-21 PROCEDURE — 97110 THERAPEUTIC EXERCISES: CPT | Performed by: PHYSICAL THERAPIST

## 2020-12-21 PROCEDURE — 97140 MANUAL THERAPY 1/> REGIONS: CPT | Performed by: PHYSICAL THERAPIST

## 2020-12-21 NOTE — PROGRESS NOTES
Referring Physician: Dr. Thierry Farias  Diagnosis: breast cancer     Date of onset: 11/16/2020 Evaluation Date: 12/9/2020         Physical Therapy Lymphedema Daily Note    Education or treatment limitation:  breast cancer, expanders    Precautions:  None    Pas flex and abd x 10  - shld flex and abd x 10   There Ex (10 min)  - pulleys  - wall washes circles in flex (CW/CCW  - reviewed HEP There Ex (5 minutes)  Continue with home pulleys There ex (15 min)  - Manual stretching B shld in  Supine  - shld flex, abd, a

## 2020-12-28 ENCOUNTER — OFFICE VISIT (OUTPATIENT)
Dept: SURGERY | Facility: CLINIC | Age: 55
End: 2020-12-28
Payer: COMMERCIAL

## 2020-12-28 DIAGNOSIS — Z90.13 ABSENCE OF BREAST, BILATERAL: Primary | ICD-10-CM

## 2020-12-28 PROCEDURE — 99024 POSTOP FOLLOW-UP VISIT: CPT | Performed by: PHYSICIAN ASSISTANT

## 2020-12-28 NOTE — PROGRESS NOTES
Mattie Pires is a 60year old female 6 weeks s/p bilateral nipple sparing mastectomy, left sentinel lymph node biopsy (Dr. Becki Mcclendon) and immediate breast reconstruction with prepectoral bilateral tissue expander placement and alloderm ADM (Dr. Kris Infante).   Sh

## 2020-12-29 ENCOUNTER — TELEPHONE (OUTPATIENT)
Dept: SURGERY | Facility: CLINIC | Age: 55
End: 2020-12-29

## 2020-12-29 ENCOUNTER — OFFICE VISIT (OUTPATIENT)
Dept: PHYSICAL THERAPY | Facility: HOSPITAL | Age: 55
End: 2020-12-29
Attending: SURGERY
Payer: COMMERCIAL

## 2020-12-29 PROCEDURE — 97110 THERAPEUTIC EXERCISES: CPT

## 2020-12-29 PROCEDURE — 97140 MANUAL THERAPY 1/> REGIONS: CPT

## 2020-12-29 NOTE — TELEPHONE ENCOUNTER
Pt called to discuss a return to work note. Note was sent directly to the patient via [address]. She will. return to work on 01/11/2021, but will be working from home.  I will notify the SW

## 2020-12-29 NOTE — PROGRESS NOTES
Referring Physician: Dr. Melodie Faria  Diagnosis: breast cancer     Date of onset: 11/16/2020 Evaluation Date: 12/9/2020         Physical Therapy Lymphedema Daily Note    Education or treatment limitation:  breast cancer, expanders    Precautions:  None    Pas arm    Manual stretching B shld in  supine    Manual therapy (33 minutes)  STM B axilla, trunk, breasts and L inner/upper arms     There Ex ( 15 minutes):  - pulleys x 5 min  - cane flex and abd x 10  - shld flex and abd x 10   There Ex (10 min)  - pulleys over a 90 day period. Frequency will decrease as symptoms improve.      Treatment will include: Complete Decongestive Therapy: Manual Therapy, Self-Care/Home Management Training, Therapeutic Exercise, Neuromuscular Re-education, Orthotic Management and [place]

## 2021-01-04 ENCOUNTER — OFFICE VISIT (OUTPATIENT)
Dept: SURGERY | Facility: CLINIC | Age: 56
End: 2021-01-04
Payer: COMMERCIAL

## 2021-01-04 DIAGNOSIS — Z90.13 ABSENCE OF BREAST, BILATERAL: Primary | ICD-10-CM

## 2021-01-04 PROCEDURE — 99024 POSTOP FOLLOW-UP VISIT: CPT | Performed by: PHYSICIAN ASSISTANT

## 2021-01-04 NOTE — PROGRESS NOTES
Ale Islas is a 60year old female who presents today for a follow-up s/p bilateral nipple sparing mastectomy, left sentinel lymph node biopsy (Dr. Clarence Quevedo) and immediate breast reconstruction with prepectoral bilateral tissue expander placement and all

## 2021-01-05 ENCOUNTER — OFFICE VISIT (OUTPATIENT)
Dept: PHYSICAL THERAPY | Facility: HOSPITAL | Age: 56
End: 2021-01-05
Attending: SURGERY
Payer: COMMERCIAL

## 2021-01-05 PROCEDURE — 97140 MANUAL THERAPY 1/> REGIONS: CPT | Performed by: PHYSICAL THERAPIST

## 2021-01-05 NOTE — PROGRESS NOTES
Referring Physician: Dr. Naya Kc  Diagnosis: breast cancer     Date of onset: 11/16/2020 Evaluation Date: 12/9/2020         Physical Therapy Lymphedema Daily Note    Education or treatment limitation:  breast cancer, expanders    Precautions:  None    P be due to recent fill of her expander (yesterday). Goals:    To be met in 10 visits:  1) Decrease swelling B axilla, trunk, and breasts to [facility] to decrease patient's pain to tolerate wearing bra  2) Pt to be independent with self MLD/STM, ROM exercises, a

## 2021-01-12 ENCOUNTER — OFFICE VISIT (OUTPATIENT)
Dept: SURGERY | Facility: CLINIC | Age: 56
End: 2021-01-12
Payer: COMMERCIAL

## 2021-01-12 DIAGNOSIS — Z90.13 ABSENCE OF BREAST, BILATERAL: Primary | ICD-10-CM

## 2021-01-12 PROCEDURE — 99024 POSTOP FOLLOW-UP VISIT: CPT | Performed by: SURGERY

## 2021-01-12 NOTE — PROGRESS NOTES
Andi Canoing a 60year old female who presents today for a follow-up.  She is without new complaints.      Physical Examination:  Breasts: Bilateral breast incisions are clean dry and intact.  There is no erythema or seroma noted.     Procedure: Bilate

## 2021-01-13 ENCOUNTER — OFFICE VISIT (OUTPATIENT)
Dept: PHYSICAL THERAPY | Facility: HOSPITAL | Age: 56
End: 2021-01-13
Attending: SURGERY
Payer: COMMERCIAL

## 2021-01-13 PROCEDURE — 97110 THERAPEUTIC EXERCISES: CPT | Performed by: PHYSICAL THERAPIST

## 2021-01-13 PROCEDURE — 97140 MANUAL THERAPY 1/> REGIONS: CPT | Performed by: PHYSICAL THERAPIST

## 2021-01-13 NOTE — PROGRESS NOTES
Referring Physician: Dr. Shaina Christensen  Diagnosis: breast cancer     Date of onset: 11/16/2020 Evaluation Date: 12/9/2020         Physical Therapy Lymphedema Daily Note    Education or treatment limitation:  breast cancer, expanders    Precautions:  None    P shoulders in supine  -reviewed wall stretches  -shld flexion abduction and horiz abb/add x 10 in standing  -door way stretch multiple levels x 10 (printed this exercise for HEP for pt today) There Ex (5 min)    - pulleys flex and abd  - thoracic ext in sta

## 2021-01-22 ENCOUNTER — OFFICE VISIT (OUTPATIENT)
Dept: SURGERY | Facility: CLINIC | Age: 56
End: 2021-01-22
Payer: COMMERCIAL

## 2021-01-22 DIAGNOSIS — Z90.13 ABSENCE OF BREAST, BILATERAL: Primary | ICD-10-CM

## 2021-01-22 PROCEDURE — 99024 POSTOP FOLLOW-UP VISIT: CPT | Performed by: SURGERY

## 2021-01-22 NOTE — PATIENT INSTRUCTIONS
Surgeon: Dr. Bertrand Botello      Tel:  [phone]    Fax: [phone]     Surgery/Procedure: Removal of bilateral tissue expanders and placement of permanent silicone implants. Autologous fat grafting to bilateral breasts.  2.5 hours, general anesthesia,

## 2021-01-22 NOTE — PROGRESS NOTES
Gage Azevedo is a 60year old female who presents today for her final fill. Physical Examination:  Breasts: Bilateral breast incisions are clean dry and intact. There is no erythema or seroma noted.   Moderate central abdominal flank lipodystrophy is

## 2021-01-25 ENCOUNTER — OFFICE VISIT (OUTPATIENT)
Dept: PHYSICAL THERAPY | Facility: HOSPITAL | Age: 56
End: 2021-01-25
Attending: SURGERY
Payer: COMMERCIAL

## 2021-01-25 ENCOUNTER — TELEPHONE (OUTPATIENT)
Dept: SURGERY | Facility: CLINIC | Age: 56
End: 2021-01-25

## 2021-01-25 DIAGNOSIS — Z90.13 ABSENCE OF BREAST, BILATERAL: Primary | ICD-10-CM

## 2021-01-25 PROCEDURE — 97140 MANUAL THERAPY 1/> REGIONS: CPT | Performed by: PHYSICAL THERAPIST

## 2021-01-25 NOTE — TELEPHONE ENCOUNTER
Late entry:  Spoke to patient on 1/25 morning. She reports tightness of her right breast after the final expansion. She denies fevers or chills. She relates experiencing similar discomfort with the last fill which improved with physical therapy.  The becki

## 2021-01-25 NOTE — PROGRESS NOTES
Referring Physician: Dr. Daphne Stanford  Diagnosis: breast cancer     Date of onset: 11/16/2020 Evaluation Date: 12/9/2020         Physical Therapy Lymphedema Daily Note    Education or treatment limitation:  breast cancer, expanders    Precautions:  None    P Response:   12/29/2020 1/5/2021 1/5/2021 1/25/2021   5/10 6/10 7/10 8/10   Manual therapy (33 minutes)  STM B axilla, trunk, breasts and L inner/upper arms   Manual therapy (37 minutes)  STM B axilla, trunk, breasts and L inner/upper arms.  MLD technique to needed after filling of expanders  Frequency / Duration: Patient will be seen for a total of 10 visits over a 90 day period. Frequency will decrease as symptoms improve.      Treatment will include: Complete Decongestive Therapy: Manual Therapy, Self-Care/H

## 2021-01-25 NOTE — TELEPHONE ENCOUNTER
Spoke to patient regarding her newly scheduled second stage reconstruction with Dr. Farooq Alejo on 5/6/21. She stated if she is to wait that long she would like another fill. She is currently at 610cc bilaterally and her expander is 550cc.  I will route this mes

## 2021-01-25 NOTE — TELEPHONE ENCOUNTER
I called the patient and scheduled her procedure on 03/25/2021 at [facility]. Confirmed date and location.

## 2021-01-26 ENCOUNTER — SOCIAL WORK SERVICES (OUTPATIENT)
Dept: HEMATOLOGY/ONCOLOGY | Facility: HOSPITAL | Age: 56
End: 2021-01-26

## 2021-01-26 ENCOUNTER — TELEPHONE (OUTPATIENT)
Dept: SURGERY | Facility: CLINIC | Age: 56
End: 2021-01-26

## 2021-01-26 NOTE — PROGRESS NOTES
FMLA paperwork received with indication the pt has been off since first surgery 11/16/2020. Pt to have surgery by Dr. Banks Congress on 03/25/2021 and is recommended to remain off until 4 weeks later, 04/26/2021. SW completed the paperwork with this information.

## 2021-01-26 NOTE — TELEPHONE ENCOUNTER
Received pt FMLA for her upcoming procedure on 3/25/21. We talked in length about the physical demands of her job and she does not feel comfortable returning to work yet.  She is to remain off of work until she is healed form her second stage reconstruction

## 2021-01-27 DIAGNOSIS — Z90.13 ABSENCE OF BREAST, BILATERAL: Primary | ICD-10-CM

## 2021-02-16 ENCOUNTER — APPOINTMENT (OUTPATIENT)
Dept: HEMATOLOGY/ONCOLOGY | Facility: HOSPITAL | Age: 56
End: 2021-02-16
Attending: NURSE PRACTITIONER
Payer: COMMERCIAL

## 2021-02-16 ENCOUNTER — NURSE TRIAGE (OUTPATIENT)
Dept: FAMILY MEDICINE CLINIC | Facility: CLINIC | Age: 56
End: 2021-02-16

## 2021-02-16 ENCOUNTER — OFFICE VISIT (OUTPATIENT)
Dept: FAMILY MEDICINE CLINIC | Facility: CLINIC | Age: 56
End: 2021-02-16
Payer: COMMERCIAL

## 2021-02-16 VITALS
BODY MASS INDEX: 27.32 KG/M2 | WEIGHT: 164 LBS | SYSTOLIC BLOOD PRESSURE: 109 MMHG | DIASTOLIC BLOOD PRESSURE: 76 MMHG | HEART RATE: 86 BPM | HEIGHT: 65 IN | TEMPERATURE: 97 F | RESPIRATION RATE: 20 BRPM

## 2021-02-16 DIAGNOSIS — B96.89 BACTERIAL VAGINOSIS: ICD-10-CM

## 2021-02-16 DIAGNOSIS — N76.0 BACTERIAL VAGINOSIS: ICD-10-CM

## 2021-02-16 PROCEDURE — 3008F BODY MASS INDEX DOCD: CPT | Performed by: FAMILY MEDICINE

## 2021-02-16 PROCEDURE — 3074F SYST BP LT 130 MM HG: CPT | Performed by: FAMILY MEDICINE

## 2021-02-16 PROCEDURE — 99213 OFFICE O/P EST LOW 20 MIN: CPT | Performed by: FAMILY MEDICINE

## 2021-02-16 PROCEDURE — 3078F DIAST BP <80 MM HG: CPT | Performed by: FAMILY MEDICINE

## 2021-02-16 RX ORDER — METRONIDAZOLE 500 MG/1
500 TABLET ORAL 2 TIMES DAILY
Qty: 14 TABLET | Refills: 0 | Status: SHIPPED | OUTPATIENT
Start: 2021-02-16 | End: 2021-03-01

## 2021-02-16 NOTE — TELEPHONE ENCOUNTER
Action Requested: Summary for Provider     []  Critical Lab, Recommendations Needed  [] Need Additional Advice  []   FYI    []   Need Orders  [] Need Medications Sent to Pharmacy  []  Other     SUMMARY: reports vaginal odor and yellow vaginal discharge for

## 2021-03-01 ENCOUNTER — LAB ENCOUNTER (OUTPATIENT)
Dept: LAB | Age: 56
End: 2021-03-01
Attending: FAMILY MEDICINE
Payer: COMMERCIAL

## 2021-03-01 ENCOUNTER — TELEPHONE (OUTPATIENT)
Dept: FAMILY MEDICINE CLINIC | Facility: CLINIC | Age: 56
End: 2021-03-01

## 2021-03-01 ENCOUNTER — OFFICE VISIT (OUTPATIENT)
Dept: FAMILY MEDICINE CLINIC | Facility: CLINIC | Age: 56
End: 2021-03-01
Payer: COMMERCIAL

## 2021-03-01 VITALS
WEIGHT: 168 LBS | TEMPERATURE: 97 F | BODY MASS INDEX: 27.99 KG/M2 | DIASTOLIC BLOOD PRESSURE: 75 MMHG | RESPIRATION RATE: 20 BRPM | HEIGHT: 65 IN | HEART RATE: 101 BPM | SYSTOLIC BLOOD PRESSURE: 106 MMHG

## 2021-03-01 DIAGNOSIS — Z01.818 PREOPERATIVE GENERAL PHYSICAL EXAMINATION: ICD-10-CM

## 2021-03-01 DIAGNOSIS — Z01.818 PREOP EXAMINATION: Primary | ICD-10-CM

## 2021-03-01 DIAGNOSIS — Z85.3 HISTORY OF BREAST CANCER: ICD-10-CM

## 2021-03-01 LAB
ALBUMIN SERPL-MCNC: 3.7 G/DL (ref 3.4–5)
ALBUMIN/GLOB SERPL: 1 {RATIO} (ref 1–2)
ALP LIVER SERPL-CCNC: 82 U/L
ALT SERPL-CCNC: 26 U/L
ANION GAP SERPL CALC-SCNC: 3 MMOL/L (ref 0–18)
AST SERPL-CCNC: 16 U/L (ref 15–37)
BASOPHILS # BLD AUTO: 0.04 X10(3) UL (ref 0–0.2)
BASOPHILS NFR BLD AUTO: 0.9 %
BILIRUB SERPL-MCNC: 0.3 MG/DL (ref 0.1–2)
BUN BLD-MCNC: 10 MG/DL (ref 7–18)
BUN/CREAT SERPL: 11.6 (ref 10–20)
CALCIUM BLD-MCNC: 9.4 MG/DL (ref 8.5–10.1)
CHLORIDE SERPL-SCNC: 110 MMOL/L (ref 98–112)
CO2 SERPL-SCNC: 29 MMOL/L (ref 21–32)
CREAT BLD-MCNC: 0.86 MG/DL
DEPRECATED RDW RBC AUTO: 44.2 FL (ref 35.1–46.3)
EOSINOPHIL # BLD AUTO: 0.37 X10(3) UL (ref 0–0.7)
EOSINOPHIL NFR BLD AUTO: 8.2 %
ERYTHROCYTE [DISTWIDTH] IN BLOOD BY AUTOMATED COUNT: 13 % (ref 11–15)
GLOBULIN PLAS-MCNC: 3.6 G/DL (ref 2.8–4.4)
GLUCOSE BLD-MCNC: 84 MG/DL (ref 70–99)
HCT VFR BLD AUTO: 41.9 %
HGB BLD-MCNC: 13.5 G/DL
IMM GRANULOCYTES # BLD AUTO: 0.01 X10(3) UL (ref 0–1)
IMM GRANULOCYTES NFR BLD: 0.2 %
LYMPHOCYTES # BLD AUTO: 1.69 X10(3) UL (ref 1–4)
LYMPHOCYTES NFR BLD AUTO: 37.4 %
M PROTEIN MFR SERPL ELPH: 7.3 G/DL (ref 6.4–8.2)
MCH RBC QN AUTO: 29.6 PG (ref 26–34)
MCHC RBC AUTO-ENTMCNC: 32.2 G/DL (ref 31–37)
MCV RBC AUTO: 91.9 FL
MONOCYTES # BLD AUTO: 0.44 X10(3) UL (ref 0.1–1)
MONOCYTES NFR BLD AUTO: 9.7 %
NEUTROPHILS # BLD AUTO: 1.97 X10 (3) UL (ref 1.5–7.7)
NEUTROPHILS # BLD AUTO: 1.97 X10(3) UL (ref 1.5–7.7)
NEUTROPHILS NFR BLD AUTO: 43.6 %
OSMOLALITY SERPL CALC.SUM OF ELEC: 292 MOSM/KG (ref 275–295)
PATIENT FASTING Y/N/NP: YES
PLATELET # BLD AUTO: 264 10(3)UL (ref 150–450)
POTASSIUM SERPL-SCNC: 4.1 MMOL/L (ref 3.5–5.1)
RBC # BLD AUTO: 4.56 X10(6)UL
SODIUM SERPL-SCNC: 142 MMOL/L (ref 136–145)
WBC # BLD AUTO: 4.5 X10(3) UL (ref 4–11)

## 2021-03-01 PROCEDURE — 80053 COMPREHEN METABOLIC PANEL: CPT

## 2021-03-01 PROCEDURE — 3078F DIAST BP <80 MM HG: CPT | Performed by: FAMILY MEDICINE

## 2021-03-01 PROCEDURE — 93005 ELECTROCARDIOGRAM TRACING: CPT

## 2021-03-01 PROCEDURE — 93010 ELECTROCARDIOGRAM REPORT: CPT | Performed by: FAMILY MEDICINE

## 2021-03-01 PROCEDURE — 3008F BODY MASS INDEX DOCD: CPT | Performed by: FAMILY MEDICINE

## 2021-03-01 PROCEDURE — 3074F SYST BP LT 130 MM HG: CPT | Performed by: FAMILY MEDICINE

## 2021-03-01 PROCEDURE — 85025 COMPLETE CBC W/AUTO DIFF WBC: CPT

## 2021-03-01 PROCEDURE — 99214 OFFICE O/P EST MOD 30 MIN: CPT | Performed by: FAMILY MEDICINE

## 2021-03-01 PROCEDURE — 36415 COLL VENOUS BLD VENIPUNCTURE: CPT

## 2021-03-01 NOTE — PROGRESS NOTES
HPI:    Patient ID: Lalitha Grey is a 60year old female. Patient is here for for preoperative history and physical. The patient will be having surgery with Dr. Myron Palencia for breast reconstruction surgery after bilateral mastectomy from breast cancer.  Sh no abdominal tenderness. Musculoskeletal: Normal range of motion. Lymphadenopathy:     She has no cervical adenopathy. Neurological: She is alert and oriented to person, place, and time. She has normal reflexes. Skin: Skin is warm and dry.    Psychi

## 2021-03-01 NOTE — TELEPHONE ENCOUNTER
Pt seen her ekg results in mychart with some abnormality and concerned. Pt is scheduled March 25th, 2021 for bilateral reconstructive breast surgery by Dr Carla Anne.       Tasked to Dr Henry Calvo  Please reply to pool: CHAD Rand

## 2021-03-01 NOTE — TELEPHONE ENCOUNTER
Pt contacted and EKG results discussed. Pt has had no symptoms and this is unchanged from November EKG which was read by cardiology as normal. Reassurance provided.

## 2021-03-15 NOTE — PROGRESS NOTES
Discharge Summary  Patient was seen for 8 visits in Physical Therapy. Patient was last seen for therapy on 1/25/2021. At that time pt w/ improved ROM, strength, and less pain and swelling. Patient has not returned for further treatment.     Patient is [facility]

## 2021-03-16 ENCOUNTER — OFFICE VISIT (OUTPATIENT)
Dept: HEMATOLOGY/ONCOLOGY | Facility: HOSPITAL | Age: 56
End: 2021-03-16
Attending: NURSE PRACTITIONER
Payer: COMMERCIAL

## 2021-03-16 DIAGNOSIS — Z71.9 COUNSELING, UNSPECIFIED: ICD-10-CM

## 2021-03-16 DIAGNOSIS — Z85.3 PERSONAL HISTORY OF BREAST CANCER: ICD-10-CM

## 2021-03-16 DIAGNOSIS — Z08 ENCOUNTER FOR FOLLOW-UP EXAMINATION AFTER COMPLETED TREATMENT FOR MALIGNANT NEOPLASM: Primary | ICD-10-CM

## 2021-03-16 PROCEDURE — 99215 OFFICE O/P EST HI 40 MIN: CPT | Performed by: NURSE PRACTITIONER

## 2021-03-16 NOTE — PROGRESS NOTES
I met with Clark Edwards for a Survivorship Clinic visit to provide a survivorship care plan (SCP) and information related to post-treatment care. She has a diagnosis of left breast DCIS, ER-/AR-.   She has a significant family history and had genetic testing that Care and specialists: Dr. Gaby Craig will continue to manage all general health care recommended for age and gender including cancer screening tests. She was encouraged to continue to see specialists at usual intervals.      Reviewed concerning symptoms that she few questions and verbalized understanding of the information we discussed today.   My total time spent caring for the patient on the day of the encounter: 90 minutes (10 minutes reviewing chart, 70 minutes of face to face counseling regarding survivorship

## 2021-03-22 ENCOUNTER — TELEPHONE (OUTPATIENT)
Dept: SURGERY | Facility: CLINIC | Age: 56
End: 2021-03-22

## 2021-03-22 NOTE — TELEPHONE ENCOUNTER
Patients daughter called with questions about Kika's upcoming surgery. I ensured that the patients included her as her contact that can discuss information. I left a voicemail for the daughter to call me back.

## 2021-03-22 NOTE — TELEPHONE ENCOUNTER
patients daughter called back and questions regarding drains and the length of surgery. All questions were answered. She verbalized understanding. I informed her to call back with any additional questions.

## 2021-03-23 ENCOUNTER — LAB ENCOUNTER (OUTPATIENT)
Dept: LAB | Facility: HOSPITAL | Age: 56
End: 2021-03-23
Attending: SURGERY
Payer: COMMERCIAL

## 2021-03-23 DIAGNOSIS — Z01.818 PREOP TESTING: ICD-10-CM

## 2021-03-23 LAB — SARS-COV-2 RNA RESP QL NAA+PROBE: NOT DETECTED

## 2021-03-24 NOTE — IMAGING NOTE
Continue Lid scrubs , Gel drops, Cequa or Restasis TID is ok when caretaker isn't there), Moxi TID, Vitamin C 1000 mg, Valtrex 1 gram TID for 10 days, then 1 gram daily, Medrol Dospak.  Artificial tears throughout the day. Discussed recommended breast biopsy with patient. Patient is being recommended for MRI biopsy of the left  Breast  by Dr. Sam Kitchen. Hx BRCA 2 GENE . SHE WORKS AS A . Pt history discussed as below:  Pt history of biopsy: YES?         Fa heterogeneous non mass enhancement in the posterior left breast upper outer quadrant is suspicious. MR guided biopsy is recommended. 2. Incidental 0.7 centimeter T2 hyperintense mass in the left hepatic lobe.   Correlation with CT scan of 6/18/2020 is YOUR KIDNEY FUNCTION WILL BE CHECK IF NOT DONE PREVIOUSLY. ENCOURAGED PATIENT TO KEEP WELL HYDRATED 2-3 DAYS BEFORE THE BIOPSY TO AID WITH  PROMOTING ADEQUATE KIDNEY FUNCTION WHILE RECEIVING CONTRAST.  You will receive contrast to assist locating the area t secretaries will be calling them to schedule their biopsy. iscussed recommended breast biopsy with patient.

## 2021-03-25 ENCOUNTER — HOSPITAL ENCOUNTER (OUTPATIENT)
Facility: HOSPITAL | Age: 56
Setting detail: HOSPITAL OUTPATIENT SURGERY
Discharge: HOME | End: 2021-03-25
Attending: SURGERY | Admitting: SURGERY
Payer: COMMERCIAL

## 2021-03-25 ENCOUNTER — ANESTHESIA EVENT (OUTPATIENT)
Dept: SURGERY | Facility: HOSPITAL | Age: 56
End: 2021-03-25
Payer: COMMERCIAL

## 2021-03-25 ENCOUNTER — ANESTHESIA (OUTPATIENT)
Dept: SURGERY | Facility: HOSPITAL | Age: 56
End: 2021-03-25
Payer: COMMERCIAL

## 2021-03-25 VITALS
TEMPERATURE: 98 F | HEART RATE: 69 BPM | SYSTOLIC BLOOD PRESSURE: 111 MMHG | OXYGEN SATURATION: 95 % | BODY MASS INDEX: 26.2 KG/M2 | RESPIRATION RATE: 19 BRPM | HEIGHT: 66 IN | DIASTOLIC BLOOD PRESSURE: 73 MMHG | WEIGHT: 163 LBS

## 2021-03-25 DIAGNOSIS — Z90.13 ABSENCE OF BREAST, BILATERAL: ICD-10-CM

## 2021-03-25 DIAGNOSIS — Z01.818 PREOP TESTING: Primary | ICD-10-CM

## 2021-03-25 PROCEDURE — 88300 SURGICAL PATH GROSS: CPT | Performed by: SURGERY

## 2021-03-25 PROCEDURE — 88305 TISSUE EXAM BY PATHOLOGIST: CPT | Performed by: SURGERY

## 2021-03-25 PROCEDURE — 0HNV0ZZ RELEASE BILATERAL BREAST, OPEN APPROACH: ICD-10-PCS | Performed by: SURGERY

## 2021-03-25 PROCEDURE — 0HRV0JZ REPLACEMENT OF BILATERAL BREAST WITH SYNTHETIC SUBSTITUTE, OPEN APPROACH: ICD-10-PCS | Performed by: SURGERY

## 2021-03-25 PROCEDURE — 0HRV37Z REPLACEMENT OF BILATERAL BREAST WITH AUTOLOGOUS TISSUE SUBSTITUTE, PERCUTANEOUS APPROACH: ICD-10-PCS | Performed by: SURGERY

## 2021-03-25 PROCEDURE — 0HPT0NZ REMOVAL OF TISSUE EXPANDER FROM RIGHT BREAST, OPEN APPROACH: ICD-10-PCS | Performed by: SURGERY

## 2021-03-25 PROCEDURE — 0HPU0NZ REMOVAL OF TISSUE EXPANDER FROM LEFT BREAST, OPEN APPROACH: ICD-10-PCS | Performed by: SURGERY

## 2021-03-25 RX ORDER — SODIUM CHLORIDE, SODIUM LACTATE, POTASSIUM CHLORIDE, CALCIUM CHLORIDE 600; 310; 30; 20 MG/100ML; MG/100ML; MG/100ML; MG/100ML
INJECTION, SOLUTION INTRAVENOUS CONTINUOUS
Status: DISCONTINUED | OUTPATIENT
Start: 2021-03-25 | End: 2021-03-25

## 2021-03-25 RX ORDER — CEFAZOLIN SODIUM/WATER 2 G/20 ML
2 SYRINGE (ML) INTRAVENOUS ONCE
Status: COMPLETED | OUTPATIENT
Start: 2021-03-25 | End: 2021-03-25

## 2021-03-25 RX ORDER — GLYCOPYRROLATE 0.2 MG/ML
INJECTION, SOLUTION INTRAMUSCULAR; INTRAVENOUS AS NEEDED
Status: DISCONTINUED | OUTPATIENT
Start: 2021-03-25 | End: 2021-03-25 | Stop reason: SURG

## 2021-03-25 RX ORDER — HYDROCODONE BITARTRATE AND ACETAMINOPHEN 5; 325 MG/1; MG/1
1-2 TABLET ORAL EVERY 4 HOURS PRN
Qty: 40 TABLET | Refills: 0 | Status: SHIPPED | OUTPATIENT
Start: 2021-03-25 | End: 2021-04-02 | Stop reason: ALTCHOICE

## 2021-03-25 RX ORDER — DEXAMETHASONE SODIUM PHOSPHATE 4 MG/ML
VIAL (ML) INJECTION AS NEEDED
Status: DISCONTINUED | OUTPATIENT
Start: 2021-03-25 | End: 2021-03-25 | Stop reason: SURG

## 2021-03-25 RX ORDER — LIDOCAINE HYDROCHLORIDE AND EPINEPHRINE 10; 10 MG/ML; UG/ML
INJECTION, SOLUTION INFILTRATION; PERINEURAL AS NEEDED
Status: DISCONTINUED | OUTPATIENT
Start: 2021-03-25 | End: 2021-03-25 | Stop reason: HOSPADM

## 2021-03-25 RX ORDER — HALOPERIDOL 5 MG/ML
0.25 INJECTION INTRAMUSCULAR ONCE AS NEEDED
Status: DISCONTINUED | OUTPATIENT
Start: 2021-03-25 | End: 2021-03-25

## 2021-03-25 RX ORDER — ONDANSETRON 2 MG/ML
INJECTION INTRAMUSCULAR; INTRAVENOUS AS NEEDED
Status: DISCONTINUED | OUTPATIENT
Start: 2021-03-25 | End: 2021-03-25 | Stop reason: SURG

## 2021-03-25 RX ORDER — NEOSTIGMINE METHYLSULFATE 1 MG/ML
INJECTION INTRAVENOUS AS NEEDED
Status: DISCONTINUED | OUTPATIENT
Start: 2021-03-25 | End: 2021-03-25 | Stop reason: SURG

## 2021-03-25 RX ORDER — MORPHINE SULFATE 4 MG/ML
2 INJECTION, SOLUTION INTRAMUSCULAR; INTRAVENOUS EVERY 10 MIN PRN
Status: DISCONTINUED | OUTPATIENT
Start: 2021-03-25 | End: 2021-03-25

## 2021-03-25 RX ORDER — ROCURONIUM BROMIDE 10 MG/ML
INJECTION, SOLUTION INTRAVENOUS AS NEEDED
Status: DISCONTINUED | OUTPATIENT
Start: 2021-03-25 | End: 2021-03-25 | Stop reason: SURG

## 2021-03-25 RX ORDER — MORPHINE SULFATE 4 MG/ML
4 INJECTION, SOLUTION INTRAMUSCULAR; INTRAVENOUS EVERY 10 MIN PRN
Status: DISCONTINUED | OUTPATIENT
Start: 2021-03-25 | End: 2021-03-25

## 2021-03-25 RX ORDER — ACETAMINOPHEN 650 MG
TABLET, EXTENDED RELEASE ORAL AS NEEDED
Status: DISCONTINUED | OUTPATIENT
Start: 2021-03-25 | End: 2021-03-25 | Stop reason: HOSPADM

## 2021-03-25 RX ORDER — ONDANSETRON 4 MG/1
4 TABLET, FILM COATED ORAL EVERY 8 HOURS PRN
Qty: 30 TABLET | Refills: 0 | Status: SHIPPED | OUTPATIENT
Start: 2021-03-25 | End: 2021-04-02 | Stop reason: ALTCHOICE

## 2021-03-25 RX ORDER — HYDROCODONE BITARTRATE AND ACETAMINOPHEN 5; 325 MG/1; MG/1
1 TABLET ORAL AS NEEDED
Status: COMPLETED | OUTPATIENT
Start: 2021-03-25 | End: 2021-03-25

## 2021-03-25 RX ORDER — MORPHINE SULFATE 10 MG/ML
6 INJECTION, SOLUTION INTRAMUSCULAR; INTRAVENOUS EVERY 10 MIN PRN
Status: DISCONTINUED | OUTPATIENT
Start: 2021-03-25 | End: 2021-03-25

## 2021-03-25 RX ORDER — ONDANSETRON 2 MG/ML
4 INJECTION INTRAMUSCULAR; INTRAVENOUS ONCE AS NEEDED
Status: COMPLETED | OUTPATIENT
Start: 2021-03-25 | End: 2021-03-25

## 2021-03-25 RX ORDER — ACETAMINOPHEN 500 MG
1000 TABLET ORAL ONCE
Status: COMPLETED | OUTPATIENT
Start: 2021-03-25 | End: 2021-03-25

## 2021-03-25 RX ORDER — HYDROCODONE BITARTRATE AND ACETAMINOPHEN 5; 325 MG/1; MG/1
2 TABLET ORAL AS NEEDED
Status: COMPLETED | OUTPATIENT
Start: 2021-03-25 | End: 2021-03-25

## 2021-03-25 RX ORDER — DOCUSATE SODIUM 100 MG/1
100 CAPSULE, LIQUID FILLED ORAL 2 TIMES DAILY
Qty: 40 CAPSULE | Refills: 0 | Status: SHIPPED | OUTPATIENT
Start: 2021-03-25 | End: 2021-04-02 | Stop reason: ALTCHOICE

## 2021-03-25 RX ORDER — PROCHLORPERAZINE EDISYLATE 5 MG/ML
5 INJECTION INTRAMUSCULAR; INTRAVENOUS ONCE AS NEEDED
Status: DISCONTINUED | OUTPATIENT
Start: 2021-03-25 | End: 2021-03-25

## 2021-03-25 RX ORDER — HYDROMORPHONE HYDROCHLORIDE 1 MG/ML
0.6 INJECTION, SOLUTION INTRAMUSCULAR; INTRAVENOUS; SUBCUTANEOUS EVERY 5 MIN PRN
Status: DISCONTINUED | OUTPATIENT
Start: 2021-03-25 | End: 2021-03-25

## 2021-03-25 RX ORDER — NALOXONE HYDROCHLORIDE 0.4 MG/ML
80 INJECTION, SOLUTION INTRAMUSCULAR; INTRAVENOUS; SUBCUTANEOUS AS NEEDED
Status: DISCONTINUED | OUTPATIENT
Start: 2021-03-25 | End: 2021-03-25

## 2021-03-25 RX ORDER — HYDROMORPHONE HYDROCHLORIDE 1 MG/ML
0.2 INJECTION, SOLUTION INTRAMUSCULAR; INTRAVENOUS; SUBCUTANEOUS EVERY 5 MIN PRN
Status: DISCONTINUED | OUTPATIENT
Start: 2021-03-25 | End: 2021-03-25

## 2021-03-25 RX ORDER — CEPHALEXIN 500 MG/1
500 CAPSULE ORAL 4 TIMES DAILY
Qty: 20 CAPSULE | Refills: 0 | Status: SHIPPED | OUTPATIENT
Start: 2021-03-25 | End: 2021-03-30

## 2021-03-25 RX ORDER — HYDROMORPHONE HYDROCHLORIDE 1 MG/ML
0.4 INJECTION, SOLUTION INTRAMUSCULAR; INTRAVENOUS; SUBCUTANEOUS EVERY 5 MIN PRN
Status: DISCONTINUED | OUTPATIENT
Start: 2021-03-25 | End: 2021-03-25

## 2021-03-25 RX ORDER — PHENYLEPHRINE HCL 10 MG/ML
VIAL (ML) INJECTION AS NEEDED
Status: DISCONTINUED | OUTPATIENT
Start: 2021-03-25 | End: 2021-03-25 | Stop reason: SURG

## 2021-03-25 RX ADMIN — DEXAMETHASONE SODIUM PHOSPHATE 4 MG: 4 MG/ML VIAL (ML) INJECTION at 11:34:00

## 2021-03-25 RX ADMIN — ONDANSETRON 4 MG: 2 INJECTION INTRAMUSCULAR; INTRAVENOUS at 11:34:00

## 2021-03-25 RX ADMIN — NEOSTIGMINE METHYLSULFATE 5 MG: 1 INJECTION INTRAVENOUS at 14:08:00

## 2021-03-25 RX ADMIN — CEFAZOLIN SODIUM/WATER 2 G: 2 G/20 ML SYRINGE (ML) INTRAVENOUS at 11:45:00

## 2021-03-25 RX ADMIN — PHENYLEPHRINE HCL 100 MCG: 10 MG/ML VIAL (ML) INJECTION at 11:40:00

## 2021-03-25 RX ADMIN — GLYCOPYRROLATE 0.6 MG: 0.2 INJECTION, SOLUTION INTRAMUSCULAR; INTRAVENOUS at 14:08:00

## 2021-03-25 RX ADMIN — PHENYLEPHRINE HCL 100 MCG: 10 MG/ML VIAL (ML) INJECTION at 12:20:00

## 2021-03-25 RX ADMIN — SODIUM CHLORIDE, SODIUM LACTATE, POTASSIUM CHLORIDE, CALCIUM CHLORIDE: 600; 310; 30; 20 INJECTION, SOLUTION INTRAVENOUS at 14:24:00

## 2021-03-25 RX ADMIN — ROCURONIUM BROMIDE 50 MG: 10 INJECTION, SOLUTION INTRAVENOUS at 11:34:00

## 2021-03-25 NOTE — ANESTHESIA PREPROCEDURE EVALUATION
Anesthesia PreOp Note    HPI:     Phan Dixon is a 60year old female who presents for preoperative consultation requested by: Enmanuel Jordan MD    Date of Surgery: 3/25/2021    Procedure(s):  Removal of bilateral tissue expanders and placement of perma November 16th, 2020   • NEEDLE BIOPSY LEFT Left 05/2018    benign   • NEEDLE BIOPSY RIGHT Right 05/2018    benign   • OTHER N/A 2000    vocal chord surgery- extra/benign tissue       No medications prior to admission.     lactated ringers infusion, , In Helmet: Not Asked        Seat Belt: Not Asked        Self-Exams: Not Asked    Social History Narrative      No H/O abuse     Social Determinants of Health  Financial Resource Strain:       Difficulty of Paying Living Expenses:   Food Insecurity:       Hilda Alfred kg (163 lb)    Height: 1.676 m (5' 6\") 1.676 m (5' 6\")         Anesthesia Evaluation     Patient summary reviewed and Nursing notes reviewed    No history of anesthetic complications   Airway   Mallampati: II  TM distance: >3 FB  Neck ROM: full  Dental -

## 2021-03-25 NOTE — BRIEF OP NOTE
Pre-Operative Diagnosis: Absence of breast, bilateral [Z90.13]     Post-Operative Diagnosis: Absence of breast, bilateral [Z90.13]      Procedure Performed:    Removal of bilateral tissue expanders and placement of permanent silicone implants.   Autologous

## 2021-03-25 NOTE — ANESTHESIA POSTPROCEDURE EVALUATION
Patient: Phan Dixon    Procedure Summary     Date: 03/25/21 Room / Location: [facility] OR 02 / [facility] OR    Anesthesia Start: 1128 Anesthesia Stop: 1951    Procedure: Removal of bilateral tissue expanders and placement of permanent silicone implants.

## 2021-03-25 NOTE — ANESTHESIA PROCEDURE NOTES
Airway  Date/Time: 3/25/2021 11:45 AM  Urgency: Elective    Airway not difficult    General Information and Staff    Patient location during procedure: OR  Anesthesiologist: Kraig Carver MD  Resident/CRNA: Alexandr Palacios CRNA  Performed: CRNA

## 2021-03-25 NOTE — H&P
MARE Hercules's 3/1/21 surgical clearance H&P reviewed. No interval changes. Informed consent obtained and she wishes to proceed as planned.

## 2021-03-25 NOTE — OPERATIVE REPORT
[facility]    PATIENT'S NAME: PHILIP BROWN CHEYANNE   ATTENDING PHYSICIAN: Joey Barr MD   OPERATING PHYSICIAN: Joey Barr MD   PATIENT ACCOUNT#:   [id number]    LOCATION:  [facility] 4 [facility] 10  MEDICAL RECORD #:   [id number]       DATE OF properly identified, placed in the supine position. Sequential compression devices were placed on bilateral lower extremities. Intravenous antibiotic prophylaxis was administered.   The patient then underwent successful induction of general anesthesia and irrigation until clear. Hemostasis was checked and noted to be adequate. The surgical sites were isolated with Ioban and gloves were changed. The implants were then brought on the field and immediately bathed in antibiotic irrigation.   They were checked

## 2021-04-02 ENCOUNTER — OFFICE VISIT (OUTPATIENT)
Dept: SURGERY | Facility: CLINIC | Age: 56
End: 2021-04-02
Payer: COMMERCIAL

## 2021-04-02 DIAGNOSIS — Z90.13 ABSENCE OF BREAST, BILATERAL: Primary | ICD-10-CM

## 2021-04-02 PROCEDURE — 99024 POSTOP FOLLOW-UP VISIT: CPT | Performed by: PHYSICIAN ASSISTANT

## 2021-04-16 ENCOUNTER — OFFICE VISIT (OUTPATIENT)
Dept: SURGERY | Facility: CLINIC | Age: 56
End: 2021-04-16
Payer: COMMERCIAL

## 2021-04-16 DIAGNOSIS — Z15.01 BRCA2 POSITIVE: ICD-10-CM

## 2021-04-16 DIAGNOSIS — Z15.09 BRCA2 POSITIVE: ICD-10-CM

## 2021-04-16 DIAGNOSIS — Z90.13 ABSENCE OF BREAST, BILATERAL: Primary | ICD-10-CM

## 2021-04-16 DIAGNOSIS — D05.12 DUCTAL CARCINOMA IN SITU (DCIS) OF LEFT BREAST: ICD-10-CM

## 2021-04-16 PROCEDURE — 99024 POSTOP FOLLOW-UP VISIT: CPT | Performed by: SURGERY

## 2021-04-16 NOTE — PROGRESS NOTES
Eleuterio Romero is a 60year old female who presents today for a follow-up with her daughter. She is extremely pleased with the result. Physical Examination:  Breasts: Bilateral breast incisions are clean dry and intact.   Good shape and symmetry is note

## 2021-04-20 ENCOUNTER — TELEPHONE (OUTPATIENT)
Dept: SURGERY | Facility: CLINIC | Age: 56
End: 2021-04-20

## 2021-04-20 NOTE — TELEPHONE ENCOUNTER
Called patient after receiving a voicemail requesting a letter to return to work, I informed her the letter was sent to her [address] She is to return to work Monday 4/26/21 with no restrictions. Patient was appreciative of the call.

## 2021-04-21 ENCOUNTER — TELEPHONE (OUTPATIENT)
Dept: OBGYN CLINIC | Facility: CLINIC | Age: 56
End: 2021-04-21

## 2021-04-21 ENCOUNTER — TELEPHONE (OUTPATIENT)
Dept: GASTROENTEROLOGY | Facility: CLINIC | Age: 56
End: 2021-04-21

## 2021-04-21 ENCOUNTER — OFFICE VISIT (OUTPATIENT)
Dept: OBGYN CLINIC | Facility: CLINIC | Age: 56
End: 2021-04-21
Payer: COMMERCIAL

## 2021-04-21 VITALS
BODY MASS INDEX: 27.99 KG/M2 | HEIGHT: 65 IN | WEIGHT: 168 LBS | DIASTOLIC BLOOD PRESSURE: 56 MMHG | SYSTOLIC BLOOD PRESSURE: 122 MMHG

## 2021-04-21 DIAGNOSIS — Z86.010 HISTORY OF COLON POLYPS: Primary | ICD-10-CM

## 2021-04-21 DIAGNOSIS — R87.610 ASCUS OF CERVIX WITH NEGATIVE HIGH RISK HPV: Primary | ICD-10-CM

## 2021-04-21 DIAGNOSIS — D05.10 DUCTAL CARCINOMA IN SITU (DCIS) OF BREAST, UNSPECIFIED LATERALITY: ICD-10-CM

## 2021-04-21 PROCEDURE — 99396 PREV VISIT EST AGE 40-64: CPT | Performed by: OBSTETRICS & GYNECOLOGY

## 2021-04-21 PROCEDURE — 3074F SYST BP LT 130 MM HG: CPT | Performed by: OBSTETRICS & GYNECOLOGY

## 2021-04-21 PROCEDURE — 87624 HPV HI-RISK TYP POOLED RSLT: CPT | Performed by: OBSTETRICS & GYNECOLOGY

## 2021-04-21 PROCEDURE — 3078F DIAST BP <80 MM HG: CPT | Performed by: OBSTETRICS & GYNECOLOGY

## 2021-04-21 PROCEDURE — 3008F BODY MASS INDEX DOCD: CPT | Performed by: OBSTETRICS & GYNECOLOGY

## 2021-04-21 NOTE — TELEPHONE ENCOUNTER
Pt states she came in for her Office visit with Dr. Alejandra Alcala and in her Visit Summary her weight is recorded at 188 lbs. The pt states the correct weight is approximately 168 lbs. . The pt would just like to clarify that that is incorrect and would not want

## 2021-04-21 NOTE — TELEPHONE ENCOUNTER
The patient's chart has been reviewed. Okay to schedule pt for 5 year CLN recall r/t hx colon polyps with Dr. Lulu Valencia. Advise MAC sedation r/t BMI with split dose MiraLAX/Gatorade (related to national bowel prep shortage.   Patient has no history of const

## 2021-04-21 NOTE — PROGRESS NOTES
Anne Thomason is here for a checkup. She has no gynecologic complaints. Patient has had bilateral mastectomies and reconstruction. She has not had any vaginal spotting or bleeding. Last Pap smear had shown ASCUS with positive HPV.     On examination:  Shayla Cancer Sister 37       Social History     Social History    Socioeconomic History      Marital status:       Spouse name: Not on file      Number of children: 4      Years of education: Not on file      Highest education level: Not on file    Occup Social Gatherings with Friends and Family:       Attends Cheondo[employer] Services:       Active Member of Clubs or Organizations:       Attends Club or Organization Meetings:       Marital Status:   Intimate Partner Violence:       Fear of Current or Ex-Partner: laterality    - Carolina Jaimes MD  12:26 PM  4/21/2021

## 2021-04-21 NOTE — TELEPHONE ENCOUNTER
Colonoscopy from 3/2015- 3 small polyps removed (hyperplastic and adenoma noted), diverticulosis, fair prep  Place on colon call back for 5 years            Electronically signed by Anaya Edge MD at 12/23/2015  9:11 AM

## 2021-04-21 NOTE — TELEPHONE ENCOUNTER
This RN corrected pt's weight and msg routed to [address] relayed to pt. Pt verbalized understanding and agrees with POC.

## 2021-04-21 NOTE — TELEPHONE ENCOUNTER
Last Procedure, Date, MD:  [facility] colonoscopy 3/30/2015  Last Diagnosis:  Cecal polyp, ascending polyp x2, diverticulosis  Recalled for (mth/yrs): 5 years  Sedation used previously:    Last Prep Used (if known):  n/a  Quality of prep (if known): fair  Ant

## 2021-04-26 ENCOUNTER — TELEPHONE (OUTPATIENT)
Dept: SURGERY | Facility: CLINIC | Age: 56
End: 2021-04-26

## 2021-04-27 ENCOUNTER — TELEPHONE (OUTPATIENT)
Dept: FAMILY MEDICINE CLINIC | Facility: CLINIC | Age: 56
End: 2021-04-27

## 2021-04-27 ENCOUNTER — TELEPHONE (OUTPATIENT)
Dept: OBGYN CLINIC | Facility: CLINIC | Age: 56
End: 2021-04-27

## 2021-04-27 RX ORDER — METRONIDAZOLE 500 MG/1
500 TABLET ORAL 2 TIMES DAILY
Qty: 14 TABLET | Refills: 0 | Status: SHIPPED | OUTPATIENT
Start: 2021-04-27 | End: 2021-07-21

## 2021-04-27 NOTE — TELEPHONE ENCOUNTER
Pt calling back. Pt has had vag discharge and \"smell\". This RN sent Rx for Flagyl to pharmacy of choice and ETOH precaution given to pt. Per [facility], ok to treat. Pt verbalized understanding and agrees with POC.

## 2021-04-27 NOTE — TELEPHONE ENCOUNTER
Patient reports gyne did a pap smear for patient and she believes her result showed positive for BV, advised to contact gyne to confirm this result and to request treatment if needed, patient agreed with plan.

## 2021-04-27 NOTE — TELEPHONE ENCOUNTER
Left VM for pt that we don't treat (for BV) unless pt is symptomatic. Pt to call back if she has symptoms.       Result Notes     1 Patient Communication  Component   Ref Range & Units    Interpretation/Result   Abnormal   Negative for intraepithelial lesio

## 2021-04-27 NOTE — TELEPHONE ENCOUNTER
PT saw her PAP results and wonders if she can get medication for the Bacterial Vaginitis mentioned in the results  Please requested a detailed message if she doesn't answer her phone.

## 2021-05-01 NOTE — TELEPHONE ENCOUNTER
Scheduled for:  Colonoscopy 67624  Provider Name:  Dr. Gifty Mathis  Date:  5/29/21  Location:    [facility]  Sedation:  MAC  Time:  0930 (pt is aware to arrive at 0830)   Prep:  Miralax/Gatorade, sent via FlowJob/Allergies Reconciled?:  Physician reviewed   Diagn

## 2021-05-26 ENCOUNTER — LAB ENCOUNTER (OUTPATIENT)
Dept: LAB | Facility: HOSPITAL | Age: 56
End: 2021-05-26
Attending: INTERNAL MEDICINE
Payer: COMMERCIAL

## 2021-05-26 DIAGNOSIS — Z01.818 PRE-OP TESTING: ICD-10-CM

## 2021-05-29 ENCOUNTER — ANESTHESIA EVENT (OUTPATIENT)
Dept: ENDOSCOPY | Facility: HOSPITAL | Age: 56
End: 2021-05-29
Payer: COMMERCIAL

## 2021-05-29 ENCOUNTER — HOSPITAL ENCOUNTER (OUTPATIENT)
Facility: HOSPITAL | Age: 56
Setting detail: HOSPITAL OUTPATIENT SURGERY
Discharge: HOME | End: 2021-05-29
Attending: INTERNAL MEDICINE | Admitting: INTERNAL MEDICINE
Payer: COMMERCIAL

## 2021-05-29 ENCOUNTER — ANESTHESIA (OUTPATIENT)
Dept: ENDOSCOPY | Facility: HOSPITAL | Age: 56
End: 2021-05-29
Payer: COMMERCIAL

## 2021-05-29 VITALS
SYSTOLIC BLOOD PRESSURE: 112 MMHG | OXYGEN SATURATION: 99 % | TEMPERATURE: 98 F | WEIGHT: 156 LBS | RESPIRATION RATE: 15 BRPM | DIASTOLIC BLOOD PRESSURE: 73 MMHG | BODY MASS INDEX: 25.07 KG/M2 | HEART RATE: 59 BPM | HEIGHT: 66 IN

## 2021-05-29 DIAGNOSIS — Z86.010 HISTORY OF COLON POLYPS: ICD-10-CM

## 2021-05-29 DIAGNOSIS — Z01.818 PRE-OP TESTING: Primary | ICD-10-CM

## 2021-05-29 PROCEDURE — 45385 COLONOSCOPY W/LESION REMOVAL: CPT | Performed by: INTERNAL MEDICINE

## 2021-05-29 PROCEDURE — 0DBP8ZX EXCISION OF RECTUM, VIA NATURAL OR ARTIFICIAL OPENING ENDOSCOPIC, DIAGNOSTIC: ICD-10-PCS | Performed by: INTERNAL MEDICINE

## 2021-05-29 PROCEDURE — 0DBL8ZX EXCISION OF TRANSVERSE COLON, VIA NATURAL OR ARTIFICIAL OPENING ENDOSCOPIC, DIAGNOSTIC: ICD-10-PCS | Performed by: INTERNAL MEDICINE

## 2021-05-29 PROCEDURE — 45380 COLONOSCOPY AND BIOPSY: CPT | Performed by: INTERNAL MEDICINE

## 2021-05-29 RX ORDER — EPHEDRINE SULFATE 50 MG/ML
INJECTION INTRAVENOUS AS NEEDED
Status: DISCONTINUED | OUTPATIENT
Start: 2021-05-29 | End: 2021-05-29 | Stop reason: SURG

## 2021-05-29 RX ORDER — LIDOCAINE HYDROCHLORIDE 10 MG/ML
INJECTION, SOLUTION EPIDURAL; INFILTRATION; INTRACAUDAL; PERINEURAL AS NEEDED
Status: DISCONTINUED | OUTPATIENT
Start: 2021-05-29 | End: 2021-05-29 | Stop reason: SURG

## 2021-05-29 RX ORDER — ONDANSETRON 2 MG/ML
INJECTION INTRAMUSCULAR; INTRAVENOUS AS NEEDED
Status: DISCONTINUED | OUTPATIENT
Start: 2021-05-29 | End: 2021-05-29 | Stop reason: SURG

## 2021-05-29 RX ORDER — SODIUM CHLORIDE, SODIUM LACTATE, POTASSIUM CHLORIDE, CALCIUM CHLORIDE 600; 310; 30; 20 MG/100ML; MG/100ML; MG/100ML; MG/100ML
INJECTION, SOLUTION INTRAVENOUS CONTINUOUS
Status: DISCONTINUED | OUTPATIENT
Start: 2021-05-29 | End: 2021-05-29

## 2021-05-29 RX ADMIN — EPHEDRINE SULFATE 5 MG: 50 INJECTION INTRAVENOUS at 09:55:00

## 2021-05-29 RX ADMIN — EPHEDRINE SULFATE 5 MG: 50 INJECTION INTRAVENOUS at 09:46:00

## 2021-05-29 RX ADMIN — ONDANSETRON 4 MG: 2 INJECTION INTRAMUSCULAR; INTRAVENOUS at 09:29:00

## 2021-05-29 RX ADMIN — LIDOCAINE HYDROCHLORIDE 25 MG: 10 INJECTION, SOLUTION EPIDURAL; INFILTRATION; INTRACAUDAL; PERINEURAL at 09:27:00

## 2021-05-29 NOTE — H&P
History & Physical Examination    Patient Name: Nidia Galarza  MRN: [id number]  CSN: [id number]  YOB: 1965    Diagnosis:    History of colon polyps  History of diverticulitis    No medications prior to admission.     lactated ringers infusion

## 2021-05-29 NOTE — ANESTHESIA POSTPROCEDURE EVALUATION
Patient: Javid Cross    Procedure Summary     Date: 05/29/21 Room / Location: [address] [place] [address] ENDOSCOPY 01 / [facility] ENDOSCOPY    Anesthesia Start: 5600 Anesthesia Stop: 0875    Procedure: COLONOSCOPY (N/A ) Diagnosis:       History of colon polyps      (Polyps, divert

## 2021-05-29 NOTE — ANESTHESIA PREPROCEDURE EVALUATION
Anesthesia PreOp Note    HPI:     Deidra Awad is a 60year old female who presents for preoperative consultation requested by: Russ Mckoy MD    Date of Surgery: 5/29/2021    Procedure(s):  COLONOSCOPY  Indication: History of colon polyps    Relev name: Not on file      Number of children: 4      Years of education: Not on file      Highest education level: Not on file    Occupational History      Occupation: teacher    Tobacco Use      Smoking status: Former Smoker        Types: Cigarettes      Smo     Attends Club or Organization Meetings:       Marital Status:   Intimate Partner Violence:       Fear of Current or Ex-Partner:       Emotionally Abused:       Physically Abused:       Sexually Abused:     Available pre-op labs reviewed.   Lab Results patient desires the anesthetic management as planned.   [facility]  5/29/2021 8:44 AM

## 2021-05-29 NOTE — OPERATIVE REPORT
[facility] HOSP - [facility] Endoscopy Report      Preoperative Diagnosis:  - history of colon polyps       Postoperative Diagnosis:  - colon polyps x 2  - diverticulosis  - internal hemorrhoids      Procedure:    Colonoscopy       Surgeon:  Wilma Robles

## 2021-06-01 ENCOUNTER — TELEPHONE (OUTPATIENT)
Dept: GASTROENTEROLOGY | Facility: CLINIC | Age: 56
End: 2021-06-01

## 2021-06-01 NOTE — TELEPHONE ENCOUNTER
LMTCB. Please transfer to triage. Health maintenance updated. 7 year colonoscopy recall placed in patient outreach. Next due on 05/29/2028 per Dr. Nam Apple.

## 2021-06-01 NOTE — TELEPHONE ENCOUNTER
Stephie Mason MD  [facility]  I wanted to get back to you with your colonoscopy results.  You had 2 colon polyps removed which were benign.  I would advise a repeat colonoscopy in 7 years to make sure no new polyps are forming.       You al

## 2021-06-01 NOTE — TELEPHONE ENCOUNTER
Patient called back,  verified and message from Dr. Julian Burton given. Patient voiced understanding.

## 2021-06-07 NOTE — PROGRESS NOTES
Breast Surgery Surveillance Visit    Diagnosis: DCIS, left breast; status post bilateral skin sparing mastectomy with L SLNB and immediate reconstruction with expander placement on 11/16/20.  Removal of bilateral tissue expanders with placement of silicone • COLONOSCOPY N/A 2014    @ [facility]   • COLONOSCOPY     • COLONOSCOPY N/A 5/29/2021    Procedure: COLONOSCOPY;  Surgeon: Concepcion Garcia MD;  Location: [address] [place] [address] ENDOSCOPY   • EGD N/A    • MASTECTOMY LEFT      November 16th, 2020   • MASTECTOMY RIGHT walking.     Breasts:  See history of present illness     Gastrointestinal:     There is no history of difficulty or pain with swallowing, reflux symptoms, vomiting, dark or bloody stools, constipation, yellowing of the skin, indigestion, nausea, change in and movements are normal. Her affect is appropriate.     HEENT: The head is normocephalic. The neck is supple. The thyroid is not enlarged and is without palpable masses/nodules. There are no palpable masses. The trachea is in the midline.  Conjunctiva are encouraged her to continue monitoring her ROM and strength and explained that a referral to physical therapy is warranted secondary to her restrictions. She is to see me in 6 months for an ongoing clinical evaluation.   She was encouraged to contact the of

## 2021-06-09 ENCOUNTER — OFFICE VISIT (OUTPATIENT)
Dept: SURGERY | Facility: CLINIC | Age: 56
End: 2021-06-09
Payer: COMMERCIAL

## 2021-06-09 VITALS
HEIGHT: 66 IN | WEIGHT: 161 LBS | SYSTOLIC BLOOD PRESSURE: 135 MMHG | RESPIRATION RATE: 18 BRPM | HEART RATE: 93 BPM | OXYGEN SATURATION: 100 % | DIASTOLIC BLOOD PRESSURE: 69 MMHG | BODY MASS INDEX: 25.88 KG/M2

## 2021-06-09 DIAGNOSIS — D05.12 DUCTAL CARCINOMA IN SITU (DCIS) OF LEFT BREAST: ICD-10-CM

## 2021-06-09 DIAGNOSIS — Z15.09 BRCA2 POSITIVE: ICD-10-CM

## 2021-06-09 DIAGNOSIS — L03.313 CELLULITIS OF CHEST WALL: Primary | ICD-10-CM

## 2021-06-09 DIAGNOSIS — Z15.01 BRCA2 POSITIVE: ICD-10-CM

## 2021-06-09 PROCEDURE — 3075F SYST BP GE 130 - 139MM HG: CPT | Performed by: SURGERY

## 2021-06-09 PROCEDURE — 99214 OFFICE O/P EST MOD 30 MIN: CPT | Performed by: SURGERY

## 2021-06-09 PROCEDURE — 3078F DIAST BP <80 MM HG: CPT | Performed by: SURGERY

## 2021-06-09 PROCEDURE — 3008F BODY MASS INDEX DOCD: CPT | Performed by: SURGERY

## 2021-06-09 RX ORDER — CEFADROXIL 500 MG/1
500 CAPSULE ORAL 2 TIMES DAILY
Qty: 20 CAPSULE | Refills: 0 | Status: SHIPPED | OUTPATIENT
Start: 2021-06-09 | End: 2021-06-19

## 2021-06-11 ENCOUNTER — OFFICE VISIT (OUTPATIENT)
Dept: SURGERY | Facility: CLINIC | Age: 56
End: 2021-06-11
Payer: COMMERCIAL

## 2021-06-11 DIAGNOSIS — Z90.13 ABSENCE OF BREAST, BILATERAL: Primary | ICD-10-CM

## 2021-06-11 PROCEDURE — 99024 POSTOP FOLLOW-UP VISIT: CPT | Performed by: SURGERY

## 2021-06-11 NOTE — PROGRESS NOTES
Krzysztof Arizmendi is a 60year old female who presents today for a follow-up. The patient was seen by Dr. Shirlene Lopez for routine follow-up 2 days ago. At that time she was noted to have a mild left breast cellulitis and was started on oral antibiotics.   She de

## 2021-06-15 ENCOUNTER — OFFICE VISIT (OUTPATIENT)
Dept: SURGERY | Facility: CLINIC | Age: 56
End: 2021-06-15
Payer: COMMERCIAL

## 2021-06-15 DIAGNOSIS — Z90.13 ABSENCE OF BREAST, BILATERAL: Primary | ICD-10-CM

## 2021-06-15 PROCEDURE — 99024 POSTOP FOLLOW-UP VISIT: CPT | Performed by: SURGERY

## 2021-06-15 NOTE — PROGRESS NOTES
Pat Mast is a 60year old female who presents today for a follow-up. She remains on oral antibiotics and denies fevers or chills. Physical Examination:  Breasts: The left breast erythema has resolved. There is no seroma noted.     Assessment a

## 2021-07-07 ENCOUNTER — TELEPHONE (OUTPATIENT)
Dept: FAMILY MEDICINE CLINIC | Facility: CLINIC | Age: 56
End: 2021-07-07

## 2021-07-07 RX ORDER — NITROFURANTOIN 25; 75 MG/1; MG/1
100 CAPSULE ORAL 2 TIMES DAILY
Qty: 14 CAPSULE | Refills: 0 | Status: SHIPPED | OUTPATIENT
Start: 2021-07-07 | End: 2021-09-16

## 2021-07-07 NOTE — TELEPHONE ENCOUNTER
Patient states since yesterday, having UTI symptoms of dysuria and frequency. \"I was up all last night with frequent urination. \"    Patient denies fever, hematuria, abdominal/flank/back pain.

## 2021-07-21 ENCOUNTER — TELEMEDICINE (OUTPATIENT)
Dept: FAMILY MEDICINE CLINIC | Facility: CLINIC | Age: 56
End: 2021-07-21

## 2021-07-21 DIAGNOSIS — N76.0 BACTERIAL VAGINOSIS: Primary | ICD-10-CM

## 2021-07-21 DIAGNOSIS — B96.89 BACTERIAL VAGINOSIS: Primary | ICD-10-CM

## 2021-07-21 PROCEDURE — 99213 OFFICE O/P EST LOW 20 MIN: CPT | Performed by: FAMILY MEDICINE

## 2021-07-21 RX ORDER — METRONIDAZOLE 500 MG/1
500 TABLET ORAL 2 TIMES DAILY
Qty: 14 TABLET | Refills: 0 | Status: SHIPPED | OUTPATIENT
Start: 2021-07-21 | End: 2021-07-28

## 2021-07-21 NOTE — PROGRESS NOTES
HPI/Subjective:   Patient ID: Phan Dixon is a 60year old female. This visit is conducted using Telemedicine with live, interactive video and audio during this Coronavirus pandemic.     Please note that the following visit was completed using two-way mg total) by mouth 2 (two) times daily. 14 capsule 0     Allergies:No Known Allergies    Objective:   Physical Exam  Constitutional:       Appearance: Normal appearance.    Pulmonary:      Effort: Pulmonary effort is normal.   Neurological:      Mental Stat

## 2021-09-15 ENCOUNTER — NURSE TRIAGE (OUTPATIENT)
Dept: FAMILY MEDICINE CLINIC | Facility: CLINIC | Age: 56
End: 2021-09-15

## 2021-09-15 NOTE — TELEPHONE ENCOUNTER
Action Requested: Summary for Provider     []  Critical Lab, Recommendations Needed  [] Need Additional Advice  []   FYI    []   Need Orders  [] Need Medications Sent to Pharmacy  []  Other     SUMMARY: Per protocol, patient should be seen in office today.

## 2021-09-16 ENCOUNTER — OFFICE VISIT (OUTPATIENT)
Dept: FAMILY MEDICINE CLINIC | Facility: CLINIC | Age: 56
End: 2021-09-16
Payer: COMMERCIAL

## 2021-09-16 VITALS
HEIGHT: 66 IN | TEMPERATURE: 97 F | RESPIRATION RATE: 18 BRPM | WEIGHT: 157 LBS | SYSTOLIC BLOOD PRESSURE: 109 MMHG | HEART RATE: 89 BPM | DIASTOLIC BLOOD PRESSURE: 71 MMHG | BODY MASS INDEX: 25.23 KG/M2

## 2021-09-16 DIAGNOSIS — M54.32 SCIATICA OF LEFT SIDE: ICD-10-CM

## 2021-09-16 DIAGNOSIS — M54.50 DORSALGIA OF LUMBAR REGION: Primary | ICD-10-CM

## 2021-09-16 PROCEDURE — 3008F BODY MASS INDEX DOCD: CPT | Performed by: FAMILY MEDICINE

## 2021-09-16 PROCEDURE — 99213 OFFICE O/P EST LOW 20 MIN: CPT | Performed by: FAMILY MEDICINE

## 2021-09-16 PROCEDURE — 3078F DIAST BP <80 MM HG: CPT | Performed by: FAMILY MEDICINE

## 2021-09-16 PROCEDURE — 3074F SYST BP LT 130 MM HG: CPT | Performed by: FAMILY MEDICINE

## 2021-09-16 RX ORDER — CYCLOBENZAPRINE HCL 10 MG
10 TABLET ORAL NIGHTLY
Qty: 30 TABLET | Refills: 0 | Status: SHIPPED | OUTPATIENT
Start: 2021-09-16 | End: 2021-09-27

## 2021-09-16 NOTE — PROGRESS NOTES
Subjective:   Patient ID: Ho Barraza is a 60year old female. Pt presents with hx of low back pain over the 5-6 months. Pt has been seeing chiropractor and doing PT and treating this. Pt states this feels worse.  Pt has had pain over the left lower

## 2021-09-18 ENCOUNTER — HOSPITAL ENCOUNTER (OUTPATIENT)
Dept: GENERAL RADIOLOGY | Facility: HOSPITAL | Age: 56
Discharge: HOME | End: 2021-09-18
Attending: FAMILY MEDICINE
Payer: COMMERCIAL

## 2021-09-18 DIAGNOSIS — M54.50 DORSALGIA OF LUMBAR REGION: ICD-10-CM

## 2021-09-18 DIAGNOSIS — M54.32 SCIATICA OF LEFT SIDE: ICD-10-CM

## 2021-09-18 PROCEDURE — 72110 X-RAY EXAM L-2 SPINE 4/>VWS: CPT | Performed by: FAMILY MEDICINE

## 2021-09-27 ENCOUNTER — OFFICE VISIT (OUTPATIENT)
Dept: PHYSICAL MEDICINE AND REHAB | Facility: CLINIC | Age: 56
End: 2021-09-27
Payer: COMMERCIAL

## 2021-09-27 ENCOUNTER — TELEPHONE (OUTPATIENT)
Dept: PHYSICAL MEDICINE AND REHAB | Facility: CLINIC | Age: 56
End: 2021-09-27

## 2021-09-27 VITALS
OXYGEN SATURATION: 100 % | HEART RATE: 93 BPM | SYSTOLIC BLOOD PRESSURE: 109 MMHG | WEIGHT: 156 LBS | DIASTOLIC BLOOD PRESSURE: 71 MMHG | BODY MASS INDEX: 25.07 KG/M2 | HEIGHT: 66 IN

## 2021-09-27 DIAGNOSIS — Z85.3 HISTORY OF BREAST CANCER: ICD-10-CM

## 2021-09-27 DIAGNOSIS — G47.00 INSOMNIA, UNSPECIFIED TYPE: ICD-10-CM

## 2021-09-27 DIAGNOSIS — M54.16 LUMBAR RADICULOPATHY: Primary | ICD-10-CM

## 2021-09-27 PROCEDURE — 99244 OFF/OP CNSLTJ NEW/EST MOD 40: CPT | Performed by: PHYSICAL MEDICINE & REHABILITATION

## 2021-09-27 PROCEDURE — 3008F BODY MASS INDEX DOCD: CPT | Performed by: PHYSICAL MEDICINE & REHABILITATION

## 2021-09-27 PROCEDURE — 3078F DIAST BP <80 MM HG: CPT | Performed by: PHYSICAL MEDICINE & REHABILITATION

## 2021-09-27 PROCEDURE — 3074F SYST BP LT 130 MM HG: CPT | Performed by: PHYSICAL MEDICINE & REHABILITATION

## 2021-09-27 RX ORDER — CYCLOBENZAPRINE HCL 5 MG
5 TABLET ORAL NIGHTLY
Qty: 30 TABLET | Refills: 0 | Status: SHIPPED | OUTPATIENT
Start: 2021-09-27 | End: 2021-10-27

## 2021-09-27 RX ORDER — MELOXICAM 15 MG/1
15 TABLET ORAL DAILY
Qty: 30 TABLET | Refills: 0 | Status: SHIPPED | OUTPATIENT
Start: 2021-09-27 | End: 2021-10-25

## 2021-09-27 NOTE — PROGRESS NOTES
[address]  Progress Note    CHIEF COMPLAINT:  Patient presents with:  Hip Pain: New right handed pt comes in with right hip pain that radiates back side to ankle to digits 4/5. Referred by Dr Ynes gaona N/A 2000    vocal chord surgery- extra/benign tissue       SOCIAL HISTORY:   Social History    Occupational History      Occupation: teacher    Tobacco Use      Smoking status: Former Smoker        Types: Cigarettes      Smokeless tobacco: Never Used kg/m²     Body mass index is 25.18 kg/m². General: No immediate distress  Head: Normocephalic/ Atraumatic  Extremities: No lower extremity edema bilaterally. Peripheral pulses intact.    Spine: Full lumbar range of motion, extension seems to aggravate

## 2021-09-27 NOTE — TELEPHONE ENCOUNTER
Initiated authorization for L-Spine MRI CPT 33834 to be done at [address] [place] [address] via [employer] is active    Status: Approved with order #[id number] valid 9/27/21-11/25/21    Patient notified of the above and was transferred to [address] [place] [address] scheduling

## 2021-09-28 RX ORDER — MELOXICAM 15 MG/1
TABLET ORAL
Qty: 90 TABLET | Refills: 0 | OUTPATIENT
Start: 2021-09-28

## 2021-10-01 ENCOUNTER — NURSE TRIAGE (OUTPATIENT)
Dept: FAMILY MEDICINE CLINIC | Facility: CLINIC | Age: 56
End: 2021-10-01

## 2021-10-01 ENCOUNTER — HOSPITAL ENCOUNTER (OUTPATIENT)
Age: 56
Discharge: HOME | End: 2021-10-01
Attending: PHYSICIAN ASSISTANT
Payer: COMMERCIAL

## 2021-10-01 VITALS
RESPIRATION RATE: 18 BRPM | DIASTOLIC BLOOD PRESSURE: 81 MMHG | HEIGHT: 66 IN | TEMPERATURE: 97 F | SYSTOLIC BLOOD PRESSURE: 123 MMHG | WEIGHT: 160 LBS | BODY MASS INDEX: 25.71 KG/M2 | HEART RATE: 90 BPM | OXYGEN SATURATION: 100 %

## 2021-10-01 DIAGNOSIS — R82.90 ABNORMAL URINE FINDINGS: ICD-10-CM

## 2021-10-01 DIAGNOSIS — N89.8 VAGINAL DISCHARGE: Primary | ICD-10-CM

## 2021-10-01 PROCEDURE — 87086 URINE CULTURE/COLONY COUNT: CPT | Performed by: PHYSICIAN ASSISTANT

## 2021-10-01 PROCEDURE — 87106 FUNGI IDENTIFICATION YEAST: CPT | Performed by: PHYSICIAN ASSISTANT

## 2021-10-01 PROCEDURE — 99214 OFFICE O/P EST MOD 30 MIN: CPT | Performed by: PHYSICIAN ASSISTANT

## 2021-10-01 PROCEDURE — 87808 TRICHOMONAS ASSAY W/OPTIC: CPT | Performed by: PHYSICIAN ASSISTANT

## 2021-10-01 PROCEDURE — 87491 CHLMYD TRACH DNA AMP PROBE: CPT | Performed by: PHYSICIAN ASSISTANT

## 2021-10-01 PROCEDURE — 87205 SMEAR GRAM STAIN: CPT | Performed by: PHYSICIAN ASSISTANT

## 2021-10-01 PROCEDURE — 87591 N.GONORRHOEAE DNA AMP PROB: CPT | Performed by: PHYSICIAN ASSISTANT

## 2021-10-01 PROCEDURE — 81002 URINALYSIS NONAUTO W/O SCOPE: CPT | Performed by: PHYSICIAN ASSISTANT

## 2021-10-01 RX ORDER — METRONIDAZOLE 500 MG/1
500 TABLET ORAL 2 TIMES DAILY
Qty: 14 TABLET | Refills: 0 | Status: SHIPPED | OUTPATIENT
Start: 2021-10-01 | End: 2022-01-20

## 2021-10-02 NOTE — ED PROVIDER NOTES
Patient Seen in: [facility]    History   Patient presents with:  Vaginal Discharge    Stated Complaint: Urinary Symptoms    HPI    Ale Islas is a 60year old female who presents with chief complaint of vaginal discharge.   Onset 1 week ag comment: quit 1985    Vaping Use      Vaping Use: Never used    Alcohol use: Never      Alcohol/week: 0.0 standard drinks    Drug use: No      Review of Systems    Positive for stated complaint: Urinary Symptoms  Other systems are as noted in HPI.   Constit motion tenderness. No adnexal tenderness or mass bilaterally. Uterus nontender. Lymphatic: No gross lymphadenopathy noted. Musculoskeletal: Musculoskeletal system is grossly intact. There is no obvious deformity.   Neurological: Gross motor movement is

## 2021-10-02 NOTE — ED INITIAL ASSESSMENT (HPI)
No vaginal itching  Vaginal discharge- yellow, gray with fishy odor  NO bloody discharge  No urinary symptoms

## 2021-10-18 ENCOUNTER — HOSPITAL ENCOUNTER (OUTPATIENT)
Dept: MRI IMAGING | Facility: HOSPITAL | Age: 56
Discharge: HOME | End: 2021-10-18
Attending: PHYSICAL MEDICINE & REHABILITATION
Payer: COMMERCIAL

## 2021-10-18 DIAGNOSIS — M54.16 LUMBAR RADICULOPATHY: ICD-10-CM

## 2021-10-18 PROCEDURE — 72148 MRI LUMBAR SPINE W/O DYE: CPT | Performed by: PHYSICAL MEDICINE & REHABILITATION

## 2021-10-25 ENCOUNTER — TELEPHONE (OUTPATIENT)
Dept: NEUROLOGY | Facility: CLINIC | Age: 56
End: 2021-10-25

## 2021-10-25 ENCOUNTER — OFFICE VISIT (OUTPATIENT)
Dept: PHYSICAL MEDICINE AND REHAB | Facility: CLINIC | Age: 56
End: 2021-10-25
Payer: COMMERCIAL

## 2021-10-25 VITALS — BODY MASS INDEX: 25.71 KG/M2 | OXYGEN SATURATION: 98 % | HEART RATE: 88 BPM | HEIGHT: 66 IN | WEIGHT: 160 LBS

## 2021-10-25 DIAGNOSIS — M54.16 LUMBAR RADICULOPATHY: Primary | ICD-10-CM

## 2021-10-25 DIAGNOSIS — Z85.3 HISTORY OF BREAST CANCER: ICD-10-CM

## 2021-10-25 DIAGNOSIS — G47.00 INSOMNIA, UNSPECIFIED TYPE: ICD-10-CM

## 2021-10-25 PROCEDURE — 3008F BODY MASS INDEX DOCD: CPT | Performed by: PHYSICAL MEDICINE & REHABILITATION

## 2021-10-25 PROCEDURE — 99214 OFFICE O/P EST MOD 30 MIN: CPT | Performed by: PHYSICAL MEDICINE & REHABILITATION

## 2021-10-25 RX ORDER — MELOXICAM 15 MG/1
15 TABLET ORAL DAILY
Qty: 30 TABLET | Refills: 0 | Status: SHIPPED | OUTPATIENT
Start: 2021-10-25 | End: 2021-11-30

## 2021-10-25 NOTE — PROGRESS NOTES
[address]  Progress Note    CHIEF COMPLAINT:  Patient presents with:  Back Pain: LOV 09/27/21 Pt comes in for f/u n MRI results from back & hip pain.  Stated Felxeril helped 50% but still has N/T and aching November 16th, 2020   • NEEDLE BIOPSY LEFT Left 05/2018    benign   • NEEDLE BIOPSY RIGHT Right 05/2018    benign   • OTHER N/A 2000    vocal chord surgery- extra/benign tissue       SOCIAL HISTORY:   Social History    Occupational History      Kaylan Carey comprehention intact, spontaneous speech intact  Strength: Lower extremities have 5/5 strength except difficulty with repetitive plantarflexion on the left.   Sensation: Normal lower extremities except decreased over the S1 distribution  Reflexes: Globally

## 2021-10-25 NOTE — TELEPHONE ENCOUNTER
----- Message from Suresh Sanchez MD sent at 10/19/2021  2:13 PM CDT -----  I personally reviewed a lumbar MRI from October 2021 showing multilevel disc degeneration, moderate central canal stenosis on a multifactorial basis at L3-4 and L4-5.  There is a

## 2021-10-27 RX ORDER — MELOXICAM 15 MG/1
TABLET ORAL
Qty: 90 TABLET | Refills: 0 | OUTPATIENT
Start: 2021-10-27

## 2021-11-05 ENCOUNTER — OFFICE VISIT (OUTPATIENT)
Dept: SURGERY | Facility: CLINIC | Age: 56
End: 2021-11-05
Payer: COMMERCIAL

## 2021-11-05 DIAGNOSIS — Z15.09 BRCA2 POSITIVE: Primary | ICD-10-CM

## 2021-11-05 DIAGNOSIS — Z15.01 BRCA2 POSITIVE: Primary | ICD-10-CM

## 2021-11-05 PROCEDURE — 99212 OFFICE O/P EST SF 10 MIN: CPT | Performed by: SURGERY

## 2021-11-05 NOTE — PROGRESS NOTES
Malcolm Mejia is a 60year old female who presents today for a follow-up. She is without new complaints and did resume regular activity including running. She is overall pleased with the result.       Physical Examination:  Breasts: Breast show well-hea

## 2021-11-13 ENCOUNTER — NURSE TRIAGE (OUTPATIENT)
Dept: FAMILY MEDICINE CLINIC | Facility: CLINIC | Age: 56
End: 2021-11-13

## 2021-11-13 RX ORDER — HYDROCODONE BITARTRATE AND ACETAMINOPHEN 5; 325 MG/1; MG/1
1-2 TABLET ORAL EVERY 4 HOURS PRN
Qty: 40 TABLET | Refills: 0 | Status: SHIPPED | OUTPATIENT
Start: 2021-11-13

## 2021-11-13 NOTE — TELEPHONE ENCOUNTER
Action Requested: Summary for Provider     []  Critical Lab, Recommendations Needed  [x] Need Additional Advice  []   FYI    []   Need Orders  [] Need Medications Sent to Pharmacy  []  Other     SUMMARY: Dr. Gurvinder Aguilera, patient is asking if you can prescribe stro

## 2021-11-13 NOTE — TELEPHONE ENCOUNTER
Message noted. May norco as requested. Erx sent to listed pharmacy.  To follow up for appointment if not better or go to immediate care/ ER if worse/ not better this weekend; Please notify patient

## 2021-11-22 ENCOUNTER — MED REC SCAN ONLY (OUTPATIENT)
Dept: FAMILY MEDICINE CLINIC | Facility: CLINIC | Age: 56
End: 2021-11-22

## 2021-11-27 NOTE — TELEPHONE ENCOUNTER
Left  Detailed message to call the office to schedule a follow up appointment with Dr. Marlo Bumpers. Per LOV:10/25/21 return in 4 weeks. No appointment scheduled. Medication request: meloxicam 15 mg oral tab. Take 1 tablet by mouth daily. #30. No refills.

## 2021-11-30 RX ORDER — MELOXICAM 15 MG/1
TABLET ORAL
Qty: 30 TABLET | Refills: 0 | Status: SHIPPED | OUTPATIENT
Start: 2021-11-30 | End: 2021-12-30

## 2021-11-30 NOTE — TELEPHONE ENCOUNTER
Medication request:  Meloxicam 15 MG Oral Tab  Sig:   Take 1 tablet (15 mg total) by mouth daily.     LOV: 10/25/2021  NOV: 12/21/2021    [place]: 10[place]/2021  Qty: 30, #R: 0, 30 day supply

## 2021-12-01 RX ORDER — MELOXICAM 15 MG/1
TABLET ORAL
Qty: 90 TABLET | Refills: 0 | OUTPATIENT
Start: 2021-12-01 | End: 2021-12-31

## 2021-12-01 NOTE — TELEPHONE ENCOUNTER
Medication request: MELOXICAM 15 MG Oral Tab  Sig:   TAKE 1 TABLET(15 MG) BY MOUTH DAILY    LOV:10/25/21  [id number]    [place]: 10/27/2021  QTY: 30; 30 day supply

## 2021-12-08 ENCOUNTER — MED REC SCAN ONLY (OUTPATIENT)
Dept: PHYSICAL MEDICINE AND REHAB | Facility: CLINIC | Age: 56
End: 2021-12-08

## 2021-12-29 RX ORDER — MELOXICAM 15 MG/1
TABLET ORAL
Qty: 30 TABLET | Refills: 0 | OUTPATIENT
Start: 2021-12-29 | End: 2022-01-28

## 2021-12-29 NOTE — TELEPHONE ENCOUNTER
Mobic denied. Been on Mobic too long, too hazardous to take long-term. Will discuss at her follow-up.

## 2021-12-29 NOTE — TELEPHONE ENCOUNTER
Medication request:   MELOXICAM 15 MG Oral Tab    TAKE 1 TABLET(15 MG) BY MOUTH DAILY    LOV: 11/05/21  NOV: 01/04/22    [place]  12/01/21 #30 R-0

## 2022-01-17 ENCOUNTER — OFFICE VISIT (OUTPATIENT)
Dept: PHYSICAL MEDICINE AND REHAB | Facility: CLINIC | Age: 57
End: 2022-01-17
Payer: COMMERCIAL

## 2022-01-17 VITALS
HEIGHT: 66 IN | WEIGHT: 160 LBS | DIASTOLIC BLOOD PRESSURE: 80 MMHG | OXYGEN SATURATION: 99 % | SYSTOLIC BLOOD PRESSURE: 120 MMHG | HEART RATE: 90 BPM | BODY MASS INDEX: 25.71 KG/M2

## 2022-01-17 DIAGNOSIS — Z85.3 HISTORY OF BREAST CANCER: ICD-10-CM

## 2022-01-17 DIAGNOSIS — G47.00 INSOMNIA, UNSPECIFIED TYPE: ICD-10-CM

## 2022-01-17 DIAGNOSIS — M54.16 LUMBAR RADICULOPATHY: Primary | ICD-10-CM

## 2022-01-17 PROCEDURE — 3074F SYST BP LT 130 MM HG: CPT | Performed by: PHYSICAL MEDICINE & REHABILITATION

## 2022-01-17 PROCEDURE — 99213 OFFICE O/P EST LOW 20 MIN: CPT | Performed by: PHYSICAL MEDICINE & REHABILITATION

## 2022-01-17 PROCEDURE — 3008F BODY MASS INDEX DOCD: CPT | Performed by: PHYSICAL MEDICINE & REHABILITATION

## 2022-01-17 PROCEDURE — 3079F DIAST BP 80-89 MM HG: CPT | Performed by: PHYSICAL MEDICINE & REHABILITATION

## 2022-01-17 NOTE — PROGRESS NOTES
[address]  Progress Note    CHIEF COMPLAINT:  Patient presents with:  Back Pain: LOV 10/25/21 Pt comes in for f/u back pain. States  PT helped 90% Takes Meloxicam when in pain and really helps.  Rates pain 1 5-325 MG Oral Tab Take 1-2 tablets by mouth every 4 (four) hours as needed for Pain.  (Patient not taking: Reported on 1/17/2022) 40 tablet 0       ALLERGIES:   No Known Allergies    REVIEW OF SYSTEMS:   Patient-reported ROS  Constitutional  Sleep Tina Yoon encouraged her to discontinue meloxicam.  She has not taken in 3 weeks anyway. She will use Aleve as needed. She will continue home exercises and return as needed. 2. History of breast cancer  No metastases seen on imaging    3.  Insomnia, unspecified

## 2022-01-19 ENCOUNTER — TELEPHONE (OUTPATIENT)
Dept: FAMILY MEDICINE CLINIC | Facility: CLINIC | Age: 57
End: 2022-01-19

## 2022-01-20 ENCOUNTER — TELEMEDICINE (OUTPATIENT)
Dept: FAMILY MEDICINE CLINIC | Facility: CLINIC | Age: 57
End: 2022-01-20

## 2022-01-20 DIAGNOSIS — B96.89 BACTERIAL VAGINOSIS: Primary | ICD-10-CM

## 2022-01-20 DIAGNOSIS — N76.0 BACTERIAL VAGINOSIS: Primary | ICD-10-CM

## 2022-01-20 PROCEDURE — 99213 OFFICE O/P EST LOW 20 MIN: CPT | Performed by: FAMILY MEDICINE

## 2022-01-20 RX ORDER — METRONIDAZOLE 500 MG/1
500 TABLET ORAL 2 TIMES DAILY
Qty: 14 TABLET | Refills: 0 | Status: SHIPPED | OUTPATIENT
Start: 2022-01-20 | End: 2022-01-27

## 2022-01-20 NOTE — PROGRESS NOTES
Subjective:   Patient ID: iMchael Resendiz is a 60year old female. This visit is conducted using Telemedicine with live, interactive video and audio during this Coronavirus pandemic.     Please note that the following visit was completed using two-way, re (two) times daily for 7 days. 14 tablet 0   • HYDROcodone-acetaminophen 5-325 MG Oral Tab Take 1-2 tablets by mouth every 4 (four) hours as needed for Pain.  (Patient not taking: Reported on 1/17/2022) 40 tablet 0     Allergies:No Known Allergies    Objecti

## 2022-03-24 ENCOUNTER — TELEPHONE (OUTPATIENT)
Dept: FAMILY MEDICINE CLINIC | Facility: CLINIC | Age: 57
End: 2022-03-24

## 2022-03-24 RX ORDER — METRONIDAZOLE 500 MG/1
500 TABLET ORAL 2 TIMES DAILY
Qty: 14 TABLET | Refills: 0 | Status: SHIPPED | OUTPATIENT
Start: 2022-03-24 | End: 2022-03-31

## 2022-03-24 NOTE — TELEPHONE ENCOUNTER
Patient states she's been experiencing vaginosis for 4 days now with watery yellow discharge and foul odor. Patient states she does get vaginosis from time to time and Dr. Roma Pop will typically call medication into her pharmacy. Last BV diagnosed on 7/21/21 with Dr. Roma Pop. Patient denies any pain or fever. Patient is going on vacation Saturday for one week.

## 2022-03-26 NOTE — TELEPHONE ENCOUNTER
Spoke with the patient,verified full name and , patient stated she did  the script no further questions

## 2022-04-05 ENCOUNTER — NURSE TRIAGE (OUTPATIENT)
Dept: FAMILY MEDICINE CLINIC | Facility: CLINIC | Age: 57
End: 2022-04-05

## 2022-04-06 ENCOUNTER — TELEMEDICINE (OUTPATIENT)
Dept: FAMILY MEDICINE CLINIC | Facility: CLINIC | Age: 57
End: 2022-04-06

## 2022-04-06 ENCOUNTER — LAB ENCOUNTER (OUTPATIENT)
Dept: LAB | Facility: HOSPITAL | Age: 57
End: 2022-04-06
Attending: NURSE PRACTITIONER
Payer: COMMERCIAL

## 2022-04-06 DIAGNOSIS — R19.7 DIARRHEA, UNSPECIFIED TYPE: ICD-10-CM

## 2022-04-06 DIAGNOSIS — J06.9 VIRAL UPPER RESPIRATORY TRACT INFECTION: ICD-10-CM

## 2022-04-06 DIAGNOSIS — J06.9 VIRAL UPPER RESPIRATORY TRACT INFECTION: Primary | ICD-10-CM

## 2022-04-06 PROCEDURE — 99213 OFFICE O/P EST LOW 20 MIN: CPT | Performed by: NURSE PRACTITIONER

## 2022-04-06 NOTE — ASSESSMENT & PLAN NOTE
Supportive care discussed to help alleviate symptoms  Will test for covid  Please call if symptoms worsen or are not resolving. Discussed w pt red flag symptoms that if they occur, pt should immediately go to ER. Pt states understanding.

## 2022-04-07 LAB — SARS-COV-2 RNA RESP QL NAA+PROBE: NOT DETECTED

## 2022-04-08 ENCOUNTER — TELEPHONE (OUTPATIENT)
Dept: FAMILY MEDICINE CLINIC | Facility: CLINIC | Age: 57
End: 2022-04-08

## 2022-04-08 ENCOUNTER — TELEMEDICINE (OUTPATIENT)
Dept: FAMILY MEDICINE CLINIC | Facility: CLINIC | Age: 57
End: 2022-04-08

## 2022-04-08 DIAGNOSIS — J01.00 ACUTE NON-RECURRENT MAXILLARY SINUSITIS: Primary | ICD-10-CM

## 2022-04-08 PROCEDURE — 99213 OFFICE O/P EST LOW 20 MIN: CPT | Performed by: NURSE PRACTITIONER

## 2022-04-08 RX ORDER — AMOXICILLIN AND CLAVULANATE POTASSIUM 875; 125 MG/1; MG/1
1 TABLET, FILM COATED ORAL 2 TIMES DAILY
Qty: 20 TABLET | Refills: 0 | Status: SHIPPED | OUTPATIENT
Start: 2022-04-08 | End: 2022-04-18

## 2022-04-08 NOTE — TELEPHONE ENCOUNTER
Spoke with pt,  verified pt stated she had  Telemed visit with Marci Tobar on 22 for URI. Pt was tested for covid and was negative. Pt stated she stil not feeling well,  She's been sleeping all week, she have gone g to work yet, she has no runny nose but still c/o sore throat , headache, tired. Pt been taking advil allergy, ineffective. Pt req for abx. Pt was offered a f/u virtual visit and she agreed and stated understanding. Appt made.        FYI    Future Appointments   Date Time Provider Myra Marinelli   2022 10:30 AM [facility]   2022  2:30 PM Ibeth Narayanan MD [facility]

## 2022-04-08 NOTE — ASSESSMENT & PLAN NOTE
covid pcr is negative  augmentin 875 mg I po bid x 10 days  Supportive care discussed to help alleviate symptoms  Please call if symptoms worsen or are not resolving.

## 2022-05-04 ENCOUNTER — OFFICE VISIT (OUTPATIENT)
Dept: FAMILY MEDICINE CLINIC | Facility: CLINIC | Age: 57
End: 2022-05-04
Payer: COMMERCIAL

## 2022-05-04 ENCOUNTER — NURSE TRIAGE (OUTPATIENT)
Dept: FAMILY MEDICINE CLINIC | Facility: CLINIC | Age: 57
End: 2022-05-04

## 2022-05-04 VITALS
HEART RATE: 87 BPM | DIASTOLIC BLOOD PRESSURE: 86 MMHG | WEIGHT: 154 LBS | TEMPERATURE: 97 F | HEIGHT: 66 IN | BODY MASS INDEX: 24.75 KG/M2 | SYSTOLIC BLOOD PRESSURE: 122 MMHG

## 2022-05-04 DIAGNOSIS — R30.0 BURNING WITH URINATION: Primary | ICD-10-CM

## 2022-05-04 LAB
APPEARANCE: CLEAR
BILIRUBIN: NEGATIVE
GLUCOSE (URINE DIPSTICK): NEGATIVE MG/DL
KETONES (URINE DIPSTICK): NEGATIVE MG/DL
MULTISTIX LOT#: ABNORMAL NUMERIC
NITRITE, URINE: NEGATIVE
PH, URINE: 5 (ref 4.5–8)
PROTEIN (URINE DIPSTICK): NEGATIVE MG/DL
SPECIFIC GRAVITY: 1.02 (ref 1–1.03)
URINE-COLOR: YELLOW
UROBILINOGEN,SEMI-QN: 0.2 MG/DL (ref 0–1.9)

## 2022-05-04 PROCEDURE — 99213 OFFICE O/P EST LOW 20 MIN: CPT

## 2022-05-04 PROCEDURE — 81002 URINALYSIS NONAUTO W/O SCOPE: CPT

## 2022-05-04 PROCEDURE — 3008F BODY MASS INDEX DOCD: CPT

## 2022-05-04 PROCEDURE — 3074F SYST BP LT 130 MM HG: CPT

## 2022-05-04 PROCEDURE — 3079F DIAST BP 80-89 MM HG: CPT

## 2022-05-04 RX ORDER — SULFAMETHOXAZOLE AND TRIMETHOPRIM 800; 160 MG/1; MG/1
1 TABLET ORAL 2 TIMES DAILY
Qty: 6 TABLET | Refills: 0 | Status: SHIPPED | OUTPATIENT
Start: 2022-05-04 | End: 2022-05-07

## 2022-05-05 PROBLEM — R30.0 BURNING WITH URINATION: Status: ACTIVE | Noted: 2022-05-05

## 2022-05-10 ENCOUNTER — OFFICE VISIT (OUTPATIENT)
Dept: FAMILY MEDICINE CLINIC | Facility: CLINIC | Age: 57
End: 2022-05-10
Payer: COMMERCIAL

## 2022-05-10 ENCOUNTER — TELEPHONE (OUTPATIENT)
Dept: FAMILY MEDICINE CLINIC | Facility: CLINIC | Age: 57
End: 2022-05-10

## 2022-05-10 VITALS
SYSTOLIC BLOOD PRESSURE: 106 MMHG | WEIGHT: 157 LBS | DIASTOLIC BLOOD PRESSURE: 72 MMHG | HEART RATE: 91 BPM | HEIGHT: 66 IN | BODY MASS INDEX: 25.23 KG/M2

## 2022-05-10 DIAGNOSIS — A60.00 GENITAL HERPES SIMPLEX, UNSPECIFIED SITE: Primary | ICD-10-CM

## 2022-05-10 PROCEDURE — 3078F DIAST BP <80 MM HG: CPT | Performed by: FAMILY MEDICINE

## 2022-05-10 PROCEDURE — 99213 OFFICE O/P EST LOW 20 MIN: CPT | Performed by: FAMILY MEDICINE

## 2022-05-10 PROCEDURE — 3008F BODY MASS INDEX DOCD: CPT | Performed by: FAMILY MEDICINE

## 2022-05-10 PROCEDURE — 3074F SYST BP LT 130 MM HG: CPT | Performed by: FAMILY MEDICINE

## 2022-05-10 RX ORDER — VALACYCLOVIR HYDROCHLORIDE 1 G/1
TABLET, FILM COATED ORAL
Qty: 20 TABLET | Refills: 5 | Status: SHIPPED | OUTPATIENT
Start: 2022-05-10

## 2022-05-10 RX ORDER — HYDROCODONE BITARTRATE AND ACETAMINOPHEN 5; 325 MG/1; MG/1
1 TABLET ORAL EVERY 6 HOURS PRN
Qty: 30 TABLET | Refills: 0 | Status: SHIPPED | OUTPATIENT
Start: 2022-05-10

## 2022-05-10 NOTE — PROGRESS NOTES
Blood pressure 106/72, pulse 91, height 5' 6\" (1.676 m), weight 157 lb (71.2 kg), last menstrual period 06/29/2015, not currently breastfeeding. Patient presents today complaining of discomfort down in the genital area. She reports that she does not get relief with ibuprofen. She denies abdominal pain or fever. No pain with intercourse. She just started becoming sexually active again in the past month. She denies vaginal discharge or itching. She was treated for urinary tract infection also used Monistat. Objective    Abdomen is soft nontender nondistended    (Anna Jaguar Grieve) external genitalia within normal limits labia minora with erosions and vesicles noted    Assessment herpes genitalis    Plan patient will follow-up with gynecology tomorrow start Valtrex and Norco for pain    MEDICATION MAY CAUSE DROWSINESS. DO NOT DRIVE OR OPERATE HEAVY MACHINERY.

## 2022-05-10 NOTE — TELEPHONE ENCOUNTER
Spoke to patient. She was seen in the office on 5/4/22. She thinks she developed a yeast infection after antibiotics for UTI. She also thinks she might still have a UTI. She feels \"weird\" and wants to be seen again. She used an OTC Monistat product and wiped orange this morning. She is afraid it was blood. There was no mannie bleeding. She just feels very sore in the vaginal area.      Future Appointments   Date Time Provider Myra Marinelli   5/10/2022  6:10 [facility]   11/11/2022  2:30 PM Susan Lozano MD [facility]

## 2022-05-11 ENCOUNTER — TELEPHONE (OUTPATIENT)
Dept: FAMILY MEDICINE CLINIC | Facility: CLINIC | Age: 57
End: 2022-05-11

## 2022-05-11 ENCOUNTER — HOSPITAL ENCOUNTER (EMERGENCY)
Facility: HOSPITAL | Age: 57
Discharge: HOME | End: 2022-05-11
Attending: EMERGENCY MEDICINE
Payer: COMMERCIAL

## 2022-05-11 ENCOUNTER — OFFICE VISIT (OUTPATIENT)
Dept: OBGYN CLINIC | Facility: CLINIC | Age: 57
End: 2022-05-11
Payer: COMMERCIAL

## 2022-05-11 ENCOUNTER — LAB ENCOUNTER (OUTPATIENT)
Dept: LAB | Facility: HOSPITAL | Age: 57
End: 2022-05-11
Attending: NURSE PRACTITIONER
Payer: COMMERCIAL

## 2022-05-11 VITALS
TEMPERATURE: 100 F | BODY MASS INDEX: 22.82 KG/M2 | DIASTOLIC BLOOD PRESSURE: 88 MMHG | WEIGHT: 142 LBS | HEIGHT: 66 IN | HEART RATE: 88 BPM | SYSTOLIC BLOOD PRESSURE: 138 MMHG | RESPIRATION RATE: 20 BRPM | OXYGEN SATURATION: 98 %

## 2022-05-11 VITALS — SYSTOLIC BLOOD PRESSURE: 126 MMHG | DIASTOLIC BLOOD PRESSURE: 80 MMHG

## 2022-05-11 DIAGNOSIS — Z15.09 BRCA GENE MUTATION POSITIVE IN FEMALE: ICD-10-CM

## 2022-05-11 DIAGNOSIS — A60.09 HERPES GENITALIS IN WOMEN: Primary | ICD-10-CM

## 2022-05-11 DIAGNOSIS — Z11.3 SCREENING EXAMINATION FOR SEXUALLY TRANSMITTED DISEASE: ICD-10-CM

## 2022-05-11 DIAGNOSIS — R39.11 URINARY HESITANCY: ICD-10-CM

## 2022-05-11 DIAGNOSIS — R39.11 URINARY HESITANCY: Primary | ICD-10-CM

## 2022-05-11 DIAGNOSIS — Z15.02 BRCA GENE MUTATION POSITIVE IN FEMALE: ICD-10-CM

## 2022-05-11 DIAGNOSIS — A60.09 HERPES GENITALIS IN WOMEN: ICD-10-CM

## 2022-05-11 DIAGNOSIS — M54.30 SCIATICA, UNSPECIFIED LATERALITY: ICD-10-CM

## 2022-05-11 DIAGNOSIS — M54.40 LOW BACK PAIN WITH SCIATICA, SCIATICA LATERALITY UNSPECIFIED, UNSPECIFIED BACK PAIN LATERALITY, UNSPECIFIED CHRONICITY: ICD-10-CM

## 2022-05-11 DIAGNOSIS — Z15.01 BRCA GENE MUTATION POSITIVE IN FEMALE: ICD-10-CM

## 2022-05-11 LAB
BILIRUB UR QL: NEGATIVE
COLOR UR: YELLOW
GLUCOSE UR-MCNC: NEGATIVE MG/DL
HBV SURFACE AG SER-ACNC: <0.1 [IU]/L
HBV SURFACE AG SERPL QL IA: NONREACTIVE
HCV AB SERPL QL IA: NONREACTIVE
HGB UR QL STRIP.AUTO: NEGATIVE
KETONES UR-MCNC: NEGATIVE MG/DL
NITRITE UR QL STRIP.AUTO: NEGATIVE
PH UR: 6 [PH] (ref 5–8)
PROT UR-MCNC: NEGATIVE MG/DL
SP GR UR STRIP: 1.01 (ref 1–1.03)
UROBILINOGEN UR STRIP-ACNC: <2
VIT C UR-MCNC: NEGATIVE MG/DL

## 2022-05-11 PROCEDURE — 87389 HIV-1 AG W/HIV-1&-2 AB AG IA: CPT

## 2022-05-11 PROCEDURE — 81001 URINALYSIS AUTO W/SCOPE: CPT | Performed by: EMERGENCY MEDICINE

## 2022-05-11 PROCEDURE — 81001 URINALYSIS AUTO W/SCOPE: CPT

## 2022-05-11 PROCEDURE — 3079F DIAST BP 80-89 MM HG: CPT | Performed by: NURSE PRACTITIONER

## 2022-05-11 PROCEDURE — 87086 URINE CULTURE/COLONY COUNT: CPT | Performed by: EMERGENCY MEDICINE

## 2022-05-11 PROCEDURE — 86695 HERPES SIMPLEX TYPE 1 TEST: CPT

## 2022-05-11 PROCEDURE — 36415 COLL VENOUS BLD VENIPUNCTURE: CPT

## 2022-05-11 PROCEDURE — 87086 URINE CULTURE/COLONY COUNT: CPT

## 2022-05-11 PROCEDURE — 99283 EMERGENCY DEPT VISIT LOW MDM: CPT

## 2022-05-11 PROCEDURE — 86803 HEPATITIS C AB TEST: CPT

## 2022-05-11 PROCEDURE — 99214 OFFICE O/P EST MOD 30 MIN: CPT | Performed by: NURSE PRACTITIONER

## 2022-05-11 PROCEDURE — 86696 HERPES SIMPLEX TYPE 2 TEST: CPT

## 2022-05-11 PROCEDURE — 86780 TREPONEMA PALLIDUM: CPT

## 2022-05-11 PROCEDURE — 87340 HEPATITIS B SURFACE AG IA: CPT

## 2022-05-11 PROCEDURE — 3074F SYST BP LT 130 MM HG: CPT | Performed by: NURSE PRACTITIONER

## 2022-05-11 RX ORDER — METHYLPREDNISOLONE 4 MG/1
TABLET ORAL
Qty: 1 EACH | Refills: 0 | Status: SHIPPED | OUTPATIENT
Start: 2022-05-11

## 2022-05-11 RX ORDER — LIDOCAINE 50 MG/G
1 OINTMENT TOPICAL 2 TIMES DAILY
Qty: 35 G | Refills: 0 | Status: SHIPPED | OUTPATIENT
Start: 2022-05-11 | End: 2022-05-21

## 2022-05-11 NOTE — TELEPHONE ENCOUNTER
Ren Burroughs NP with OB/GYN Dr. Isrrael Carlton group would like to speak to Dr. Yohana Murdock regarding patient who was seen today at office. Please advise.     # [phone]

## 2022-05-11 NOTE — ED QUICK NOTES
Patient sent from doctors office for evaluation of urinary retention. Patient states she was unable to provide a urine sample this morning during her visit. Patient able to provide urine sample here today, and bladder scanner shows empty bladder after urination. Patient has history of sciatica and reports pain and tingling down bilateral legs. Patient denies urinary or bowel incontinence.

## 2022-05-11 NOTE — ED INITIAL ASSESSMENT (HPI)
Patient presents from [address] [place] [address] for urinary retention. Pt states also having bilateral sciatica pain. Pt diagnosed with herpes yesterday, started medication yesterday as well.

## 2022-05-11 NOTE — TELEPHONE ENCOUNTER
Message noted and spoke with Joseph Nam about questions and recommended at least immediate care/ or ER visit to evaluate urinary retention and sig pains in light of recent hx and also hx of herniated disk.

## 2022-05-12 ENCOUNTER — LAB REQUISITION (OUTPATIENT)
Dept: LAB | Age: 57
End: 2022-05-12

## 2022-05-12 DIAGNOSIS — Z02.1 ENCOUNTER FOR PRE-EMPLOYMENT EXAMINATION: ICD-10-CM

## 2022-05-12 LAB
C TRACH DNA SPEC QL NAA+PROBE: NEGATIVE
HSV1 DNA SPEC QL NAA+PROBE: NEGATIVE
HSV2 DNA SPEC QL NAA+PROBE: POSITIVE
N GONORRHOEA DNA SPEC QL NAA+PROBE: NEGATIVE

## 2022-05-12 PROCEDURE — PSEU9049 QUANTIFERON TB PLUS: Performed by: CLINICAL MEDICAL LABORATORY

## 2022-05-12 PROCEDURE — 86480 TB TEST CELL IMMUN MEASURE: CPT | Performed by: CLINICAL MEDICAL LABORATORY

## 2022-05-13 LAB
GENITAL VAGINOSIS SCREEN: POSITIVE
HSV 1 GLYCOPROTEIN G, IGG: NEGATIVE
HSV 2 GLYCOPROTEIN G, IGG: NEGATIVE
T PALLIDUM AB SER QL: NEGATIVE
TRICHOMONAS SCREEN: NEGATIVE

## 2022-05-13 RX ORDER — METRONIDAZOLE 7.5 MG/G
1 GEL VAGINAL NIGHTLY
Qty: 1 EACH | Refills: 0 | Status: SHIPPED | OUTPATIENT
Start: 2022-05-13 | End: 2022-05-18

## 2022-05-14 LAB
GAMMA INTERFERON BACKGROUND BLD IA-ACNC: 0.04 IU/ML
M TB IFN-G BLD-IMP: NEGATIVE
M TB IFN-G CD4+ BCKGRND COR BLD-ACNC: 0 IU/ML
M TB IFN-G CD4+CD8+ BCKGRND COR BLD-ACNC: 0 IU/ML
MITOGEN IGNF BCKGRD COR BLD-ACNC: 1.77 IU/ML

## 2022-05-24 RX ORDER — CYCLOBENZAPRINE HCL 10 MG
10 TABLET ORAL NIGHTLY PRN
Qty: 30 TABLET | Refills: 0 | Status: SHIPPED | OUTPATIENT
Start: 2022-05-24

## 2022-05-24 NOTE — TELEPHONE ENCOUNTER
Please review. Protocol Failed or has No Protocol.     Requested Prescriptions   Pending Prescriptions Disp Refills    CYCLOBENZAPRINE 10 MG Oral Tab [Pharmacy Med Name: CYCLOBENZAPRINE 10MG TABLETS] 30 tablet 0     Sig: TAKE 1 TABLET(10 MG) BY MOUTH EVERY NIGHT AS NEEDED        There is no refill protocol information for this order           Future Appointments         Provider Department Appt Notes    In 5 months Melanie Holly MD [facility] Annual            Recent Outpatient Visits              1 week ago Herpes genitalis in women    [facility], [facility], [address], [facility] worth, APRN    Office Visit    2 weeks ago Genital herpes simplex, unspecified site    [address] [facility],     Office Visit    2 weeks ago Burning with urination    [facility], [facility], [address], [place]    Office Visit    1 month ago Acute non-recurrent maxillary sinusitis    [address], APRN    Telemedicine    1 month ago Viral upper respiratory tract infection    [facility], [facility], [address], [address], APRN    Telemedicine

## 2022-05-24 NOTE — TELEPHONE ENCOUNTER
Message noted: Chart reviewed and may refill medication as requested. Prescription sent to listed pharmacy. Pharmacy to notify patient.  Pt notified through [facility]

## 2022-05-31 ENCOUNTER — TELEPHONE (OUTPATIENT)
Dept: NEUROLOGY | Facility: CLINIC | Age: 57
End: 2022-05-31

## 2022-06-01 NOTE — TELEPHONE ENCOUNTER
Rx Meloxicam will be denied due to last OV:  ASSESSMENT AND PLAN:  1. Lumbar radiculopathy  Doing well. I encouraged her to discontinue meloxicam.  She has not taken in 3 weeks anyway. She will use Aleve as needed. She will continue home exercises and return as needed.

## 2022-06-01 NOTE — PROGRESS NOTES
"Goal Outcome Evaluation:    Plan of Care Reviewed With: patient     Overall Patient Progress: no change    Outcome Evaluation: Pt hypotensive, 25g albumin given. No signs of GI bleed.    Assumed cares: [id number]    BP 98/50 (BP Location: Left arm)   Pulse 72   Temp 98  F (36.7  C) (Oral)   Resp 16   Ht 1.651 m (5' 5\")   Wt 57 kg (125 lb 9.6 oz)   SpO2 98%   BMI 20.90 kg/m       IV: PIV saline locked.   Neuro: A&Ox4  GI/: Voiding adequately. Pt denies bloody/black stools  Nutrition: Poor appetite, family brought food.   Skin: Shingles noted on left chest/back.  Pain: Pt c/o pain in left abdomen/back and intermittent headache, scheduled tylenol given.   Activity: Pt up independently, pt endorses dizzy w/ ambulation, encouraged to call for assistance.     Plan for discharge home w/ son tomorrow.   " Referring Physician: Dr. Joann Leal  Diagnosis: breast cancer     Date of onset: 11/16/2020 Evaluation Date: 12/9/2020         Physical Therapy Lymphedema Daily Note    Education or treatment limitation:  breast cancer, expanders    Precautions:  None    Pas (10 minutes):   Management/Education: Explained Lymphedema diagnosis; informed patient of lymphedema precautions and risk reduction practices, and importance of skin care.  Discussed and demonstrated bandaging and compression options including various vendo

## 2022-06-03 NOTE — TELEPHONE ENCOUNTER
Please review refill protocol failed/ no protocol  Requested Prescriptions   Pending Prescriptions Disp Refills    CYCLOBENZAPRINE 10 MG Oral Tab [Pharmacy Med Name: CYCLOBENZAPRINE 10MG TABLETS] 30 tablet 0     Sig: TAKE 1 TABLET(10 MG) BY MOUTH EVERY NIGHT AS NEEDED        There is no refill protocol information for this order

## 2022-06-04 RX ORDER — CYCLOBENZAPRINE HCL 10 MG
10 TABLET ORAL NIGHTLY PRN
Qty: 30 TABLET | Refills: 0 | Status: SHIPPED | OUTPATIENT
Start: 2022-06-04

## 2022-06-04 NOTE — TELEPHONE ENCOUNTER
Message noted: Chart reviewed and may refill medication as requested. Prescription sent to listed pharmacy. Pharmacy to notify patient.  Pt notified through [facility]

## 2022-06-07 RX ORDER — MELOXICAM 15 MG/1
TABLET ORAL
Qty: 30 TABLET | Refills: 0 | OUTPATIENT
Start: 2022-06-07

## 2022-08-01 PROCEDURE — 88342 IMHCHEM/IMCYTCHM 1ST ANTB: CPT | Performed by: CLINICAL MEDICAL LABORATORY

## 2022-08-01 PROCEDURE — 88305 TISSUE EXAM BY PATHOLOGIST: CPT | Performed by: CLINICAL MEDICAL LABORATORY

## 2022-08-02 ENCOUNTER — LAB REQUISITION (OUTPATIENT)
Dept: LAB | Age: 57
End: 2022-08-02

## 2022-08-02 DIAGNOSIS — Z15.02 GENETIC SUSCEPTIBILITY TO MALIGNANT NEOPLASM OF OVARY: ICD-10-CM

## 2022-08-02 DIAGNOSIS — R87.810 CERVICAL HIGH RISK HUMAN PAPILLOMAVIRUS (HPV) DNA TEST POSITIVE: ICD-10-CM

## 2022-08-02 DIAGNOSIS — Z87.410 PERSONAL HISTORY OF CERVICAL DYSPLASIA: ICD-10-CM

## 2022-08-09 LAB
ASR DISCLAIMER: NORMAL
CASE RPRT: NORMAL
CLINICAL INFO: NORMAL
PATH REPORT.FINAL DX SPEC: NORMAL
PATH REPORT.FINAL DX SPEC: NORMAL
PATH REPORT.GROSS SPEC: NORMAL

## 2022-08-11 ENCOUNTER — TELEPHONE (OUTPATIENT)
Dept: FAMILY MEDICINE CLINIC | Facility: CLINIC | Age: 57
End: 2022-08-11

## 2022-09-02 ENCOUNTER — TELEMEDICINE (OUTPATIENT)
Dept: FAMILY MEDICINE CLINIC | Facility: CLINIC | Age: 57
End: 2022-09-02

## 2022-09-02 ENCOUNTER — LAB ENCOUNTER (OUTPATIENT)
Dept: LAB | Facility: HOSPITAL | Age: 57
End: 2022-09-02
Attending: PHYSICIAN ASSISTANT
Payer: COMMERCIAL

## 2022-09-02 DIAGNOSIS — J39.9 UPPER RESPIRATORY DISEASE: ICD-10-CM

## 2022-09-02 DIAGNOSIS — R09.81 NASAL CONGESTION: ICD-10-CM

## 2022-09-02 DIAGNOSIS — R05.1 ACUTE COUGH: ICD-10-CM

## 2022-09-02 DIAGNOSIS — J01.00 ACUTE NON-RECURRENT MAXILLARY SINUSITIS: ICD-10-CM

## 2022-09-02 DIAGNOSIS — J39.9 UPPER RESPIRATORY DISEASE: Primary | ICD-10-CM

## 2022-09-02 PROCEDURE — 99213 OFFICE O/P EST LOW 20 MIN: CPT | Performed by: PHYSICIAN ASSISTANT

## 2022-09-02 RX ORDER — BENZONATATE 200 MG/1
200 CAPSULE ORAL 3 TIMES DAILY PRN
Qty: 30 CAPSULE | Refills: 0 | Status: SHIPPED | OUTPATIENT
Start: 2022-09-02

## 2022-09-02 RX ORDER — FLUTICASONE PROPIONATE 50 MCG
2 SPRAY, SUSPENSION (ML) NASAL DAILY
Qty: 16 G | Refills: 2 | Status: SHIPPED | OUTPATIENT
Start: 2022-09-02 | End: 2022-10-02

## 2022-09-02 RX ORDER — AMOXICILLIN AND CLAVULANATE POTASSIUM 875; 125 MG/1; MG/1
1 TABLET, FILM COATED ORAL 2 TIMES DAILY
Qty: 20 TABLET | Refills: 0 | Status: SHIPPED | OUTPATIENT
Start: 2022-09-02 | End: 2022-09-12

## 2022-09-03 LAB — SARS-COV-2 RNA RESP QL NAA+PROBE: NOT DETECTED

## 2022-09-09 ENCOUNTER — HOSPITAL ENCOUNTER (OUTPATIENT)
Dept: ULTRASOUND IMAGING | Age: 57
Discharge: HOME | End: 2022-09-09
Attending: OBSTETRICS & GYNECOLOGY
Payer: COMMERCIAL

## 2022-09-09 DIAGNOSIS — R87.810 CERVICAL HIGH RISK HPV (HUMAN PAPILLOMAVIRUS) TEST POSITIVE: ICD-10-CM

## 2022-09-09 DIAGNOSIS — Z15.09 BRCA2 GENE MUTATION POSITIVE: ICD-10-CM

## 2022-09-09 DIAGNOSIS — Z15.01 BRCA2 GENE MUTATION POSITIVE: ICD-10-CM

## 2022-09-09 DIAGNOSIS — Z87.410 HISTORY OF CERVICAL DYSPLASIA: ICD-10-CM

## 2022-09-09 PROCEDURE — 76830 TRANSVAGINAL US NON-OB: CPT | Performed by: OBSTETRICS & GYNECOLOGY

## 2022-09-09 PROCEDURE — 76856 US EXAM PELVIC COMPLETE: CPT | Performed by: OBSTETRICS & GYNECOLOGY

## 2022-09-14 NOTE — H&P
Leah Mazariegos is a 58 y/o who presents as a kind referral from Dr. Linda Pedro and Dr. Malik Deshpande for evaluation and management of BRCA 2 mutation, normal but HPV positive pap smear. She was found to have BRCA 2 mutation in  - she initially went for evaluation with breast surgery, Dr. Linda Pedro, due to significant family history of breast cancer. She underwent work up which did reveal a very early stage left breast ca, she is now s/p bilateral mastectomies and reconstruction. She was then referred to gynecology for BSO discussion - pap smear obtained was normal but HPV positive, colposcopy with mild dysplasia. Given abnormal pap smear we discussed concurrent hysterectomy. PAST MEDICAL HISTORY:  genital HSV  BRCA 2 mutation    PAST SURGICAL HISTORY:bilateral mastectomies, colonoscopy , vocal chord surgery in     FAMILY HISTORY:breast cancer in her mother and sister, maternal aunt and maternal cousin. OB/GYN HISTORY:   with 4 SVDs and one EAB, menarche age 13 y/o, menopause 63 - age 47 y/o. +Hx of HPV positive pap smears. SOCIAL HISTORY:  She is a , lives alone, exercises 2-3 x per week, no etoh, no smoking or drug use     PREVENTATIVE SCREENING:  pap smear 2022 normal but HPV positive  mammogram :    colonoscopy :    Dexa : no     REVIEW OF SYSTEMS:  A 14 point review of systems was performed with any pertinent positives noted in the HPI and otherwise negative. Medications    No Medications     Allergies    No known medication allergies    Physical Exam    Blood pressure: 116/72, Sitting, R arm, Regular, Pulse: 105, Temperature: 97.6 F, Respirations: , O2 sat: 99%, At Rest, Room Air, Pain Scale: 0, Height: 65 in, Weight: 150.2 lb, BSA: 1.75, BMI: 24.99 kg/m2    . From office      GENERAL: Alert and oriented x's 3. Well appearing female in NAD. She comes in alone today   HEENT: Pupils are equal and reactive without scleral icterus.   Normal cephalic, atraumatic, PERRLA:  EOM's intact bilaterally. NECK: Trachea is midline. Supple, no LAD, no thyromegaly. BACK:  No CVA or spinous tenderness. LUNGS: CTAB, no wheezing. CARDIAC: RRR, no murmurs. ABDOMEN: Soft NTND, BS x's 4. PELVIC: Normal external genitalia. Smooth vaginal mucosa. Normal appearing cervix. Uterus is normal size, mobile. Do not appreciate any pelvic masses. Colposcopy of the cervix performed with no significant acetowhite change, iadequate as I couldnot visualize entire transformation zone due to stenosis/atrophy, no biopsy obtained. ECC obtained    RECTAL: Deferred. EXTREMITIES: No clubbing, cyanosis, or edema bilaterally. NEUROLOGIC: Cranial nerves are grossly intact bilaterally. PSYCHIATRIC: Appropriate mood and affect. Assessment & Plan    BRCA2 gene mutation positive (finding)  Cervical high risk HPV (human papillomavirus) test positive  History of cervical dysplasia  Cameron Reina is a 59 y.o. female with a newly diagnosed BRCA 2 mutation, family history of breast cancer and abnormal HPV positive pap smear. Today we reviewed the gynecologic cancer risks associated with a BRCA 2 mutation including ovarian, fallopian tube , primary peritoneal cancers and a possible increased risk of high grade endometrial cancers. We reviewed the 10-27% risk of an ovarian cancer over her lifetime and reviewed that in her case the risk may be lower given the absence of family members with ovarian cancer. We reviewed available screening strategies for ovarian cancer and reviewed that ultimately surgical prophylaxis is our best current modality to prevent ovarian cancer given the inability of available screening tests to detect ovarian cancer at a precancerous state or at an early stage. We reviewed options of surgical prophylaxis versus Transvaginal ultrasound and Ca-125 screening and the risks/benefits of each.  We also discussed the pros/cons to a hysterectomy given her BRCA 2 diagnosis and given HPV positive pap smears and hx of cervical dysplasia. The patient would like to proceed with surgical intervention. We discussed a robotic BSO with total hysterectomy, possible exploratory laparotomy and staging. We reviewed the 3-5% risk of finding an incidental cancer at the time of surgery and that I would recommend staging at the same time and the patient was agreeable to this. Risks reviewed including bleeding , infection, damage to surrounding organs/structures and medical complications. Post operative scripts given in the office, plan on same day discharge if patient does well pre and postop .

## 2022-09-26 ENCOUNTER — HOSPITAL ENCOUNTER (OUTPATIENT)
Dept: GENERAL RADIOLOGY | Facility: HOSPITAL | Age: 57
Discharge: HOME | End: 2022-09-26
Attending: FAMILY MEDICINE

## 2022-09-26 ENCOUNTER — LAB ENCOUNTER (OUTPATIENT)
Dept: LAB | Facility: HOSPITAL | Age: 57
End: 2022-09-26
Attending: FAMILY MEDICINE

## 2022-09-26 ENCOUNTER — OFFICE VISIT (OUTPATIENT)
Dept: FAMILY MEDICINE CLINIC | Facility: CLINIC | Age: 57
End: 2022-09-26

## 2022-09-26 VITALS
SYSTOLIC BLOOD PRESSURE: 124 MMHG | RESPIRATION RATE: 18 BRPM | DIASTOLIC BLOOD PRESSURE: 89 MMHG | BODY MASS INDEX: 23.46 KG/M2 | TEMPERATURE: 98 F | WEIGHT: 146 LBS | HEART RATE: 74 BPM | HEIGHT: 66 IN

## 2022-09-26 DIAGNOSIS — Z01.818 PREOPERATIVE GENERAL PHYSICAL EXAMINATION: ICD-10-CM

## 2022-09-26 DIAGNOSIS — Z01.818 PREOPERATIVE GENERAL PHYSICAL EXAMINATION: Primary | ICD-10-CM

## 2022-09-26 DIAGNOSIS — Z01.818 PRE-OPERATIVE GENERAL PHYSICAL EXAMINATION: Primary | ICD-10-CM

## 2022-09-26 LAB
ALBUMIN SERPL-MCNC: 4.1 G/DL (ref 3.4–5)
ALBUMIN/GLOB SERPL: 1.1 {RATIO} (ref 1–2)
ALP LIVER SERPL-CCNC: 92 U/L
ALT SERPL-CCNC: 24 U/L
ANION GAP SERPL CALC-SCNC: 5 MMOL/L (ref 0–18)
APTT PPP: 29.9 SECONDS (ref 23.3–35.6)
AST SERPL-CCNC: 15 U/L (ref 15–37)
BASOPHILS # BLD AUTO: 0.05 X10(3) UL (ref 0–0.2)
BASOPHILS NFR BLD AUTO: 1.2 %
BILIRUB SERPL-MCNC: 0.4 MG/DL (ref 0.1–2)
BUN BLD-MCNC: 8 MG/DL (ref 7–18)
BUN/CREAT SERPL: 9.2 (ref 10–20)
CALCIUM BLD-MCNC: 9.7 MG/DL (ref 8.5–10.1)
CHLORIDE SERPL-SCNC: 106 MMOL/L (ref 98–112)
CO2 SERPL-SCNC: 29 MMOL/L (ref 21–32)
CREAT BLD-MCNC: 0.87 MG/DL
DEPRECATED RDW RBC AUTO: 45.5 FL (ref 35.1–46.3)
EOSINOPHIL # BLD AUTO: 0.3 X10(3) UL (ref 0–0.7)
EOSINOPHIL NFR BLD AUTO: 7.2 %
ERYTHROCYTE [DISTWIDTH] IN BLOOD BY AUTOMATED COUNT: 12.8 % (ref 11–15)
FASTING STATUS PATIENT QL REPORTED: YES
GFR SERPLBLD BASED ON 1.73 SQ M-ARVRAT: 77 ML/MIN/1.73M2 (ref 60–?)
GLOBULIN PLAS-MCNC: 3.7 G/DL (ref 2.8–4.4)
GLUCOSE BLD-MCNC: 82 MG/DL (ref 70–99)
HCT VFR BLD AUTO: 45.5 %
HGB BLD-MCNC: 14.5 G/DL
IMM GRANULOCYTES # BLD AUTO: 0.01 X10(3) UL (ref 0–1)
IMM GRANULOCYTES NFR BLD: 0.2 %
INR BLD: 1.02 (ref 0.85–1.16)
LYMPHOCYTES # BLD AUTO: 1.81 X10(3) UL (ref 1–4)
LYMPHOCYTES NFR BLD AUTO: 43.4 %
MCH RBC QN AUTO: 30.7 PG (ref 26–34)
MCHC RBC AUTO-ENTMCNC: 31.9 G/DL (ref 31–37)
MCV RBC AUTO: 96.2 FL
MONOCYTES # BLD AUTO: 0.36 X10(3) UL (ref 0.1–1)
MONOCYTES NFR BLD AUTO: 8.6 %
NEUTROPHILS # BLD AUTO: 1.64 X10 (3) UL (ref 1.5–7.7)
NEUTROPHILS # BLD AUTO: 1.64 X10(3) UL (ref 1.5–7.7)
NEUTROPHILS NFR BLD AUTO: 39.4 %
OSMOLALITY SERPL CALC.SUM OF ELEC: 287 MOSM/KG (ref 275–295)
PLATELET # BLD AUTO: 312 10(3)UL (ref 150–450)
POTASSIUM SERPL-SCNC: 3.8 MMOL/L (ref 3.5–5.1)
PROT SERPL-MCNC: 7.8 G/DL (ref 6.4–8.2)
PROTHROMBIN TIME: 13.4 SECONDS (ref 11.6–14.8)
RBC # BLD AUTO: 4.73 X10(6)UL
SODIUM SERPL-SCNC: 140 MMOL/L (ref 136–145)
WBC # BLD AUTO: 4.2 X10(3) UL (ref 4–11)

## 2022-09-26 PROCEDURE — 93005 ELECTROCARDIOGRAM TRACING: CPT

## 2022-09-26 PROCEDURE — 85610 PROTHROMBIN TIME: CPT

## 2022-09-26 PROCEDURE — 93010 ELECTROCARDIOGRAM REPORT: CPT | Performed by: FAMILY MEDICINE

## 2022-09-26 PROCEDURE — 85730 THROMBOPLASTIN TIME PARTIAL: CPT

## 2022-09-26 PROCEDURE — 3079F DIAST BP 80-89 MM HG: CPT | Performed by: FAMILY MEDICINE

## 2022-09-26 PROCEDURE — 99214 OFFICE O/P EST MOD 30 MIN: CPT | Performed by: FAMILY MEDICINE

## 2022-09-26 PROCEDURE — 85025 COMPLETE CBC W/AUTO DIFF WBC: CPT

## 2022-09-26 PROCEDURE — 3074F SYST BP LT 130 MM HG: CPT | Performed by: FAMILY MEDICINE

## 2022-09-26 PROCEDURE — 36415 COLL VENOUS BLD VENIPUNCTURE: CPT

## 2022-09-26 PROCEDURE — 71046 X-RAY EXAM CHEST 2 VIEWS: CPT | Performed by: FAMILY MEDICINE

## 2022-09-26 PROCEDURE — 3008F BODY MASS INDEX DOCD: CPT | Performed by: FAMILY MEDICINE

## 2022-09-26 PROCEDURE — 80053 COMPREHEN METABOLIC PANEL: CPT

## 2022-09-26 RX ORDER — VALACYCLOVIR HYDROCHLORIDE 1 G/1
TABLET, FILM COATED ORAL
Qty: 20 TABLET | Refills: 5 | Status: SHIPPED | OUTPATIENT
Start: 2022-09-26

## 2022-10-03 ENCOUNTER — TELEPHONE (OUTPATIENT)
Dept: FAMILY MEDICINE CLINIC | Facility: CLINIC | Age: 57
End: 2022-10-03

## 2022-10-03 DIAGNOSIS — Z01.818 PREOPERATIVE TESTING: Primary | ICD-10-CM

## 2022-10-03 NOTE — TELEPHONE ENCOUNTER
Dr. Narda Prieto - if Gyne Onc cannot order the CA-125 (blood draw), can you order it for patient? Scheduled for Surgery 10/11/22. Patient calling office. Patient's date of birth and full name both confirmed. She is clarifying the issues with the pre-op testing. Per patient, Gynecology Oncology said they ordered blood work earlier today. Remaining blood work:  (blood Test per patient)    But patient tried to schedule, and hospital says they do not have the order. RN encouraged patient to reach out to [address] [place] [address] again to request that order be placed. She verbalizes understanding. But she is asking if Dr. Narda Prieto can order it if needed. Also wants to point out that the office of [address] [place] [address] gave her the wrong orders - they ordered PT/PTT, but she didn't need that per their office.          Future Appointments   Date Time Provider Myra Marinelli   11/11/2022  2:30 PM Brenda Dennis MD [facility]

## 2022-10-03 NOTE — TELEPHONE ENCOUNTER
Patient is calling to advise Dr all her tests was not ordered due to her having surgery on 10/11/22.  The following lab order is still needed:    CA-125

## 2022-10-04 ENCOUNTER — LAB ENCOUNTER (OUTPATIENT)
Dept: LAB | Facility: HOSPITAL | Age: 57
End: 2022-10-04
Attending: FAMILY MEDICINE
Payer: COMMERCIAL

## 2022-10-04 DIAGNOSIS — Z01.818 PREOPERATIVE TESTING: ICD-10-CM

## 2022-10-04 LAB — CANCER AG125 SERPL-ACNC: 4 U/ML (ref ?–35)

## 2022-10-04 PROCEDURE — 36415 COLL VENOUS BLD VENIPUNCTURE: CPT

## 2022-10-04 PROCEDURE — 86304 IMMUNOASSAY TUMOR CA 125: CPT

## 2022-10-05 ENCOUNTER — TELEPHONE (OUTPATIENT)
Dept: FAMILY MEDICINE CLINIC | Facility: CLINIC | Age: 57
End: 2022-10-05

## 2022-10-06 ENCOUNTER — MED REC SCAN ONLY (OUTPATIENT)
Dept: FAMILY MEDICINE CLINIC | Facility: CLINIC | Age: 57
End: 2022-10-06

## 2022-10-07 RX ORDER — ZINC SULFATE 50(220)MG
220 CAPSULE ORAL DAILY
COMMUNITY

## 2022-10-07 RX ORDER — ASCORBIC ACID 500 MG
500 TABLET ORAL DAILY
COMMUNITY

## 2022-10-09 ENCOUNTER — LAB ENCOUNTER (OUTPATIENT)
Dept: LAB | Facility: HOSPITAL | Age: 57
End: 2022-10-09
Attending: OBSTETRICS & GYNECOLOGY
Payer: COMMERCIAL

## 2022-10-09 DIAGNOSIS — Z01.818 PREOP TESTING: ICD-10-CM

## 2022-10-09 LAB
ANTIBODY SCREEN: NEGATIVE
RH BLOOD TYPE: POSITIVE
RH BLOOD TYPE: POSITIVE

## 2022-10-09 PROCEDURE — 86901 BLOOD TYPING SEROLOGIC RH(D): CPT

## 2022-10-09 PROCEDURE — 86900 BLOOD TYPING SEROLOGIC ABO: CPT

## 2022-10-09 PROCEDURE — 86850 RBC ANTIBODY SCREEN: CPT

## 2022-10-09 PROCEDURE — 36415 COLL VENOUS BLD VENIPUNCTURE: CPT

## 2022-10-10 LAB — SARS-COV-2 RNA RESP QL NAA+PROBE: NOT DETECTED

## 2022-10-10 NOTE — TELEPHONE ENCOUNTER
Message noted. Thank you! The nurses and I were reviewing to find information  Would you be ok with reaching out to the school upon your return or would you like us to have the patient scheduled to have a visit with you?

## 2022-10-11 ENCOUNTER — HOSPITAL ENCOUNTER (OUTPATIENT)
Facility: HOSPITAL | Age: 57
Setting detail: HOSPITAL OUTPATIENT SURGERY
Discharge: HOME | End: 2022-10-11
Attending: OBSTETRICS & GYNECOLOGY | Admitting: OBSTETRICS & GYNECOLOGY
Payer: COMMERCIAL

## 2022-10-11 ENCOUNTER — ANESTHESIA EVENT (OUTPATIENT)
Dept: SURGERY | Facility: HOSPITAL | Age: 57
End: 2022-10-11
Payer: COMMERCIAL

## 2022-10-11 ENCOUNTER — ANESTHESIA (OUTPATIENT)
Dept: SURGERY | Facility: HOSPITAL | Age: 57
End: 2022-10-11
Payer: COMMERCIAL

## 2022-10-11 VITALS
DIASTOLIC BLOOD PRESSURE: 54 MMHG | OXYGEN SATURATION: 99 % | BODY MASS INDEX: 23.92 KG/M2 | TEMPERATURE: 98 F | WEIGHT: 148.81 LBS | HEIGHT: 66 IN | HEART RATE: 79 BPM | SYSTOLIC BLOOD PRESSURE: 99 MMHG | RESPIRATION RATE: 18 BRPM

## 2022-10-11 DIAGNOSIS — Z01.818 PREOP TESTING: Primary | ICD-10-CM

## 2022-10-11 PROCEDURE — 88112 CYTOPATH CELL ENHANCE TECH: CPT | Performed by: OBSTETRICS & GYNECOLOGY

## 2022-10-11 PROCEDURE — 0UT74ZZ RESECTION OF BILATERAL FALLOPIAN TUBES, PERCUTANEOUS ENDOSCOPIC APPROACH: ICD-10-PCS | Performed by: OBSTETRICS & GYNECOLOGY

## 2022-10-11 PROCEDURE — 0UT94ZZ RESECTION OF UTERUS, PERCUTANEOUS ENDOSCOPIC APPROACH: ICD-10-PCS | Performed by: OBSTETRICS & GYNECOLOGY

## 2022-10-11 PROCEDURE — 88307 TISSUE EXAM BY PATHOLOGIST: CPT | Performed by: OBSTETRICS & GYNECOLOGY

## 2022-10-11 PROCEDURE — S0028 INJECTION, FAMOTIDINE, 20 MG: HCPCS

## 2022-10-11 PROCEDURE — 88309 TISSUE EXAM BY PATHOLOGIST: CPT | Performed by: OBSTETRICS & GYNECOLOGY

## 2022-10-11 PROCEDURE — 0UT24ZZ RESECTION OF BILATERAL OVARIES, PERCUTANEOUS ENDOSCOPIC APPROACH: ICD-10-PCS | Performed by: OBSTETRICS & GYNECOLOGY

## 2022-10-11 PROCEDURE — 8E0W4CZ ROBOTIC ASSISTED PROCEDURE OF TRUNK REGION, PERCUTANEOUS ENDOSCOPIC APPROACH: ICD-10-PCS | Performed by: OBSTETRICS & GYNECOLOGY

## 2022-10-11 RX ORDER — MAGNESIUM SULFATE HEPTAHYDRATE 500 MG/ML
INJECTION, SOLUTION INTRAMUSCULAR; INTRAVENOUS AS NEEDED
Status: DISCONTINUED | OUTPATIENT
Start: 2022-10-11 | End: 2022-10-11 | Stop reason: SURG

## 2022-10-11 RX ORDER — HYDROCODONE BITARTRATE AND ACETAMINOPHEN 5; 325 MG/1; MG/1
1-2 TABLET ORAL EVERY 4 HOURS PRN
Refills: 0 | Status: SHIPPED | COMMUNITY
Start: 2022-10-11

## 2022-10-11 RX ORDER — MORPHINE SULFATE 10 MG/ML
6 INJECTION, SOLUTION INTRAMUSCULAR; INTRAVENOUS EVERY 10 MIN PRN
Status: DISCONTINUED | OUTPATIENT
Start: 2022-10-11 | End: 2022-10-11

## 2022-10-11 RX ORDER — DEXAMETHASONE SODIUM PHOSPHATE 4 MG/ML
VIAL (ML) INJECTION AS NEEDED
Status: DISCONTINUED | OUTPATIENT
Start: 2022-10-11 | End: 2022-10-11 | Stop reason: SURG

## 2022-10-11 RX ORDER — MORPHINE SULFATE 4 MG/ML
4 INJECTION, SOLUTION INTRAMUSCULAR; INTRAVENOUS EVERY 10 MIN PRN
Status: DISCONTINUED | OUTPATIENT
Start: 2022-10-11 | End: 2022-10-11

## 2022-10-11 RX ORDER — MORPHINE SULFATE 4 MG/ML
2 INJECTION, SOLUTION INTRAMUSCULAR; INTRAVENOUS EVERY 10 MIN PRN
Status: DISCONTINUED | OUTPATIENT
Start: 2022-10-11 | End: 2022-10-11

## 2022-10-11 RX ORDER — SCOLOPAMINE TRANSDERMAL SYSTEM 1 MG/1
1 PATCH, EXTENDED RELEASE TRANSDERMAL
Status: DISCONTINUED | OUTPATIENT
Start: 2022-10-11 | End: 2022-10-14 | Stop reason: HOSPADM

## 2022-10-11 RX ORDER — HYDROMORPHONE HYDROCHLORIDE 1 MG/ML
0.6 INJECTION, SOLUTION INTRAMUSCULAR; INTRAVENOUS; SUBCUTANEOUS EVERY 5 MIN PRN
Status: DISCONTINUED | OUTPATIENT
Start: 2022-10-11 | End: 2022-10-11

## 2022-10-11 RX ORDER — SODIUM CHLORIDE, SODIUM LACTATE, POTASSIUM CHLORIDE, CALCIUM CHLORIDE 600; 310; 30; 20 MG/100ML; MG/100ML; MG/100ML; MG/100ML
INJECTION, SOLUTION INTRAVENOUS CONTINUOUS
OUTPATIENT
Start: 2022-10-11

## 2022-10-11 RX ORDER — ROCURONIUM BROMIDE 10 MG/ML
INJECTION, SOLUTION INTRAVENOUS AS NEEDED
Status: DISCONTINUED | OUTPATIENT
Start: 2022-10-11 | End: 2022-10-11 | Stop reason: SURG

## 2022-10-11 RX ORDER — GABAPENTIN 100 MG/1
200 CAPSULE ORAL ONCE
OUTPATIENT
Start: 2022-10-11

## 2022-10-11 RX ORDER — PHENYLEPHRINE HCL 10 MG/ML
VIAL (ML) INJECTION AS NEEDED
Status: DISCONTINUED | OUTPATIENT
Start: 2022-10-11 | End: 2022-10-11 | Stop reason: SURG

## 2022-10-11 RX ORDER — KETOROLAC TROMETHAMINE 30 MG/ML
INJECTION, SOLUTION INTRAMUSCULAR; INTRAVENOUS AS NEEDED
Status: DISCONTINUED | OUTPATIENT
Start: 2022-10-11 | End: 2022-10-11 | Stop reason: SURG

## 2022-10-11 RX ORDER — FAMOTIDINE 10 MG/ML
20 INJECTION, SOLUTION INTRAVENOUS ONCE
Status: COMPLETED | OUTPATIENT
Start: 2022-10-11 | End: 2022-10-11

## 2022-10-11 RX ORDER — KETAMINE HYDROCHLORIDE 50 MG/ML
INJECTION, SOLUTION, CONCENTRATE INTRAMUSCULAR; INTRAVENOUS AS NEEDED
Status: DISCONTINUED | OUTPATIENT
Start: 2022-10-11 | End: 2022-10-11 | Stop reason: SURG

## 2022-10-11 RX ORDER — POLYETHYLENE GLYCOL 3350 17 G/17G
17 POWDER, FOR SOLUTION ORAL DAILY PRN
Refills: 0 | Status: SHIPPED | COMMUNITY
Start: 2022-10-11

## 2022-10-11 RX ORDER — PROCHLORPERAZINE EDISYLATE 5 MG/ML
5 INJECTION INTRAMUSCULAR; INTRAVENOUS EVERY 8 HOURS PRN
OUTPATIENT
Start: 2022-10-11

## 2022-10-11 RX ORDER — ONDANSETRON 2 MG/ML
4 INJECTION INTRAMUSCULAR; INTRAVENOUS EVERY 6 HOURS PRN
OUTPATIENT
Start: 2022-10-11

## 2022-10-11 RX ORDER — SODIUM CHLORIDE, SODIUM LACTATE, POTASSIUM CHLORIDE, CALCIUM CHLORIDE 600; 310; 30; 20 MG/100ML; MG/100ML; MG/100ML; MG/100ML
INJECTION, SOLUTION INTRAVENOUS CONTINUOUS
Status: DISCONTINUED | OUTPATIENT
Start: 2022-10-11 | End: 2022-10-11

## 2022-10-11 RX ORDER — ONDANSETRON 2 MG/ML
INJECTION INTRAMUSCULAR; INTRAVENOUS AS NEEDED
Status: DISCONTINUED | OUTPATIENT
Start: 2022-10-11 | End: 2022-10-11 | Stop reason: SURG

## 2022-10-11 RX ORDER — ACETAMINOPHEN 500 MG
1000 TABLET ORAL ONCE
Status: COMPLETED | OUTPATIENT
Start: 2022-10-11 | End: 2022-10-11

## 2022-10-11 RX ORDER — BUPIVACAINE HYDROCHLORIDE 2.5 MG/ML
INJECTION, SOLUTION EPIDURAL; INFILTRATION; INTRACAUDAL AS NEEDED
Status: DISCONTINUED | OUTPATIENT
Start: 2022-10-11 | End: 2022-10-11 | Stop reason: HOSPADM

## 2022-10-11 RX ORDER — IBUPROFEN 600 MG/1
600 TABLET ORAL EVERY 8 HOURS PRN
Refills: 0 | Status: SHIPPED | COMMUNITY
Start: 2022-10-11

## 2022-10-11 RX ORDER — HYDROMORPHONE HYDROCHLORIDE 1 MG/ML
0.4 INJECTION, SOLUTION INTRAMUSCULAR; INTRAVENOUS; SUBCUTANEOUS EVERY 5 MIN PRN
Status: DISCONTINUED | OUTPATIENT
Start: 2022-10-11 | End: 2022-10-11

## 2022-10-11 RX ORDER — SODIUM CHLORIDE 9 MG/ML
INJECTION, SOLUTION INTRAVENOUS CONTINUOUS PRN
Status: DISCONTINUED | OUTPATIENT
Start: 2022-10-11 | End: 2022-10-11 | Stop reason: SURG

## 2022-10-11 RX ORDER — HYDROMORPHONE HYDROCHLORIDE 1 MG/ML
0.2 INJECTION, SOLUTION INTRAMUSCULAR; INTRAVENOUS; SUBCUTANEOUS EVERY 5 MIN PRN
Status: DISCONTINUED | OUTPATIENT
Start: 2022-10-11 | End: 2022-10-11

## 2022-10-11 RX ORDER — CEFAZOLIN SODIUM/WATER 2 G/20 ML
2 SYRINGE (ML) INTRAVENOUS ONCE
Status: COMPLETED | OUTPATIENT
Start: 2022-10-11 | End: 2022-10-11

## 2022-10-11 RX ORDER — HYDROCODONE BITARTRATE AND ACETAMINOPHEN 5; 325 MG/1; MG/1
1 TABLET ORAL EVERY 4 HOURS PRN
Status: DISCONTINUED | OUTPATIENT
Start: 2022-10-11 | End: 2022-10-11

## 2022-10-11 RX ORDER — ACETAMINOPHEN 325 MG/1
650 TABLET ORAL EVERY 6 HOURS
OUTPATIENT
Start: 2022-10-11

## 2022-10-11 RX ORDER — NALOXONE HYDROCHLORIDE 0.4 MG/ML
80 INJECTION, SOLUTION INTRAMUSCULAR; INTRAVENOUS; SUBCUTANEOUS AS NEEDED
Status: DISCONTINUED | OUTPATIENT
Start: 2022-10-11 | End: 2022-10-11

## 2022-10-11 RX ORDER — LIDOCAINE HYDROCHLORIDE 10 MG/ML
INJECTION, SOLUTION EPIDURAL; INFILTRATION; INTRACAUDAL; PERINEURAL AS NEEDED
Status: DISCONTINUED | OUTPATIENT
Start: 2022-10-11 | End: 2022-10-11 | Stop reason: SURG

## 2022-10-11 RX ORDER — PROCHLORPERAZINE EDISYLATE 5 MG/ML
5 INJECTION INTRAMUSCULAR; INTRAVENOUS EVERY 8 HOURS PRN
Status: DISCONTINUED | OUTPATIENT
Start: 2022-10-11 | End: 2022-10-11

## 2022-10-11 RX ORDER — SIMETHICONE 80 MG
80 TABLET,CHEWABLE ORAL 4 TIMES DAILY PRN
OUTPATIENT
Start: 2022-10-11

## 2022-10-11 RX ORDER — DOCUSATE SODIUM 100 MG/1
100 CAPSULE, LIQUID FILLED ORAL 2 TIMES DAILY
Refills: 0 | Status: SHIPPED | COMMUNITY
Start: 2022-10-11

## 2022-10-11 RX ORDER — CALCIUM CARBONATE 200(500)MG
500 TABLET,CHEWABLE ORAL 3 TIMES DAILY PRN
OUTPATIENT
Start: 2022-10-11

## 2022-10-11 RX ORDER — ONDANSETRON 2 MG/ML
4 INJECTION INTRAMUSCULAR; INTRAVENOUS EVERY 6 HOURS PRN
Status: DISCONTINUED | OUTPATIENT
Start: 2022-10-11 | End: 2022-10-11

## 2022-10-11 RX ADMIN — CEFAZOLIN SODIUM/WATER 2 G: 2 G/20 ML SYRINGE (ML) INTRAVENOUS at 08:03:00

## 2022-10-11 RX ADMIN — LIDOCAINE HYDROCHLORIDE 10 MG: 10 INJECTION, SOLUTION EPIDURAL; INFILTRATION; INTRACAUDAL; PERINEURAL at 07:41:00

## 2022-10-11 RX ADMIN — ROCURONIUM BROMIDE 10 MG: 10 INJECTION, SOLUTION INTRAVENOUS at 09:09:00

## 2022-10-11 RX ADMIN — ONDANSETRON 4 MG: 2 INJECTION INTRAMUSCULAR; INTRAVENOUS at 09:14:00

## 2022-10-11 RX ADMIN — KETAMINE HYDROCHLORIDE 30 MG: 50 INJECTION, SOLUTION, CONCENTRATE INTRAMUSCULAR; INTRAVENOUS at 08:13:00

## 2022-10-11 RX ADMIN — PHENYLEPHRINE HCL 150 MCG: 10 MG/ML VIAL (ML) INJECTION at 08:34:00

## 2022-10-11 RX ADMIN — SODIUM CHLORIDE, SODIUM LACTATE, POTASSIUM CHLORIDE, CALCIUM CHLORIDE: 600; 310; 30; 20 INJECTION, SOLUTION INTRAVENOUS at 07:40:00

## 2022-10-11 RX ADMIN — SODIUM CHLORIDE, SODIUM LACTATE, POTASSIUM CHLORIDE, CALCIUM CHLORIDE: 600; 310; 30; 20 INJECTION, SOLUTION INTRAVENOUS at 08:43:00

## 2022-10-11 RX ADMIN — SODIUM CHLORIDE, SODIUM LACTATE, POTASSIUM CHLORIDE, CALCIUM CHLORIDE: 600; 310; 30; 20 INJECTION, SOLUTION INTRAVENOUS at 09:21:00

## 2022-10-11 RX ADMIN — PHENYLEPHRINE HCL 200 MCG: 10 MG/ML VIAL (ML) INJECTION at 09:01:00

## 2022-10-11 RX ADMIN — PHENYLEPHRINE HCL 150 MCG: 10 MG/ML VIAL (ML) INJECTION at 08:56:00

## 2022-10-11 RX ADMIN — SODIUM CHLORIDE: 9 INJECTION, SOLUTION INTRAVENOUS at 08:43:00

## 2022-10-11 RX ADMIN — ROCURONIUM BROMIDE 50 MG: 10 INJECTION, SOLUTION INTRAVENOUS at 07:50:00

## 2022-10-11 RX ADMIN — PHENYLEPHRINE HCL 100 MCG: 10 MG/ML VIAL (ML) INJECTION at 08:17:00

## 2022-10-11 RX ADMIN — KETAMINE HYDROCHLORIDE 10 MG: 50 INJECTION, SOLUTION, CONCENTRATE INTRAMUSCULAR; INTRAVENOUS at 09:13:00

## 2022-10-11 RX ADMIN — MAGNESIUM SULFATE HEPTAHYDRATE 0.1 G: 500 INJECTION, SOLUTION INTRAMUSCULAR; INTRAVENOUS at 09:13:00

## 2022-10-11 RX ADMIN — PHENYLEPHRINE HCL 100 MCG: 10 MG/ML VIAL (ML) INJECTION at 08:29:00

## 2022-10-11 RX ADMIN — MAGNESIUM SULFATE HEPTAHYDRATE 0.25 G: 500 INJECTION, SOLUTION INTRAMUSCULAR; INTRAVENOUS at 07:48:00

## 2022-10-11 RX ADMIN — KETOROLAC TROMETHAMINE 15 MG: 30 INJECTION, SOLUTION INTRAMUSCULAR; INTRAVENOUS at 09:24:00

## 2022-10-11 RX ADMIN — DEXAMETHASONE SODIUM PHOSPHATE 8 MG: 4 MG/ML VIAL (ML) INJECTION at 08:03:00

## 2022-10-11 RX ADMIN — PHENYLEPHRINE HCL 100 MCG: 10 MG/ML VIAL (ML) INJECTION at 09:36:00

## 2022-10-11 RX ADMIN — MAGNESIUM SULFATE HEPTAHYDRATE 0.25 G: 500 INJECTION, SOLUTION INTRAMUSCULAR; INTRAVENOUS at 07:56:00

## 2022-10-11 RX ADMIN — SODIUM CHLORIDE: 9 INJECTION, SOLUTION INTRAVENOUS at 07:55:00

## 2022-10-11 NOTE — OPERATIVE REPORT
Preoperative Diagnoses: cervical dysplasia, abnormal pap smear of cervix, BRCA 2 mutation, personal history of breast cancer  Postoperative diagnoses: same as above    Surgeon: Aletha Jones MD  Assistant : Meredith Lieberman CSA. She was required for opening/closing and bedside assist    Procedure: robotic assisted total hysterectomy, bilateral salpingo-oophorectomy, washings    Anesthesia: GETA  EBL: 58BV  Complications: none immediately apparent  Specimens: washings, uterus/cervix/bilateral tubes and ovaries    Findings: normal upper abdominal survey, normal appearing omentum. Smooth surface of liver, normal appearing tubes and ovaries, normal sized uterus. No gross evidence of malignancy    Procedure In Detail: The patient was taken to the operating room and  bilateral sequential compression devices were placed and were operational.  The  patient received appropriate preoperative antibiotics. The patient was placed  under satisfactory general endotracheal anesthesia. The patient was prepped and  draped in the normal sterile fashion in the dorsal lithotomy position with care  taken to appropriately pad and position all extremities. A Lam catheter and  medium-sized VCare uterine manipulator were then placed without difficulties. Gloves and gowns were changed. Attention was turned to the abdomen where a  small stab incision was made in the base of the umbilicus. The umbilicus was  tented up and the Veress needle was inserted intraperitoneally. Abdominal  insufflation was begun with carbon dioxide gas. The robotic trocars were then  placed in the standard fashion with one 8-mm supraumbilical camera port and  three 8 mm trocars, 1 in the right upper quadrant, 1 in the left upper quadrant,  and 1 in the left lower quadrant. These were all placed under direct  visualization.   An additional 5 mm assistant port was placed in the high left  upper quadrant of the abdomen after ensuring that an OG tube was placed and was  operational. MicroCoal system was used  as well as operating pressures of 12mmhg throughout the case. A survey of the abdomen and pelvis was performed with the above-  noted findings. The patient was placed in Trendelenburg and the bowel was swept  from the pelvis. Pelvic washings were obtained. At this  point, decision was made to proceed with a robotic-assisted approach and the da  Anita surgical system was docked to the patient. The bilateral round ligaments  were coagulated and transected using a combination of monopolar and bipolar  cautery. The bilateral retroperitoneal spaces were entered and developed  allowing for identification of the bilateral iliac vessels as well as the  bilateral ureters. The left followed by the right IP ligaments were  skeletonized and subsequently coagulated and transected using the LigaSure  device after visualizing the ureters well below. The posterior leaves of the  broad ligaments were taken down bilaterally. The anterior leaves of the broad  ligaments were taken down bilaterally and the bladder flap was created. The  bladder was dissected down to below the level of the cervical vaginal junction. The bilateral uterine vessels were skeletonized and subsequently coagulated and  transected using the LigaSure device. The bilateral cardinal and uterosacral  ligaments were taken down using a combination of monopolar and bipolar cautery  down to the level of the cup of the uterine manipulator. A colpotomy was  performed using monopolar cautery and the uterus, cervix, tubes and ovaries were  removed through the vagina and sent for permanent pathology. The vaginal cuff  was closed using #0 V-Loc suture in a running fashion. There was some oozing from the left uterine vein which was controlled at that left cuff angle with 2-0 vloc. At this point, the pressure in the abdomen was turned down  to low. Copious irrigation was performed.   All pedicles were inspected and  noted to be hemostatic. Arlyss Piggs was placed over the vaginal cuff for additional hemostasis. At this point,  decision was made to conclude the case. All robotic instruments were removed  and the robot was undocked. The pneumoperitoneum was  released using the airseal and all trocars were removed. All skin incisions were closed using a  combination of 4-0 Monocryl and Dermabond skin glue. Marcaine 0.25% was placed  for local anesthesia. A Lam catheter was removed and the vaginal cuff was  inspected from below and noted to be hemostatic, and there were no vaginal  abrasions or lacerations. The patient was taken to Recovery in stable  condition. All sponge, lap, and needle counts were correct x2.

## 2022-10-11 NOTE — ANESTHESIA PROCEDURE NOTES
Peripheral IV  Date/Time: 10/11/2022 7:57 AM  Inserted by: Sukhjinder North CRNA    Placement  Needle size: 20 G  Laterality: right  Location: hand  Local anesthetic: none  Site prep: alcohol  Technique: anatomical landmarks  Attempts: 1

## 2022-10-11 NOTE — ANESTHESIA PROCEDURE NOTES
Airway  Date/Time: 10/11/2022 7:52 AM  Urgency: Elective      General Information and Staff    Patient location during procedure: OR  Anesthesiologist: Sinai Stoner MD  Resident/CRNA: Nancy Gomez CRNA  Performed: CRNA     Indications and Patient Condition  Indications for airway management: anesthesia  Sedation level: deep  Preoxygenated: yes  Patient position: sniffing  Mask difficulty assessment: 1 - vent by mask    Final Airway Details  Final airway type: endotracheal airway      Successful airway: ETT  Cuffed: yes   Successful intubation technique: direct laryngoscopy  Endotracheal tube insertion site: oral  Blade: Lou  Blade size: #3  ETT size (mm): 6.5    Cormack-Lehane Classification: grade I - full view of glottis  Placement verified by: chest auscultation and capnometry   Cuff volume (mL): 6  Measured from: teeth  ETT to teeth (cm): 21  Number of attempts at approach: 1    Additional Comments  Atraumatic, dentition and soft structures as preop. Soft bite block Left molars, tongue midline.

## 2022-10-11 NOTE — INTERVAL H&P NOTE
Pre-op Diagnosis: Genetic susceptibility to malignant neoplasm, personal hystory of cervical dysplesia    The above referenced H&P was reviewed by Erinn Menendez MD on 10/11/2022, the patient was examined and no significant changes have occurred in the patient's condition since the H&P was performed. I discussed with the patient and/or legal representative the potential benefits, risks and side effects of this procedure; the likelihood of the patient achieving goals; and potential problems that might occur during recuperation. I discussed reasonable alternatives to the procedure, including risks, benefits and side effects related to the alternatives and risks related to not receiving this procedure. We will proceed with procedure as planned.

## 2022-10-25 ENCOUNTER — LAB REQUISITION (OUTPATIENT)
Dept: LAB | Age: 57
End: 2022-10-25

## 2022-10-25 DIAGNOSIS — R30.0 DYSURIA: ICD-10-CM

## 2022-10-25 PROCEDURE — 87186 SC STD MICRODIL/AGAR DIL: CPT | Performed by: CLINICAL MEDICAL LABORATORY

## 2022-10-25 PROCEDURE — 87086 URINE CULTURE/COLONY COUNT: CPT | Performed by: CLINICAL MEDICAL LABORATORY

## 2022-10-25 PROCEDURE — 87077 CULTURE AEROBIC IDENTIFY: CPT | Performed by: CLINICAL MEDICAL LABORATORY

## 2022-10-27 LAB — BACTERIA UR CULT: ABNORMAL

## 2022-11-21 NOTE — PROGRESS NOTES
Dileep Young is a 59year old female who presents today for a yearly follow-up. She has a history of bilateral nipple sparing mastectomy and reconstruction with SSX-615 cc implants in March 2021. She is without new complaints. Specifically, she denies any masses, skin changes, or nipple discharge. Physical Examination:  There were no vitals filed for this visit. HEENT: There is no cervical or supraclavicular lymphadenopathy noted. Breasts: Bilateral breasts are soft without palpable masses. There is no nipple inversion or nipple discharge noted. There is no axillary lymphadenopathy noted bilaterally. There is no erythema or seroma noted. Assessment and Plan:  Patient is doing well. We reviewed the recommended FDA surveillance protocol for silicone implants. We discussed continuing regular self breast examination with a plan for follow-up in 1 year or sooner if any changes are detected. The plan was reviewed with the patient and questions were answered.

## 2022-11-22 ENCOUNTER — OFFICE VISIT (OUTPATIENT)
Dept: SURGERY | Facility: CLINIC | Age: 57
End: 2022-11-22
Payer: COMMERCIAL

## 2022-11-22 DIAGNOSIS — Z90.13 ABSENCE OF BREAST, BILATERAL: Primary | ICD-10-CM

## 2022-11-22 PROCEDURE — 99212 OFFICE O/P EST SF 10 MIN: CPT | Performed by: SURGERY

## 2022-11-30 ENCOUNTER — MED REC SCAN ONLY (OUTPATIENT)
Dept: FAMILY MEDICINE CLINIC | Facility: CLINIC | Age: 57
End: 2022-11-30

## 2022-12-14 ENCOUNTER — OFFICE VISIT (OUTPATIENT)
Dept: FAMILY MEDICINE CLINIC | Facility: CLINIC | Age: 57
End: 2022-12-14
Payer: OTHER MISCELLANEOUS

## 2022-12-14 ENCOUNTER — NURSE TRIAGE (OUTPATIENT)
Dept: FAMILY MEDICINE CLINIC | Facility: CLINIC | Age: 57
End: 2022-12-14

## 2022-12-14 VITALS
DIASTOLIC BLOOD PRESSURE: 90 MMHG | RESPIRATION RATE: 18 BRPM | WEIGHT: 150 LBS | TEMPERATURE: 99 F | BODY MASS INDEX: 24.99 KG/M2 | SYSTOLIC BLOOD PRESSURE: 130 MMHG | HEIGHT: 65 IN | HEART RATE: 69 BPM | OXYGEN SATURATION: 98 %
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
## 2022-12-14 DIAGNOSIS — M25.562 ACUTE PAIN OF LEFT KNEE: ICD-10-CM

## 2022-12-14 DIAGNOSIS — M54.2 NECK PAIN: ICD-10-CM

## 2022-12-14 DIAGNOSIS — M25.512 ACUTE PAIN OF LEFT SHOULDER: ICD-10-CM

## 2022-12-14 DIAGNOSIS — S06.0XAA CONCUSSION WITH UNKNOWN LOSS OF CONSCIOUSNESS STATUS, INITIAL ENCOUNTER: Primary | ICD-10-CM

## 2022-12-14 PROCEDURE — 3080F DIAST BP >= 90 MM HG: CPT

## 2022-12-14 PROCEDURE — 3008F BODY MASS INDEX DOCD: CPT

## 2022-12-14 PROCEDURE — 3075F SYST BP GE 130 - 139MM HG: CPT

## 2022-12-14 PROCEDURE — 99213 OFFICE O/P EST LOW 20 MIN: CPT

## 2022-12-14 RX ORDER — VENLAFAXINE HYDROCHLORIDE 37.5 MG/1
37.5 CAPSULE, EXTENDED RELEASE ORAL DAILY
COMMUNITY
Start: 2022-12-07

## 2022-12-14 RX ORDER — CEPHALEXIN 500 MG/1
500 CAPSULE ORAL 4 TIMES DAILY
COMMUNITY
Start: 2022-10-26 | End: 2022-12-14 | Stop reason: ALTCHOICE

## 2022-12-14 RX ORDER — SULFAMETHOXAZOLE AND TRIMETHOPRIM 800; 160 MG/1; MG/1
1 TABLET ORAL EVERY 12 HOURS
COMMUNITY
Start: 2022-10-21 | End: 2022-12-14 | Stop reason: ALTCHOICE

## 2022-12-14 RX ORDER — CYCLOBENZAPRINE HCL 5 MG
5 TABLET ORAL 3 TIMES DAILY PRN
Qty: 15 TABLET | Refills: 0 | Status: SHIPPED | OUTPATIENT
Start: 2022-12-14

## 2022-12-14 RX ORDER — ONDANSETRON 4 MG/1
4 TABLET, ORALLY DISINTEGRATING ORAL EVERY 8 HOURS PRN
Qty: 15 TABLET | Refills: 0 | Status: SHIPPED | OUTPATIENT
Start: 2022-12-14

## 2022-12-14 RX ORDER — VALACYCLOVIR HYDROCHLORIDE 1 G/1
1000 TABLET, FILM COATED ORAL 2 TIMES DAILY
COMMUNITY
Start: 2022-11-25 | End: 2022-12-14 | Stop reason: ALTCHOICE

## 2022-12-15 ENCOUNTER — HOSPITAL ENCOUNTER (OUTPATIENT)
Dept: GENERAL RADIOLOGY | Facility: HOSPITAL | Age: 57
Discharge: HOME | End: 2022-12-15
Payer: OTHER MISCELLANEOUS

## 2022-12-15 DIAGNOSIS — M54.2 NECK PAIN: ICD-10-CM
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
## 2022-12-15 DIAGNOSIS — M25.512 ACUTE PAIN OF LEFT SHOULDER: ICD-10-CM

## 2022-12-15 DIAGNOSIS — M25.562 ACUTE PAIN OF LEFT KNEE: ICD-10-CM

## 2022-12-15 PROCEDURE — 72050 X-RAY EXAM NECK SPINE 4/5VWS: CPT

## 2022-12-15 PROCEDURE — 73562 X-RAY EXAM OF KNEE 3: CPT

## 2022-12-15 PROCEDURE — 73030 X-RAY EXAM OF SHOULDER: CPT

## 2022-12-16 ENCOUNTER — TELEPHONE (OUTPATIENT)
Dept: FAMILY MEDICINE CLINIC | Facility: CLINIC | Age: 57
End: 2022-12-16

## 2022-12-16 NOTE — TELEPHONE ENCOUNTER
Informed patient per provider notes: Left knee x-ray is normal. No abnormality noted. Patient verbalized understanding of results.

## 2022-12-16 NOTE — TELEPHONE ENCOUNTER
----- Message from BRENT Bray sent at 12/15/2022  8:15 PM CST -----  Left knee x-ray is normal. No abnormality noted.

## 2023-05-27 ENCOUNTER — TELEPHONE (OUTPATIENT)
Dept: FAMILY MEDICINE CLINIC | Facility: CLINIC | Age: 58
End: 2023-05-27

## 2023-05-27 RX ORDER — BENZONATATE 200 MG/1
200 CAPSULE ORAL 3 TIMES DAILY PRN
Qty: 30 CAPSULE | Refills: 0 | Status: SHIPPED | OUTPATIENT
Start: 2023-05-27

## 2023-05-27 RX ORDER — AMOXICILLIN AND CLAVULANATE POTASSIUM 875; 125 MG/1; MG/1
1 TABLET, FILM COATED ORAL 2 TIMES DAILY
Qty: 14 TABLET | Refills: 0 | Status: SHIPPED | OUTPATIENT
Start: 2023-05-27 | End: 2023-06-03

## 2023-05-27 NOTE — TELEPHONE ENCOUNTER
On call  Patient reports 5 days history of sore throat, headache, cough with mild sputum production, states today cough got worse, sometimes she feels she is doing better but then she felt worse. Patient reports she has not had any fever, no SOB, reports positive nasal congestion,  has sinus pressure as well, has used netipot with no response. Explained to patient she appears to be presenting with sinus infection, discussed symptomatic treatment, increase water intake, continue using Harmony pot, recommended to use humidifier in her room, will send prescription to start antibiotic as well as benzonatate for cough. All questions answered.

## 2023-07-12 ENCOUNTER — OFFICE VISIT (OUTPATIENT)
Dept: FAMILY MEDICINE CLINIC | Facility: CLINIC | Age: 58
End: 2023-07-12

## 2023-07-12 VITALS
SYSTOLIC BLOOD PRESSURE: 119 MMHG | TEMPERATURE: 98 F | HEART RATE: 76 BPM | DIASTOLIC BLOOD PRESSURE: 80 MMHG | HEIGHT: 65 IN | WEIGHT: 160 LBS | BODY MASS INDEX: 26.66 KG/M2 | RESPIRATION RATE: 16 BRPM

## 2023-07-12 DIAGNOSIS — R23.2 HOT FLASHES: Primary | ICD-10-CM

## 2023-07-12 PROCEDURE — 3074F SYST BP LT 130 MM HG: CPT | Performed by: FAMILY MEDICINE

## 2023-07-12 PROCEDURE — 3079F DIAST BP 80-89 MM HG: CPT | Performed by: FAMILY MEDICINE

## 2023-07-12 PROCEDURE — 3008F BODY MASS INDEX DOCD: CPT | Performed by: FAMILY MEDICINE

## 2023-07-12 PROCEDURE — 99213 OFFICE O/P EST LOW 20 MIN: CPT | Performed by: FAMILY MEDICINE

## 2023-07-12 RX ORDER — VENLAFAXINE HYDROCHLORIDE 75 MG/1
75 CAPSULE, EXTENDED RELEASE ORAL DAILY
Qty: 90 CAPSULE | Refills: 0 | Status: SHIPPED | OUTPATIENT
Start: 2023-07-12

## 2023-07-12 NOTE — PROGRESS NOTES
Subjective:   Patient ID: Ryan Jackson is a 59year old female. Pt presents with hx hot flashes for years. Has had persistent symptoms despite medication. Has had work up with her gynecologist and blood work. No acute illness. Diet has been good. Pt has had hx of history of hysterectomy and breast surgery for breast cancer. Was started on venlafaxine for over a year and was prescribed by her gynecologist.   Pt states medication helping some. History/Other:   Review of Systems   Constitutional:  Negative for fever. Respiratory:  Negative for cough and shortness of breath. Cardiovascular:  Negative for chest pain. Endocrine: Negative for cold intolerance and heat intolerance. Current Outpatient Medications   Medication Sig Dispense Refill    venlafaxine ER 75 MG Oral Capsule SR 24 Hr Take 1 capsule (75 mg total) by mouth daily. 90 capsule 0    Vitamin C 500 MG Oral Tab Take 1 tablet (500 mg total) by mouth daily. Allergies:No Known Allergies    Objective:   Physical Exam  Constitutional:       Appearance: Normal appearance. Neck:      Thyroid: No thyroid mass, thyromegaly or thyroid tenderness. Cardiovascular:      Rate and Rhythm: Normal rate and regular rhythm. Heart sounds: Normal heart sounds. Pulmonary:      Effort: Pulmonary effort is normal.      Breath sounds: Normal breath sounds. Musculoskeletal:      Cervical back: No rigidity. Lymphadenopathy:      Cervical: No cervical adenopathy. Neurological:      Mental Status: She is alert. Psychiatric:         Mood and Affect: Mood normal.         Behavior: Behavior normal.         Thought Content: Thought content normal.         Judgment: Judgment normal.         Assessment & Plan:   Hot flashes: reviewed previous testing:  - After discussion, will increase venlafaxine to 75mg daily as discussed; o call if problems or side effects; Consider follow up with endocrinology for further evaluation and treatment. Patient verbalized understanding of recommendations and agrees to plan. No orders of the defined types were placed in this encounter. Meds This Visit:  Requested Prescriptions     Signed Prescriptions Disp Refills    venlafaxine ER 75 MG Oral Capsule SR 24 Hr 90 capsule 0     Sig: Take 1 capsule (75 mg total) by mouth daily.        Imaging & Referrals:  ENDOCRINOLOGY - INTERNAL

## 2023-09-22 NOTE — TELEPHONE ENCOUNTER
Spoke with pt,  verified, pt was informed pharm sent rx ref for valacyclovir. Per out record rx listed as historical.   Pt c/o vaginal break out, redness, soreness, a year ago her gyne dx'd her with vaginal herpes. Pt stated Dr Frankie Vidal was aware of that and went to the hosp, pt has break out like every  3-4 mos. Pt is not sure who was the original prescriber. Routed to Dr Frankie Vidal for advise, thanks. Reason for call was changed to acute from rx refill. pls advise, thanks in advance.

## 2023-09-22 NOTE — TELEPHONE ENCOUNTER
Please review refill protocol failed/ no protocol  Requested Prescriptions   Pending Prescriptions Disp Refills    VALACYCLOVIR 1 G Oral Tab [Pharmacy Med Name: VALACYCLOVIR 1GM TABLETS] 20 tablet 5     Sig: TAKE 1 TABLET BY MOUTH TWICE DAILY FOR 10 DAYS       There is no refill protocol information for this order

## 2023-09-23 RX ORDER — VALACYCLOVIR HYDROCHLORIDE 1 G/1
TABLET, FILM COATED ORAL
Qty: 20 TABLET | Refills: 5 | Status: SHIPPED | OUTPATIENT
Start: 2023-09-23

## 2023-09-23 NOTE — TELEPHONE ENCOUNTER
Message noted: Chart reviewed and may refill medication as requested. Prescription sent to listed pharmacy. Pharmacy to notify patient.  Pt notified through [facility] [place]

## 2023-10-16 NOTE — TELEPHONE ENCOUNTER
Dr Bhat Roads =passes protocol but was this was just prescribed for 90 day supply  from last visit, pended for approval.     7/12/23 Dr Hercules's OFFICE VISIT note ;  Assessment & Plan:   Hot flashes: reviewed previous testing:  - After discussion, will increase venlafaxine to 75mg daily as discussed; o call if problems or side effects; Consider follow up with endocrinology for further evaluation and treatment. Future Appointments   Date Time Provider Myra Marinelli   11/1/2023  5:00 PM Cassandra Savage MD [facility]         Refill passed per [facility] protocol.      Requested Prescriptions   Pending Prescriptions Disp Refills    VENLAFAXINE ER 75 MG Oral Capsule SR 24 Hr [Pharmacy Med Name: VENLAFAXINE ER 75MG CAPSULES] 90 capsule 0     Sig: TAKE 1 CAPSULE(75 MG) BY MOUTH DAILY       Psychiatric Non-Scheduled (Anti-Anxiety) Passed - 10/15/2023  1:04 PM        Passed - In person appointment or virtual visit in the past 6 mos or appointment in next 3 mos     Recent Outpatient Visits              3 months ago Hot flashes    Britni Garzon MD    Office Visit    10 months ago Concussion with unknown loss of consciousness status, initial encounter    [address] [facility] [place]    Office Visit    10 months ago Absence of breast, bilateral    Drea Ghosh MD    Office Visit    1 year ago Preoperative general physical examination    Britni Garzon MD    Office Visit    1 year ago Upper respiratory disease    [facility], [address], [place], [place]    Telemedicine          Future Appointments         Provider Department Appt Notes    In 2 weeks Cassandra Savage MD [facility], [address] [place] [address] Hot flashes                            Future Appointments         Provider Department Appt Notes    In 2 weeks Silas Tavarez MD [facility], [place] [address] [facility] Hot flashes             Recent Outpatient Visits              3 months ago Hot flashes    Ellis Vazquez MD    Office Visit    10 months ago Concussion with unknown loss of consciousness status, initial encounter    Shayla Mayo APRN    Office Visit    10 months ago Absence of breast, bilateral    Marcio Mcintosh MD    Office Visit    1 year ago Preoperative general physical examination    Ellis Vazquez MD    Office Visit    1 year ago Upper respiratory disease    [address], [address], [place], [place] Telemedicine

## 2023-10-17 RX ORDER — VENLAFAXINE HYDROCHLORIDE 75 MG/1
75 CAPSULE, EXTENDED RELEASE ORAL DAILY
Qty: 90 CAPSULE | Refills: 1 | Status: SHIPPED | OUTPATIENT
Start: 2023-10-17

## 2023-11-01 ENCOUNTER — OFFICE VISIT (OUTPATIENT)
Dept: ENDOCRINOLOGY CLINIC | Facility: CLINIC | Age: 58
End: 2023-11-01
Payer: COMMERCIAL

## 2023-11-01 ENCOUNTER — TELEPHONE (OUTPATIENT)
Dept: ENDOCRINOLOGY CLINIC | Facility: CLINIC | Age: 58
End: 2023-11-01

## 2023-11-01 VITALS
BODY MASS INDEX: 26.03 KG/M2 | HEART RATE: 93 BPM | DIASTOLIC BLOOD PRESSURE: 78 MMHG | SYSTOLIC BLOOD PRESSURE: 117 MMHG | WEIGHT: 162 LBS | HEIGHT: 66 IN

## 2023-11-01 DIAGNOSIS — N95.1 HOT FLASHES DUE TO MENOPAUSE: Primary | ICD-10-CM

## 2023-11-01 PROCEDURE — 99244 OFF/OP CNSLTJ NEW/EST MOD 40: CPT | Performed by: INTERNAL MEDICINE

## 2023-11-01 PROCEDURE — 3008F BODY MASS INDEX DOCD: CPT | Performed by: INTERNAL MEDICINE

## 2023-11-01 PROCEDURE — 3074F SYST BP LT 130 MM HG: CPT | Performed by: INTERNAL MEDICINE

## 2023-11-01 PROCEDURE — 3078F DIAST BP <80 MM HG: CPT | Performed by: INTERNAL MEDICINE

## 2023-11-01 NOTE — TELEPHONE ENCOUNTER
Called and spoke to patient and notified her that Dr. Anias Quiroga is running on time. I spoke with patient, she states that she does not feel any better, she is still very dizzy, and her incontinence is worse. I explained that Dr. Mercado is out of the office this week and that according to telephone note from yesterday, Dr. Garcia stated that Home Health needed to repeat Orthostatics today, and that if she was worse, then she could come in to be seen either today by either Joie or Dr. Salas, or she could come in on Thursday and see Dr. Garcia. Patient states that she already has a follow up appt with Dr. Mercado on Tuesday, 10/30/18. She really does not want to see any other provider. I explained that any of the providers here would be more than happy to see her, she again states that she is going to just wait until her appt on Tuesday. I told patient to have Home Health nurse who is coming at 1300 today to call me if she needed anything else, patient understands.   ----- Message from Johnna John MA sent at 10/24/2018 11:13 AM EDT -----  Regarding: FW: HOME HEALTH      ----- Message -----  From: Faustino Corea  Sent: 10/24/2018  11:00 AM  To: [facility] [place]  Subject: FW: HOME HEALTH                                  PATIENT CALLED TODAY TO SAY THAT SHE NEEDS FOR JOHNNA TO CALL HER BACK. SHE SAID THAT SHE CANNOT WALK 5 STEPS WITHOUT WETTING HERSELF.  [phone]      THANKS!  FAUSTINO    ----- Message -----  From: Faustino Corea  Sent: 10/23/2018   3:40 PM  To: [facility] [place]  Subject: HOME HEALTH                                      [facility]    I received a call from Laura [facility], to say that she did the evaluation on the patient. She said that she has a lot going on.    Patient did fall and hit her head last week    1. Central Vertigo  2. Fixed gaze mystagmus  3. Intermittent chest pain on right side  4. Feels like food gets stuck in throat when swallowing  5. Gets really fatigued  6. Dizziness  7. Nauseous constantly  8. Orthostatic  hypertension  9. TIA issue    Not positional vertigo. bp readings on forearm were   Laying down - 118/58  Sat up 124/70  Stood up 96/42    Laura said that she is going back 2 x next week and 1 time a week for 2 weeks  Please call Laura with questions or to discuss [phone]    Thanks!  deb

## 2023-11-01 NOTE — TELEPHONE ENCOUNTER
Patient calling to ask if provider is running behind at this moment, did reach out no answer.  Please call appointment is today at [address] [facility].

## 2023-11-02 RX ORDER — VENLAFAXINE HYDROCHLORIDE 37.5 MG/1
37.5 CAPSULE, EXTENDED RELEASE ORAL DAILY
Qty: 30 CAPSULE | Refills: 0 | Status: SHIPPED | OUTPATIENT
Start: 2023-11-02 | End: 2023-11-03

## 2023-11-02 NOTE — TELEPHONE ENCOUNTER
Thanks for the update.   We can work with Kika to wean her off of venlafaxine so she can start the veozah. Will have staff contact pt to discuss weaning off of medication.

## 2023-11-02 NOTE — TELEPHONE ENCOUNTER
Kennedy Hercules!    I would like to start Ms. Pires on Veozah, a neurokinin 3 receptor agonist. As you know, she is currently on Venlafaxine. I would appreciate your help in tapering her off of this so that I may start her on this medication. Please let me know when she is no longer on this medication. Thank you!

## 2023-11-02 NOTE — TELEPHONE ENCOUNTER
Message noted. Can reduce venlafaxine ER to 37.5mg daily for 1-2 week. Then take 37.5 mg every other day for 1-2 weeks and then stop. Will send script for lower dose of venlafaxine to pharmacy to wean.

## 2023-11-03 RX ORDER — VENLAFAXINE HYDROCHLORIDE 37.5 MG/1
37.5 CAPSULE, EXTENDED RELEASE ORAL DAILY
Qty: 30 CAPSULE | Refills: 0 | Status: SHIPPED | OUTPATIENT
Start: 2023-11-03

## 2023-11-03 NOTE — TELEPHONE ENCOUNTER
Relayed  Message,verbalized understanding - stated Pharmacy said rx was delayed , rx sent with tapering instructions/note to Pharmacy, Pt will let Dr know when she is finished with RX    Sent to Endo

## 2023-11-07 ENCOUNTER — TELEMEDICINE (OUTPATIENT)
Dept: FAMILY MEDICINE CLINIC | Facility: CLINIC | Age: 58
End: 2023-11-07
Payer: COMMERCIAL

## 2023-11-07 DIAGNOSIS — J01.90 ACUTE SINUSITIS, RECURRENCE NOT SPECIFIED, UNSPECIFIED LOCATION: Primary | ICD-10-CM

## 2023-11-07 PROCEDURE — 99213 OFFICE O/P EST LOW 20 MIN: CPT | Performed by: FAMILY MEDICINE

## 2023-11-07 RX ORDER — AMOXICILLIN 875 MG/1
875 TABLET, COATED ORAL 2 TIMES DAILY
Qty: 20 TABLET | Refills: 0 | Status: SHIPPED | OUTPATIENT
Start: 2023-11-07

## 2023-11-07 NOTE — PROGRESS NOTES
Subjective:   Patient ID: Ingrid Thakur is a 59year old female. This visit is conducted using Telemedicine with live, interactive video and audio during this Coronavirus pandemic. Please note that the following visit was completed using two-way, real-time interactive audio and/or video communication. This has been done in good [place] to provide continuity of care in the best interest of the provider-patient relationship, due to the ongoing public health crisis/national emergency and because of restrictions of visitation. There are limitations of this visit as no physical exam could be performed. Every conscious effort was taken to allow for sufficient and adequate time. This billing was spent on reviewing labs, medications, radiology tests and decision making. Appropriate medical decision-making and tests are ordered as detailed in the plan of care above    Virtual Telephone Check-In    Ingrid Thakur verbally consents to a Virtual/Telephone Check-In visit on 11/07/23. Patient has been referred to the [facility] website at [email or web address] to review the yearly Consent to Treat document. Patient understands and accepts financial responsibility for any deductible, co-insurance and/or co-pays associated with this service. Duration of the service: 5 minutes      Summary of topics discussed: sore throat / cold     Pt presents with cold symptoms for 2-3 days. Pt has had congestion, sore throat. No fevers. Pt has tried otc remedies without relief. 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 Had negative COVID testing. Marti Domínguez MD                  History/Other:   Review of Systems   Constitutional:  Negative for fever. HENT:  Positive for congestion, sinus pressure and sore throat. Negative for ear pain. Respiratory:  Negative for cough and shortness of breath.       Current Outpatient Medications   Medication Sig Dispense Refill    amoxicillin 875 MG Oral Tab Take 1 tablet (875 mg total) by mouth 2 (two) times daily. 20 tablet 0    venlafaxine ER 37.5 MG Oral Capsule SR 24 Hr Take 1 capsule (37.5 mg total) by mouth daily. And wean as directed-Can reduce venlafaxine ER to 37.5mg daily for 1-2 week. Then take 37.5 mg every other day for 1-2 weeks and then stop. Will send script for lower dose of venlafaxine to pharmacy to wean. 30 capsule 0    valACYclovir 1 G Oral Tab TAKE 1 TABLET BY MOUTH TWICE DAILY FOR 10 DAYS 20 tablet 5    Vitamin C 500 MG Oral Tab Take 1 tablet (500 mg total) by mouth daily. Allergies:No Known Allergies    Objective:   Physical Exam  Constitutional:       Appearance: Normal appearance. Pulmonary:      Effort: Pulmonary effort is normal. No respiratory distress. Comments: Pt is breathing comfortable over the video/ phone and speaking in full sentences without shortness of breath          Neurological:      Mental Status: She is alert. Psychiatric:         Mood and Affect: Mood normal.         Behavior: Behavior normal.         Assessment & Plan:   1. Acute sinusitis, recurrence not specified, unspecified location    - After discussion with patient, amoxicillin as directed; Over the counter remedies discussed; To call if worse or not better; Follow up in one week if not resolved or as needed if worse. Note for work provided   VIDEO VISIT done. No orders of the defined types were placed in this encounter. Meds This Visit:  Requested Prescriptions     Signed Prescriptions Disp Refills    amoxicillin 875 MG Oral Tab 20 tablet 0     Sig: Take 1 tablet (875 mg total) by mouth 2 (two) times daily.        Imaging & Referrals:  None

## 2023-12-10 ENCOUNTER — PATIENT MESSAGE (OUTPATIENT)
Dept: ENDOCRINOLOGY CLINIC | Facility: CLINIC | Age: 58
End: 2023-12-10

## 2023-12-10 DIAGNOSIS — N95.1 HOT FLASHES DUE TO MENOPAUSE: Primary | ICD-10-CM

## 2023-12-11 RX ORDER — FEZOLINETANT 45 MG/1
45 TABLET, FILM COATED ORAL
Qty: 90 TABLET | Refills: 0 | Status: SHIPPED | OUTPATIENT
Start: 2023-12-11 | End: 2024-03-10

## 2023-12-11 NOTE — TELEPHONE ENCOUNTER
Hi!  Please let patient know that I will prescribe the Veozah medication. I would like to check liver function tests beforehand. Once I have seen that they are within normal limits, she may start on the medication. Thank you!

## 2023-12-11 NOTE — TELEPHONE ENCOUNTER
Dr. Jaime Kolb,     Please advise regarding patient completing previous prescription from PCP--venlafaxine

## 2023-12-14 ENCOUNTER — LAB ENCOUNTER (OUTPATIENT)
Dept: LAB | Facility: HOSPITAL | Age: 58
End: 2023-12-14
Attending: INTERNAL MEDICINE
Payer: COMMERCIAL

## 2023-12-14 DIAGNOSIS — N95.1 HOT FLASHES DUE TO MENOPAUSE: ICD-10-CM

## 2023-12-14 LAB
ALBUMIN SERPL-MCNC: 4.5 G/DL (ref 3.2–4.8)
ALBUMIN/GLOB SERPL: 1.6 {RATIO} (ref 1–2)
ALP LIVER SERPL-CCNC: 76 U/L
ALT SERPL-CCNC: 15 U/L
ANION GAP SERPL CALC-SCNC: 5 MMOL/L (ref 0–18)
AST SERPL-CCNC: 17 U/L (ref ?–34)
BILIRUB SERPL-MCNC: 0.5 MG/DL (ref 0.2–1.1)
BUN BLD-MCNC: 14 MG/DL (ref 9–23)
BUN/CREAT SERPL: 16.7 (ref 10–20)
CALCIUM BLD-MCNC: 9.8 MG/DL (ref 8.7–10.4)
CHLORIDE SERPL-SCNC: 106 MMOL/L (ref 98–112)
CO2 SERPL-SCNC: 31 MMOL/L (ref 21–32)
CREAT BLD-MCNC: 0.84 MG/DL
EGFRCR SERPLBLD CKD-EPI 2021: 80 ML/MIN/1.73M2 (ref 60–?)
FASTING STATUS PATIENT QL REPORTED: NO
GLOBULIN PLAS-MCNC: 2.9 G/DL (ref 2.8–4.4)
GLUCOSE BLD-MCNC: 82 MG/DL (ref 70–99)
OSMOLALITY SERPL CALC.SUM OF ELEC: 294 MOSM/KG (ref 275–295)
POTASSIUM SERPL-SCNC: 4.2 MMOL/L (ref 3.5–5.1)
PROT SERPL-MCNC: 7.4 G/DL (ref 5.7–8.2)
SODIUM SERPL-SCNC: 142 MMOL/L (ref 136–145)

## 2023-12-14 PROCEDURE — 80053 COMPREHEN METABOLIC PANEL: CPT

## 2023-12-14 PROCEDURE — 36415 COLL VENOUS BLD VENIPUNCTURE: CPT

## 2023-12-18 ENCOUNTER — TELEPHONE (OUTPATIENT)
Dept: ENDOCRINOLOGY CLINIC | Facility: CLINIC | Age: 58
End: 2023-12-18

## 2023-12-20 ENCOUNTER — TELEPHONE (OUTPATIENT)
Dept: ENDOCRINOLOGY CLINIC | Facility: CLINIC | Age: 58
End: 2023-12-20

## 2023-12-20 NOTE — TELEPHONE ENCOUNTER
Fezolinetant (VEOZAH) 45 MG Oral Tab, Take 45 mg by mouth daily with breakfast., Disp: 90 tablet, Rfl: 0      Message:   Plan does not cover the medication.

## 2023-12-20 NOTE — TELEPHONE ENCOUNTER
Aaliyah/pharm calling for update on P.A., informed still pending. Please call when any updates at [phone],thanks.

## 2023-12-20 NOTE — TELEPHONE ENCOUNTER
Medication  CGM  pump supply Requested: Fezolinetant (VEOZAH) 45 MG Oral Tab     Sig: Take 45 mg by mouth daily with breakfast     DX Code: N95.1                                       Case Number/Pending Ref#:

## 2023-12-21 NOTE — TELEPHONE ENCOUNTER
Medication PA Requested:Fezolinetant (VEOZAH) 45 MG Oral Tab                                                  CoverMyMeds Used:  Yes  SQW:[id number]  Quantity:90  Day Supply: 90  Sig:  Take 45 mg by mouth daily with breakfast.    DX Code: N95.1                                     CPT code (if applicable):   Case Number/Pending Ref#          CMM submitted, LOV 11/1  Awaiting determination

## 2023-12-22 ENCOUNTER — PATIENT MESSAGE (OUTPATIENT)
Dept: FAMILY MEDICINE CLINIC | Facility: CLINIC | Age: 58
End: 2023-12-22

## 2023-12-26 ENCOUNTER — TELEPHONE (OUTPATIENT)
Dept: ENDOCRINOLOGY CLINIC | Facility: CLINIC | Age: 58
End: 2023-12-26

## 2023-12-26 NOTE — TELEPHONE ENCOUNTER
Pharmacy requesting prior auth for     Fezolinetant [facility]) 45 MG Oral Tab, Take 45 mg by mouth daily with breakfast., Disp: 90 tablet, Rfl: 0

## 2023-12-27 NOTE — TELEPHONE ENCOUNTER
This was already submitted prior to below date. Pt made aware of approval.   Refer to 12/10/23 mychart encounter.

## 2024-01-24 RX ORDER — VALACYCLOVIR HYDROCHLORIDE 1 G/1
TABLET, FILM COATED ORAL
Qty: 20 TABLET | Refills: 5 | OUTPATIENT
Start: 2024-01-24

## 2024-02-17 ENCOUNTER — E-VISIT (OUTPATIENT)
Dept: TELEHEALTH | Age: 59
End: 2024-02-17
Payer: COMMERCIAL

## 2024-02-17 DIAGNOSIS — U07.1 COVID-19: Primary | ICD-10-CM

## 2024-02-17 DIAGNOSIS — U07.1 POSITIVE SELF-ADMINISTERED ANTIGEN TEST FOR COVID-19: ICD-10-CM

## 2024-02-17 PROCEDURE — 99422 OL DIG E/M SVC 11-20 MIN: CPT | Performed by: NURSE PRACTITIONER

## 2024-02-17 NOTE — PROGRESS NOTES
Kika Pires is a 58 year old female submitting e-visit for COVID  HPI:   See answers to questionnaire and "[employer] message exchange  Spoke with pt by phone to get further details of illness.  Pt entered day of sx onset as 2/10 and that she had a negative COVID test on 2/12.  Pt states she was so fatigued, she must have entered incorrect information.  States sx onset in the evening 2/15 and took her first COVID test today and it was positive.  Reports at sx onset she had congestion, body aches, cough, HA, fatigue, sore throat, and wheezing with coughing.    Reports today she is still having nasal congestion; cough. Body aches, and HA have improved.  Cough now mild. Still fatigued but has been able to get up and shower.   Denies fever, SOB, wheezing,       Current Outpatient Medications   Medication Sig Dispense Refill    Fezolinetant (VEOZAH) 45 MG Oral Tab Take 45 mg by mouth daily with breakfast. 90 tablet 0    amoxicillin 875 MG Oral Tab Take 1 tablet (875 mg total) by mouth 2 (two) times daily. 20 tablet 0    venlafaxine ER 37.5 MG Oral Capsule SR 24 Hr Take 1 capsule (37.5 mg total) by mouth daily. And wean as directed-Can reduce venlafaxine ER to 37.5mg daily for 1-2 week. Then take 37.5 mg every other day for 1-2 weeks and then stop. Will send script for lower dose of venlafaxine to pharmacy to wean. 30 capsule 0    valACYclovir 1 G Oral Tab TAKE 1 TABLET BY MOUTH TWICE DAILY FOR 10 DAYS 20 tablet 5    Vitamin C 500 MG Oral Tab Take 1 tablet (500 mg total) by mouth daily.        Past Medical History:   Diagnosis Date    Cancer (HCC) 2020    Left Breast     Diverticulitis     Family history of breast cancer     with pt's mom and sister    Hx of motion sickness     PONV (postoperative nausea and vomiting)     Postmenopause 2013    pot shira at age 48    Visual impairment     wear glasses      Past Surgical History:   Procedure Laterality Date    COLONOSCOPY N/A 2014    @ [facility]    COLONOSCOPY       COLONOSCOPY N/A 05/29/2021    Procedure: COLONOSCOPY;  Surgeon: Nicholas Arzola MD;  Location: [facility] ENDOSCOPY    EGD N/A     HYSTERECTOMY  10/11/2022    MASTECTOMY LEFT  11/16/2020    Lymph Node x1 removed    MASTECTOMY RIGHT      November 16th, 2020    NEEDLE BIOPSY LEFT Left 05/2018    benign    NEEDLE BIOPSY RIGHT Right 05/2018    benign    OTHER N/A 2000    vocal chord surgery- extra/benign tissue      Family History   Problem Relation Age of Onset    Breast Cancer Mother 75    Cancer Mother     Other (hypothyroidism) Mother     Breast Cancer Sister 45    No Known Problems Father     No Known Problems Daughter     No Known Problems Son     No Known Problems Daughter     No Known Problems Daughter       Social History:  Social History     Socioeconomic History    Marital status:     Number of children: 4   Occupational History    Occupation: teacher   Tobacco Use    Smoking status: Former     Types: Cigarettes    Smokeless tobacco: Never    Tobacco comments:     quit 1985   Vaping Use    Vaping Use: Never used   Substance and Sexual Activity    Alcohol use: Never     Alcohol/week: 0.0 standard drinks of alcohol    Drug use: No    Sexual activity: Yes   Other Topics Concern    Pt has a pacemaker No    Pt has a defibrillator No    Reaction to local anesthetic No    Blood Transfusions No   Social History Narrative    No H/O abuse          ASSESSMENT AND PLAN:       Diagnoses and all orders for this visit:    COVID-19  -     nirmatrelvir-ritonavir (PAXLOVID, 300/100,) 300-100 MG Oral Tablet Therapy Pack; Take by mouth twice daily for 5 days as directed on dose pack    Positive self-administered antigen test for COVID-19  -     nirmatrelvir-ritonavir (PAXLOVID, 300/100,) 300-100 MG Oral Tablet Therapy Pack; Take by mouth twice daily for 5 days as directed on dose pack    Home COVID test positive  Discussed use, dose, and possible side effects of Paxlovid  Drug interactions with Paxlovid: none   Pt denies  history of liver or kidney conditions.  EGFR 80; liver enzymes WNL  Pt agreeable to Paxlovid.   Supportive measures as described in Patient Instructions.   Patient advised to follow up with PCP if no improvement or worsening of symptoms   To go to the ER for any severe symptoms such as chest pain or difficulty breathing.   Provided  CDC recommendations for self isolation    Refer to [employer] exchange for specific patient instructions    Duration of  the service:  18 minutes

## 2024-02-18 LAB — AMB EXT COVID-19 RESULT: DETECTED

## 2024-02-18 RX ORDER — NIRMATRELVIR AND RITONAVIR 300-100 MG
KIT ORAL
Qty: 1 EACH | Refills: 0 | Status: SHIPPED | OUTPATIENT
Start: 2024-02-18

## 2024-02-19 RX ORDER — VALACYCLOVIR HYDROCHLORIDE 1 G/1
TABLET, FILM COATED ORAL
Qty: 20 TABLET | Refills: 5 | Status: SHIPPED | OUTPATIENT
Start: 2024-02-19

## 2024-02-19 NOTE — TELEPHONE ENCOUNTER
Message noted: Chart reviewed and may refill medication as requested. Prescription sent to listed pharmacy. Pharmacy to notify patient. Pt notified through [employer]

## 2024-02-19 NOTE — TELEPHONE ENCOUNTER
Refill passed per [facility] protocol.  Last Telemedicine  visit 11/07/2023    Last refill 09/23/2023    Requested Prescriptions   Pending Prescriptions Disp Refills    valACYclovir 1 G Oral Tab 20 tablet 5     Sig: TAKE 1 TABLET BY MOUTH TWICE DAILY FOR 10 DAYS       Herpes Agent Protocol Passed - 2/17/2024  7:19 PM        Passed - In person appointment or virtual visit in the past 12 mos or appointment in next 3 mos     Recent Outpatient Visits              2 days ago COVID-19    [facility], Virtual Visit Vanda Toney APRN    E-Visit    3 months ago Acute sinusitis, recurrence not specified, unspecified location    [facility] [place]    Telemedicine    3 months ago Hot flashes due to menopause    [facility], [facility] [place]    Office Visit    7 months ago Hot flashes    [facility] [place] [facility]    Office Visit    1 year ago Concussion with unknown loss of consciousness status, initial encounter    [facility]    Office Visit                         Recent Outpatient Visits              2 days ago COVID-[place]    [facility], Virtual Visit Vanda Toney APRN    E-Visit    3 months ago Acute sinusitis, recurrence not specified, unspecified location    [facility]    [facility]medicine    3 months ago Hot flashes due to menopause    [facility], Twyla Kunz MD    Office Visit    7 months ago [facility]    [facility] Visit    1 year ago Concussion with unknown loss of consciousness status, initial encounter    [facility],  Sharri Calderon, BRENT    Office Visit

## 2024-03-20 DIAGNOSIS — N95.1 HOT FLASHES DUE TO MENOPAUSE: ICD-10-CM

## 2024-03-22 RX ORDER — FEZOLINETANT 45 MG/1
1 TABLET, FILM COATED ORAL
Qty: 90 TABLET | Refills: 0 | OUTPATIENT
Start: 2024-03-22

## 2024-03-23 ENCOUNTER — E-VISIT (OUTPATIENT)
Dept: TELEHEALTH | Age: 59
End: 2024-03-23
Payer: COMMERCIAL

## 2024-03-23 DIAGNOSIS — R39.9 UTI SYMPTOMS: Primary | ICD-10-CM

## 2024-03-23 RX ORDER — PHENAZOPYRIDINE HYDROCHLORIDE 200 MG/1
TABLET, FILM COATED ORAL
Qty: 6 TABLET | Refills: 0 | Status: SHIPPED | OUTPATIENT
Start: 2024-03-23

## 2024-03-23 RX ORDER — NITROFURANTOIN 25; 75 MG/1; MG/1
CAPSULE ORAL
Qty: 14 CAPSULE | Refills: 0 | Status: SHIPPED
Start: 2024-03-23

## 2024-03-25 ENCOUNTER — TELEPHONE (OUTPATIENT)
Dept: ENDOCRINOLOGY CLINIC | Facility: CLINIC | Age: 59
End: 2024-03-25

## 2024-04-22 RX ORDER — VENLAFAXINE HYDROCHLORIDE 75 MG/1
75 CAPSULE, EXTENDED RELEASE ORAL DAILY
Qty: 90 CAPSULE | Refills: 1 | OUTPATIENT
Start: 2024-04-22

## 2024-05-14 NOTE — PROGRESS NOTES
Follow-up - Reason for Visit:  Hot Flashes.  Requesting Physician: Dr. Hercules.    CHIEF COMPLAINT:    Chief Complaint   Patient presents with    Follow - Up     Menopause and weight loss management         HISTORY OF PRESENT ILLNESS:   Kika Pires is a 59 year old female who presents with hot flashes. She was diagnosed with breast cancer and had a total hysterectomy. Her primary care physician started her on venlafaxine. This had been helping a little bit, but did not make her feel 100%. At the last visit, we started her on Veozah.     She is feeling much better now. However, she is concerned about inability to lose weight.     PAST MEDICAL HISTORY:   Past Medical History:    Cancer (HCC)    Left Breast     Diverticulitis    Family history of breast cancer    with pt's mom and sister    Hx of motion sickness    PONV (postoperative nausea and vomiting)    Postmenopause    pot shira at age 48    Visual impairment    wear glasses       PAST SURGICAL HISTORY:   Past Surgical History:   Procedure Laterality Date    Colonoscopy N/A 2014    @ [facility]    Colonoscopy      Colonoscopy N/A 05/29/2021    Procedure: COLONOSCOPY;  Surgeon: Nicholas Arzola MD;  Location: [facility] ENDOSCOPY    Egd N/A     Hysterectomy  10/11/2022    Mastectomy left  11/16/2020    Lymph Node x1 removed    Mastectomy right      November 16th, 2020    Needle biopsy left Left 05/2018    benign    Needle biopsy right Right 05/2018    benign    Other N/A 2000    vocal chord surgery- extra/benign tissue       SOCIAL HISTORY:    Social History     Socioeconomic History    Marital status:     Number of children: 4   Occupational History    Occupation: teacher   Tobacco Use    Smoking status: Former     Types: Cigarettes    Smokeless tobacco: Never    Tobacco comments:     quit 1985   Vaping Use    Vaping status: Never Used   Substance and Sexual Activity    Alcohol use: Never     Alcohol/week: 0.0 standard drinks of alcohol    Drug use: No     Sexual activity: Yes   Other Topics Concern    Pt has a pacemaker No    Pt has a defibrillator No    Reaction to local anesthetic No    Blood Transfusions No   Social History Narrative    No H/O abuse        FAMILY HISTORY:   Family History   Problem Relation Age of Onset    Breast Cancer Mother 75    Cancer Mother     Other (hypothyroidism) Mother     Breast Cancer Sister 45    No Known Problems Father     No Known Problems Daughter     No Known Problems Son     No Known Problems Daughter     No Known Problems Daughter        CURRENT MEDICATIONS:    Current Outpatient Medications   Medication Sig Dispense Refill    Fezolinetant (VEOZAH) 45 MG Oral Tab Take 45 mg by mouth once daily. 90 tablet 0    nitrofurantoin monohydrate macro 100 MG Oral Cap Take 1 tablet by mouth two times a day for 7 days 14 capsule 0    phenazopyridine 200 MG Oral Tab Take 1 tablet every 8 hours with food, as needed for urinary burning. 6 tablet 0    valACYclovir 1 G Oral Tab TAKE 1 TABLET BY MOUTH TWICE DAILY FOR 10 DAYS 20 tablet 5    nirmatrelvir-ritonavir (PAXLOVID, 300/100,) 300-100 MG Oral Tablet Therapy Pack Take by mouth twice daily for 5 days as directed on dose pack 1 each 0    amoxicillin 875 MG Oral Tab Take 1 tablet (875 mg total) by mouth 2 (two) times daily. 20 tablet 0    venlafaxine ER 37.5 MG Oral Capsule SR 24 Hr Take 1 capsule (37.5 mg total) by mouth daily. And wean as directed-Can reduce venlafaxine ER to 37.5mg daily for 1-2 week. Then take 37.5 mg every other day for 1-2 weeks and then stop. Will send script for lower dose of venlafaxine to pharmacy to wean. 30 capsule 0    Vitamin C 500 MG Oral Tab Take 1 tablet (500 mg total) by mouth daily.         ALLERGIES:  No Known Allergies      ASSESSMENTS:     REVIEW OF SYSTEMS:  Constitutional: Negative for: weight change, fever, fatigue, cold/heat intolerance  Eyes: Negative for:  Visual changes, proptosis, blurring  ENT: Negative for:  dysphagia, neck swelling,  dysphonia  Respiratory: Negative for:  dyspnea, cough  Cardiovascular: Negative for:  chest pain, palpitations, orthopnea  GI: Negative for:  abdominal pain, nausea, vomiting, diarrhea, constipation, bleeding  Neurology: Negative for: headache, numbness, weakness  Genito-Urinary: Negative for: dysuria, frequency  Psychiatric: Negative for:  depression, anxiety  Hematology/Lymphatics: Negative for: bruising, lower extremity edema  Endocrine: Negative for: polyuria, polydypsia  Skin: Negative for: rash, blister, cellulitis,      PHYSICAL EXAM:   Vitals:    05/15/24 1759   BP: 121/75   Pulse: 76   Weight: (P) 164 lb (74.4 kg)   Height: 5' 6\" (1.676 m)       BMI: Body mass index is 26.47 kg/m² (pended).     CONSTITUTIONAL:  awake, alert, cooperative, no apparent distress, and appears stated age  PSYCH: normal affect  EYES:  No proptosis, no ptosis, conjunctiva normal  SKIN:  no bruising or bleeding, no rashes and no lesions  EXTREMITIES: no edema      DATA:   Reviewed    ASSESSMENT AND PLAN: This is a 59 year-old woman with a history of breast cancer here for follow-up of management of hot flashes due to menopause. She has been taking the Veozah and this is working well for her. She is still finding it difficult to lose weight and so I would like to perform a metabolic evaluation. I will follow up with patient once test resylts are back.    Prior to this encounter, I spent over 15 minutes with preparing for the visit, including reviewing documents from other specialties as well as from PCP and going over test results and imaging studies. During the face to face encounter, I spent an additional 15 minutes which were determined for follow-up. Greater than 50% of the time was spent in counseling, anticipatory guidance, and coordination of care. Patient concerns were answered to the best of my knowledge.         5/15/24  Twyla Shepherd MD

## 2024-05-15 ENCOUNTER — OFFICE VISIT (OUTPATIENT)
Dept: ENDOCRINOLOGY CLINIC | Facility: CLINIC | Age: 59
End: 2024-05-15

## 2024-05-15 VITALS
BODY MASS INDEX: 26 KG/M2 | HEIGHT: 66 IN | SYSTOLIC BLOOD PRESSURE: 121 MMHG | HEART RATE: 76 BPM | DIASTOLIC BLOOD PRESSURE: 75 MMHG

## 2024-05-15 DIAGNOSIS — N95.1 HOT FLASHES DUE TO MENOPAUSE: Primary | ICD-10-CM

## 2024-05-15 PROCEDURE — 3074F SYST BP LT 130 MM HG: CPT | Performed by: INTERNAL MEDICINE

## 2024-05-15 PROCEDURE — 3078F DIAST BP <80 MM HG: CPT | Performed by: INTERNAL MEDICINE

## 2024-05-15 PROCEDURE — 99214 OFFICE O/P EST MOD 30 MIN: CPT | Performed by: INTERNAL MEDICINE

## 2024-05-16 ENCOUNTER — PATIENT MESSAGE (OUTPATIENT)
Dept: ENDOCRINOLOGY CLINIC | Facility: CLINIC | Age: 59
End: 2024-05-16

## 2024-05-16 DIAGNOSIS — R53.83 FATIGUE, UNSPECIFIED TYPE: ICD-10-CM

## 2024-05-16 DIAGNOSIS — L65.9 HAIR LOSS: Primary | ICD-10-CM

## 2024-05-22 NOTE — TELEPHONE ENCOUNTER
Dr. Shepherd, Please review and advise patient message below. Would you patient to have any blood work, LOV was 5/15/24,  Thank you.

## 2024-05-26 RX ORDER — VALACYCLOVIR HYDROCHLORIDE 1 G/1
TABLET, FILM COATED ORAL
Qty: 20 TABLET | Refills: 5 | OUTPATIENT
Start: 2024-05-26

## 2024-06-24 ENCOUNTER — PATIENT MESSAGE (OUTPATIENT)
Dept: ENDOCRINOLOGY CLINIC | Facility: CLINIC | Age: 59
End: 2024-06-24

## 2024-06-26 RX ORDER — FEZOLINETANT 45 MG/1
45 TABLET, FILM COATED ORAL DAILY
Qty: 90 TABLET | Refills: 1 | Status: SHIPPED | OUTPATIENT
Start: 2024-06-26

## 2024-06-26 NOTE — TELEPHONE ENCOUNTER
From: Kika Pires  To: Twyla Shepherd  Sent: 6/24/2024 3:50 PM CDT  Subject: Medication    Good afternoon Doctor, I am wondering if you can refill my Veozah 45, I called the pharmacy and I they said they have to get authorization from you. Thank you so much for your time Kika Pires

## 2024-09-04 RX ORDER — VALACYCLOVIR HYDROCHLORIDE 1 G/1
TABLET, FILM COATED ORAL
Qty: 20 TABLET | Refills: 5 | Status: SHIPPED | OUTPATIENT
Start: 2024-09-04

## 2024-09-04 NOTE — TELEPHONE ENCOUNTER
Refill Per Protocol     Requested Prescriptions   Pending Prescriptions Disp Refills    valACYclovir 1 G Oral Tab 20 tablet 5     Sig: TAKE 1 TABLET BY MOUTH TWICE DAILY FOR 10 DAYS       Herpes Agent Protocol Passed - 9/4/2024  5:19 PM        Passed - In person appointment or virtual visit in the past 12 mos or appointment in next 3 mos     Recent Outpatient Visits              3 months ago Hot flashes due to menopause    [facility]    Office Visit    5 months ago UTI symptoms    [facility], Virtual Visit Nancy Patel APRN    E-Visit    6 months ago COVID-19    [facility], Virtual Visit Vanda Toney APRN    E-Visit    10 months ago Acute sinusitis, recurrence not specified, unspecified location    [facility] Zac Hercules MD    Telemedicine    10 months ago Hot flashes due [facility] menopause    [facility]    [facility]          Future Appointments                              Future Appointments         Provider Department Appt Notes    In 6 days Joey Zuñiga MD [facility] overdue 1 year for her f/u - annual  bilateral nipple sparing mastectomy and reconstruction with SSX-615 cc implants in March 2021.            Recent Outpatient Visits              3 months ago Hot flashes due to menopause    [facility], [facility] Twyla Kunz MD    Office Visit    5 months ago UTI symptoms    [facility], Virtual Visit Nancy Patel APRN    E-Visit    6 months ago COVID-[address] [place] [address], Virtual Visit Vanda Toney APRN    E-Visit    10 months ago Acute sinusitis, recurrence not specified, unspecified location    [facility]  [place], Zac Felton MD    Telemedicine    10 months ago Hot flashes due to menopause    [facility], Twyla Kunz MD    Office Visit

## 2024-09-10 ENCOUNTER — OFFICE VISIT (OUTPATIENT)
Dept: SURGERY | Facility: CLINIC | Age: 59
End: 2024-09-10
Payer: COMMERCIAL

## 2024-09-10 DIAGNOSIS — Z90.13 ABSENCE OF BREAST, BILATERAL: Primary | ICD-10-CM

## 2024-09-10 PROCEDURE — 99212 OFFICE O/P EST SF 10 MIN: CPT | Performed by: SURGERY

## 2024-09-10 NOTE — PROGRESS NOTES
Kika Pires is a 59 year old female who presents today for a yearly follow-up.  She has a history of bilateral nipple sparing mastectomy and reconstruction with SSX-615 cc implants in March 2021.  She is without new complaints.  Specifically, she denies any masses, skin changes, or nipple discharge.     Physical Examination:  HEENT: There is no cervical or supraclavicular lymphadenopathy noted.    Breasts: Bilateral breasts are without palpable masses.  The right breast capsule is slightly tight to palpation.  The left breast capsule is soft.  There is no nipple inversion or nipple discharge noted.  There is no axillary lymphadenopathy noted bilaterally.  There is no erythema or seroma noted.    Assessment and Plan:  Patient is doing well.  We reviewed the recommended FDA surveillance protocol for silicone implants.  We discussed continuing regular self breast examination with a plan for follow-up in 1 year or sooner if any changes are detected.  The plan was reviewed with the patient and questions were answered.

## 2024-10-15 ENCOUNTER — TELEMEDICINE (OUTPATIENT)
Dept: FAMILY MEDICINE CLINIC | Facility: CLINIC | Age: 59
End: 2024-10-15
Payer: COMMERCIAL

## 2024-10-15 DIAGNOSIS — R05.1 ACUTE COUGH: Primary | ICD-10-CM

## 2024-10-15 PROCEDURE — 99213 OFFICE O/P EST LOW 20 MIN: CPT | Performed by: FAMILY MEDICINE

## 2024-10-15 RX ORDER — AZITHROMYCIN 250 MG/1
TABLET, FILM COATED ORAL
Qty: 6 TABLET | Refills: 0 | Status: SHIPPED | OUTPATIENT
Start: 2024-10-15 | End: 2024-10-20

## 2024-10-15 NOTE — PROGRESS NOTES
Subjective:   Patient ID: Kika Pires is a 59 year old female.    This visit is conducted using Telemedicine with live, interactive video and audio during this Coronavirus pandemic.    Please note that the following visit was completed using two-way, real-time interactive audio and/or video communication.  This has been done in good leland to provide continuity of care in the best interest of the provider-patient relationship, due to the ongoing public health crisis/national emergency and because of restrictions of visitation.  There are limitations of this visit as no physical exam could be performed.  Every conscious effort was taken to allow for sufficient and adequate time.  This billing was spent on reviewing labs, medications, radiology tests and decision making.  Appropriate medical decision-making and tests are ordered as detailed in the plan of care above    Virtual Telephone Check-In    Kika Pires verbally consents to a Virtual/Telephone Check-In visit on 10/15/24.  Patient has been referred to the [facility] website at [email or web address] to review the yearly Consent to Treat document.    Patient understands and accepts financial responsibility for any deductible, co-insurance and/or co-pays associated with this service.    Duration of the service: 5 minutes      Summary of topics discussed: sore throat    Pt presents with cold symptoms for several days. Pt has had loose cough, sore throat. No fevers.   Pt has tried otc remedies with some relief.   Pt states sick contacts through work/ school.         Zac Hercules MD                  History/Other:   Review of Systems   Constitutional:  Negative for fever.   HENT:  Positive for sore throat. Negative for congestion, ear pain, postnasal drip and rhinorrhea.    Respiratory:  Positive for cough. Negative for shortness of breath.    Cardiovascular:  Negative for chest pain.     Current Outpatient Medications   Medication Sig Dispense Refill     azithromycin (ZITHROMAX Z-HOSEA) 250 MG Oral Tab Take 2 tablets (500 mg total) by mouth daily for 1 day, THEN 1 tablet (250 mg total) daily for 4 days. 6 tablet 0    valACYclovir 1 G Oral Tab TAKE 1 TABLET BY MOUTH TWICE DAILY FOR 10 DAYS 20 tablet 5    Fezolinetant (VEOZAH) 45 MG Oral Tab Take 45 mg by mouth daily. 90 tablet 1    nitrofurantoin monohydrate macro 100 MG Oral Cap Take 1 tablet by mouth two times a day for 7 days 14 capsule 0    phenazopyridine 200 MG Oral Tab Take 1 tablet every 8 hours with food, as needed for urinary burning. 6 tablet 0    nirmatrelvir-ritonavir (PAXLOVID, 300/100,) 300-100 MG Oral Tablet Therapy Pack Take by mouth twice daily for 5 days as directed on dose pack 1 each 0    amoxicillin 875 MG Oral Tab Take 1 tablet (875 mg total) by mouth 2 (two) times daily. 20 tablet 0    venlafaxine ER 37.5 MG Oral Capsule SR 24 Hr Take 1 capsule (37.5 mg total) by mouth daily. And wean as directed-Can reduce venlafaxine ER to 37.5mg daily for 1-2 week. Then take 37.5 mg every other day for 1-2 weeks and then stop. Will send script for lower dose of venlafaxine to pharmacy to wean. 30 capsule 0    Vitamin C 500 MG Oral Tab Take 1 tablet (500 mg total) by mouth daily.       Allergies:Allergies[1]    Objective:   Physical Exam  Constitutional:       Appearance: Normal appearance.   Pulmonary:      Effort: Pulmonary effort is normal.      Comments: Pt is breathing comfortable over the video and speaking in full sentences without shortness of breath      Neurological:      Mental Status: She is alert.   Psychiatric:         Mood and Affect: Mood normal.         Behavior: Behavior normal.         Assessment & Plan:   1. Acute cough with sore throat  - After discussion with patient, zithromax as directed; Over the counter remedies discussed; To call if worse or not better; Follow up in one week if not resolved or as needed if worse. Note for work provided   VIDEO VISIT done.           No orders of the  defined types were placed in this encounter.      Meds This Visit:  Requested Prescriptions     Signed Prescriptions Disp Refills    azithromycin (ZITHROMAX Z-HOSEA) 250 MG Oral Tab 6 tablet 0     Sig: Take 2 tablets (500 mg total) by mouth daily for 1 day, THEN 1 tablet (250 mg total) daily for 4 days.       Imaging & Referrals:  None         [1] No Known Allergies

## 2024-11-05 ENCOUNTER — OFFICE VISIT (OUTPATIENT)
Dept: ENDOCRINOLOGY CLINIC | Facility: CLINIC | Age: 59
End: 2024-11-05
Payer: COMMERCIAL

## 2024-11-05 VITALS
HEIGHT: 66 IN | HEART RATE: 78 BPM | SYSTOLIC BLOOD PRESSURE: 137 MMHG | WEIGHT: 160 LBS | DIASTOLIC BLOOD PRESSURE: 73 MMHG | BODY MASS INDEX: 25.71 KG/M2

## 2024-11-05 DIAGNOSIS — N95.1 HOT FLASHES DUE TO MENOPAUSE: Primary | ICD-10-CM

## 2024-11-05 PROCEDURE — 99214 OFFICE O/P EST MOD 30 MIN: CPT | Performed by: INTERNAL MEDICINE

## 2024-11-05 PROCEDURE — 3008F BODY MASS INDEX DOCD: CPT | Performed by: INTERNAL MEDICINE

## 2024-11-05 PROCEDURE — 3075F SYST BP GE 130 - 139MM HG: CPT | Performed by: INTERNAL MEDICINE

## 2024-11-05 PROCEDURE — 3078F DIAST BP <80 MM HG: CPT | Performed by: INTERNAL MEDICINE

## 2024-11-05 RX ORDER — FEZOLINETANT 45 MG/1
45 TABLET, FILM COATED ORAL
Qty: 90 TABLET | Refills: 1 | Status: SHIPPED | OUTPATIENT
Start: 2024-11-05 | End: 2025-02-03

## 2024-11-05 NOTE — PROGRESS NOTES
Follow-up - Reason for Visit:  Hot Flashes.  Requesting Physician: Dr. Hercules.    CHIEF COMPLAINT:    Chief Complaint   Patient presents with    Follow - Up        HISTORY OF PRESENT ILLNESS:   Kika Pires is a 59 year old female who presents with hot flashes. She was diagnosed with breast cancer and had a total hysterectomy. Her primary care physician started her on venlafaxine. This had been helping a little bit, but did not make her feel 100%. A few visits ago, we started her on Veozah.     At the last visit, she had been feeling much better. However, she was concerned about inability to lose weight.     PAST MEDICAL HISTORY:   Past Medical History:    Cancer (HCC)    Left Breast     Diverticulitis    Family history of breast cancer    with pt's mom and sister    Hx of motion sickness    PONV (postoperative nausea and vomiting)    Postmenopause    pot shira at age 48    Visual impairment    wear glasses       PAST SURGICAL HISTORY:   Past Surgical History:   Procedure Laterality Date    Colonoscopy N/A 2014    @ [facility]    Colonoscopy      Colonoscopy N/A 05/29/2021    Procedure: COLONOSCOPY;  Surgeon: Nicholas Arzola MD;  Location: [facility] ENDOSCOPY    Egd N/A     Hysterectomy  10/11/2022    Mastectomy left  11/16/2020    Lymph Node x1 removed    Mastectomy right      November 16th, 2020    Needle biopsy left Left 05/2018    benign    Needle biopsy right Right 05/2018    benign    Other N/A 2000    vocal chord surgery- extra/benign tissue       SOCIAL HISTORY:    Social History     Socioeconomic History    Marital status:     Number of children: 4   Occupational History    Occupation: teacher   Tobacco Use    Smoking status: Former     Types: Cigarettes    Smokeless tobacco: Never    Tobacco comments:     quit 1985   Vaping Use    Vaping status: Never Used   Substance and Sexual Activity    Alcohol use: Never     Alcohol/week: 0.0 standard drinks of alcohol    Drug use: No    Sexual activity: Yes    Other Topics Concern    Pt has a pacemaker No    Pt has a defibrillator No    Reaction to local anesthetic No    Blood Transfusions No   Social History Narrative    No H/O abuse        FAMILY HISTORY:   Family History   Problem Relation Age of Onset    Breast Cancer Mother 75    Cancer Mother     Other (hypothyroidism) Mother     Breast Cancer Sister 45    No Known Problems Father     No Known Problems Daughter     No Known Problems Son     No Known Problems Daughter     No Known Problems Daughter        CURRENT MEDICATIONS:    Current Outpatient Medications   Medication Sig Dispense Refill    Fezolinetant (VEOZAH) 45 MG Oral Tab Take 45 mg by mouth daily. 90 tablet 1    valACYclovir 1 G Oral Tab TAKE 1 TABLET BY MOUTH TWICE DAILY FOR 10 DAYS 20 tablet 5    nitrofurantoin monohydrate macro 100 MG Oral Cap Take 1 tablet by mouth two times a day for 7 days 14 capsule 0    phenazopyridine 200 MG Oral Tab Take 1 tablet every 8 hours with food, as needed for urinary burning. 6 tablet 0    nirmatrelvir-ritonavir (PAXLOVID, 300/100,) 300-100 MG Oral Tablet Therapy Pack Take by mouth twice daily for 5 days as directed on dose pack 1 each 0    amoxicillin 875 MG Oral Tab Take 1 tablet (875 mg total) by mouth 2 (two) times daily. 20 tablet 0    venlafaxine ER 37.5 MG Oral Capsule SR 24 Hr Take 1 capsule (37.5 mg total) by mouth daily. And wean as directed-Can reduce venlafaxine ER to 37.5mg daily for 1-2 week. Then take 37.5 mg every other day for 1-2 weeks and then stop. Will send script for lower dose of venlafaxine to pharmacy to wean. 30 capsule 0    Vitamin C 500 MG Oral Tab Take 1 tablet (500 mg total) by mouth daily.         ALLERGIES:  No Known Allergies      ASSESSMENTS:     REVIEW OF SYSTEMS:  Constitutional: Negative for: weight change, fever, fatigue, cold/heat intolerance  Eyes: Negative for:  Visual changes, proptosis, blurring  ENT: Negative for:  dysphagia, neck swelling, dysphonia  Respiratory: Negative  for:  dyspnea, cough  Cardiovascular: Negative for:  chest pain, palpitations, orthopnea  GI: Negative for:  abdominal pain, nausea, vomiting, diarrhea, constipation, bleeding  Neurology: Negative for: headache, numbness, weakness  Genito-Urinary: Negative for: dysuria, frequency  Psychiatric: Negative for:  depression, anxiety  Hematology/Lymphatics: Negative for: bruising, lower extremity edema  Endocrine: Negative for: polyuria, polydypsia  Skin: Negative for: rash, blister, cellulitis,      PHYSICAL EXAM:   Vitals:    11/05/24 1457   BP: 137/73   Pulse: 78   Weight: 160 lb (72.6 kg)   Height: 5' 6\" (1.676 m)         BMI: Body mass index is 25.82 kg/m².     CONSTITUTIONAL:  awake, alert, cooperative, no apparent distress, and appears stated age  PSYCH: normal affect  EYES:  No proptosis, no ptosis, conjunctiva normal  SKIN:  no bruising or bleeding, no rashes and no lesions  EXTREMITIES: no edema      DATA:   Reviewed    ASSESSMENT AND PLAN: This is a 59 year-old woman with a history of breast cancer here for follow-up of management of hot flashes due to menopause. She has been taking the Veozah and this is working well for her. She is still finding it difficult to lose weight and so I would like to perform a metabolic evaluation.     She will be back in three months with blood tests.    Prior to this encounter, I spent over 15 minutes with preparing for the visit, including reviewing documents from other specialties as well as from PCP and going over test results and imaging studies. During the face to face encounter, I spent an additional 15 minutes which were determined for follow-up. Greater than 50% of the time was spent in counseling, anticipatory guidance, and coordination of care. Patient concerns were answered to the best of my knowledge.         11/05/24  Twyla Shepherd MD

## 2024-11-14 ENCOUNTER — TELEPHONE (OUTPATIENT)
Dept: ENDOCRINOLOGY CLINIC | Facility: CLINIC | Age: 59
End: 2024-11-14

## 2024-11-14 NOTE — TELEPHONE ENCOUNTER
Current Outpatient Medications   Medication Sig Dispense Refill    Fezolinetant (VEOZAH) 45 MG Oral Tab Take 45 mg by mouth once daily. 90 tablet 1     Key:[id number]

## 2024-11-16 NOTE — TELEPHONE ENCOUNTER
Medication PA Requested: Veozah 45 mg                                                         CoverMyMeds Used:  Key: [id number]   Quantity: 90  Day Supply: 90  Sig: Take 1 tablet by mouth daily  DX Code:  N95.1                                    CPT code (if applicable):   Case Number/Pending Ref#:

## 2024-11-27 NOTE — TELEPHONE ENCOUNTER
Medication PA Requested: Veozah 45 mg                                                         CoverMyMeds Used: No  Key:   Quantity: 90  Day Supply: 90  Sig: Take 1 tablet by mouth daily  DX Code:  N95.1        Electronic prior authorization submitted, last office visit 11/5  Awaiting determination

## 2024-11-29 NOTE — TELEPHONE ENCOUNTER
Per MARIELLA Spring has been approved.     Prior authorization approved  Payer: [employer] system [facility] Case ID: [id number]    [phone]  Note from payer: The case has been Approved from  [id number] to [id number]    Patient notified.

## 2024-12-03 RX ORDER — VALACYCLOVIR HYDROCHLORIDE 1 G/1
TABLET, FILM COATED ORAL
Qty: 20 TABLET | Refills: 5 | Status: SHIPPED | OUTPATIENT
Start: 2024-12-03

## 2024-12-03 NOTE — TELEPHONE ENCOUNTER
Message noted: Chart reviewed and may refill medication as requested. Prescription sent to listed pharmacy. Pharmacy to notify patient. Pt notified through [employer]

## 2024-12-30 NOTE — TELEPHONE ENCOUNTER
Endocrine Refill protocol for oral medications    Protocol Criteria:  PASSED  Reason: N/A    If below requirement is met, send a 90-day supply with 1 refill per provider protocol.    Verify appointment with Endocrinology completed in the last 6 months or scheduled in the next 3 months.    Last completed office visit: 11/5/2024 Twyla Shepherd MD   Next scheduled Follow up:   Future Appointments   Date Time Provider Department Center   2/5/2025 10:15 AM Twyla Shepherd MD [facility]

## 2025-01-08 RX ORDER — FEZOLINETANT 45 MG/1
1 TABLET, FILM COATED ORAL DAILY
Qty: 90 TABLET | Refills: 1 | Status: SHIPPED | OUTPATIENT
Start: 2025-01-08

## 2025-02-01 ENCOUNTER — LAB ENCOUNTER (OUTPATIENT)
Dept: LAB | Age: 60
End: 2025-02-01
Attending: FAMILY MEDICINE
Payer: COMMERCIAL

## 2025-02-01 DIAGNOSIS — R53.83 FATIGUE, UNSPECIFIED TYPE: ICD-10-CM

## 2025-02-01 LAB
ALBUMIN SERPL-MCNC: 4.9 G/DL (ref 3.2–4.8)
ALBUMIN/GLOB SERPL: 1.6 {RATIO} (ref 1–2)
ALP LIVER SERPL-CCNC: 83 U/L
ALT SERPL-CCNC: 12 U/L
ANION GAP SERPL CALC-SCNC: 9 MMOL/L (ref 0–18)
AST SERPL-CCNC: 17 U/L (ref ?–34)
BILIRUB SERPL-MCNC: 0.5 MG/DL (ref 0.2–1.1)
BUN BLD-MCNC: 10 MG/DL (ref 9–23)
BUN/CREAT SERPL: 11.5 (ref 10–20)
CALCIUM BLD-MCNC: 9.7 MG/DL (ref 8.7–10.4)
CHLORIDE SERPL-SCNC: 106 MMOL/L (ref 98–112)
CHOLEST SERPL-MCNC: 256 MG/DL (ref ?–200)
CO2 SERPL-SCNC: 29 MMOL/L (ref 21–32)
CREAT BLD-MCNC: 0.87 MG/DL
DEPRECATED HBV CORE AB SER IA-ACNC: 142 NG/ML
EGFRCR SERPLBLD CKD-EPI 2021: 76 ML/MIN/1.73M2 (ref 60–?)
EST. AVERAGE GLUCOSE BLD GHB EST-MCNC: 126 MG/DL (ref 68–126)
FASTING PATIENT LIPID ANSWER: YES
FASTING STATUS PATIENT QL REPORTED: YES
GLOBULIN PLAS-MCNC: 3 G/DL (ref 2–3.5)
GLUCOSE BLD-MCNC: 87 MG/DL (ref 70–99)
HBA1C MFR BLD: 6 % (ref ?–5.7)
HDLC SERPL-MCNC: 64 MG/DL (ref 40–59)
IRON SATN MFR SERPL: 34 %
IRON SERPL-MCNC: 93 UG/DL
LDLC SERPL CALC-MCNC: 170 MG/DL (ref ?–100)
NONHDLC SERPL-MCNC: 192 MG/DL (ref ?–130)
OSMOLALITY SERPL CALC.SUM OF ELEC: 296 MOSM/KG (ref 275–295)
POTASSIUM SERPL-SCNC: 3.9 MMOL/L (ref 3.5–5.1)
PROT SERPL-MCNC: 7.9 G/DL (ref 5.7–8.2)
SODIUM SERPL-SCNC: 144 MMOL/L (ref 136–145)
T3FREE SERPL-MCNC: 3.61 PG/ML (ref 2.4–4.2)
T4 FREE SERPL-MCNC: 1.1 NG/DL (ref 0.8–1.7)
TOTAL IRON BINDING CAPACITY: 272 UG/DL (ref 250–425)
TRANSFERRIN SERPL-MCNC: 217 MG/DL (ref 250–380)
TRIGL SERPL-MCNC: 123 MG/DL (ref 30–149)
TSI SER-ACNC: 0.18 UIU/ML (ref 0.55–4.78)
VIT B12 SERPL-MCNC: 529 PG/ML (ref 211–911)
VIT D+METAB SERPL-MCNC: 26.8 NG/ML (ref 30–100)
VLDLC SERPL CALC-MCNC: 24 MG/DL (ref 0–30)

## 2025-02-01 PROCEDURE — 83525 ASSAY OF INSULIN: CPT

## 2025-02-01 PROCEDURE — 84443 ASSAY THYROID STIM HORMONE: CPT | Performed by: INTERNAL MEDICINE

## 2025-02-01 PROCEDURE — 83036 HEMOGLOBIN GLYCOSYLATED A1C: CPT

## 2025-02-01 PROCEDURE — 84466 ASSAY OF TRANSFERRIN: CPT | Performed by: INTERNAL MEDICINE

## 2025-02-01 PROCEDURE — 83540 ASSAY OF IRON: CPT | Performed by: INTERNAL MEDICINE

## 2025-02-01 PROCEDURE — 80053 COMPREHEN METABOLIC PANEL: CPT

## 2025-02-01 PROCEDURE — 82607 VITAMIN B-12: CPT | Performed by: INTERNAL MEDICINE

## 2025-02-01 PROCEDURE — 82728 ASSAY OF FERRITIN: CPT | Performed by: INTERNAL MEDICINE

## 2025-02-01 PROCEDURE — 84439 ASSAY OF FREE THYROXINE: CPT | Performed by: INTERNAL MEDICINE

## 2025-02-01 PROCEDURE — 84481 FREE ASSAY (FT-3): CPT | Performed by: INTERNAL MEDICINE

## 2025-02-01 PROCEDURE — 82306 VITAMIN D 25 HYDROXY: CPT

## 2025-02-01 PROCEDURE — 36415 COLL VENOUS BLD VENIPUNCTURE: CPT | Performed by: INTERNAL MEDICINE

## 2025-02-01 PROCEDURE — 80061 LIPID PANEL: CPT

## 2025-02-02 LAB — INSULIN SERPL-ACNC: 5.4 MU/L (ref 3–25)

## 2025-02-05 ENCOUNTER — OFFICE VISIT (OUTPATIENT)
Dept: ENDOCRINOLOGY CLINIC | Facility: CLINIC | Age: 60
End: 2025-02-05
Payer: COMMERCIAL

## 2025-02-05 VITALS
BODY MASS INDEX: 25.71 KG/M2 | HEART RATE: 67 BPM | DIASTOLIC BLOOD PRESSURE: 72 MMHG | WEIGHT: 160 LBS | HEIGHT: 66 IN | SYSTOLIC BLOOD PRESSURE: 126 MMHG

## 2025-02-05 DIAGNOSIS — R79.89 LOW TSH LEVEL: ICD-10-CM

## 2025-02-05 DIAGNOSIS — E78.5 DYSLIPIDEMIA: ICD-10-CM

## 2025-02-05 DIAGNOSIS — E55.9 VITAMIN D DEFICIENCY: ICD-10-CM

## 2025-02-05 DIAGNOSIS — E53.8 VITAMIN B12 DEFICIENCY: ICD-10-CM

## 2025-02-05 DIAGNOSIS — R73.03 PRE-DIABETES: ICD-10-CM

## 2025-02-05 DIAGNOSIS — N95.1 HOT FLASHES DUE TO MENOPAUSE: ICD-10-CM

## 2025-02-05 DIAGNOSIS — R63.5 WEIGHT GAIN: Primary | ICD-10-CM

## 2025-02-05 PROCEDURE — 3074F SYST BP LT 130 MM HG: CPT | Performed by: INTERNAL MEDICINE

## 2025-02-05 PROCEDURE — 99214 OFFICE O/P EST MOD 30 MIN: CPT | Performed by: INTERNAL MEDICINE

## 2025-02-05 PROCEDURE — 3078F DIAST BP <80 MM HG: CPT | Performed by: INTERNAL MEDICINE

## 2025-02-05 PROCEDURE — 3008F BODY MASS INDEX DOCD: CPT | Performed by: INTERNAL MEDICINE

## 2025-02-05 NOTE — PROGRESS NOTES
Follow-up - Reason for Visit:  Hot Flashes.  Requesting Physician: Dr. Hercules.    CHIEF COMPLAINT:    No chief complaint on file.       HISTORY OF PRESENT ILLNESS:   Kika Pires is a 59 year old female who initially presented with hot flashes. She was diagnosed with breast cancer and had a total hysterectomy. Her primary care physician started her on venlafaxine. This had been helping a little bit, but did not make her feel 100%. A few visits ago, we started her on Veozah.     At the last visit, she had been feeling much better. However, she was concerned about inability to lose weight.     She started gaining weight after the breast surgery and hysterectomy.     At the last visit, we decided to perform a complete evaluation to determine changes that can be made to help her with this.     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 She is also a .     PAST MEDICAL HISTORY:   Past Medical History:    Cancer (HCC)    Left Breast     Diverticulitis    Family history of breast cancer    with pt's mom and sister    Hx of motion sickness    PONV (postoperative nausea and vomiting)    Postmenopause    pot shira at age 48    Visual impairment    wear glasses       PAST SURGICAL HISTORY:   Past Surgical History:   Procedure Laterality Date    Colonoscopy N/A 2014    @ [facility]    Colonoscopy      Colonoscopy N/A 05/29/2021    Procedure: COLONOSCOPY;  Surgeon: Nicholas Arzola MD;  Location: [facility] ENDOSCOPY    Egd N/A     Hysterectomy  10/11/2022    Mastectomy left  11/16/2020    Lymph Node x1 removed    Mastectomy right      November 16th, 2020    Needle biopsy left Left 05/2018    benign    Needle biopsy right Right 05/2018    benign    Other N/A 2000    vocal chord surgery- extra/benign tissue       SOCIAL HISTORY:    Social History     Socioeconomic History    Marital status:     Number of children: 4   Occupational History     Occupation: teacher   Tobacco Use    Smoking status: Former     Types: Cigarettes    Smokeless tobacco: Never    Tobacco comments:     quit 1985   Vaping Use    Vaping status: Never Used   Substance and Sexual Activity    Alcohol use: Never     Alcohol/week: 0.0 standard drinks of alcohol    Drug use: No    Sexual activity: Yes   Other Topics Concern    Pt has a pacemaker No    Pt has a defibrillator No    Reaction to local anesthetic No    Blood Transfusions No   Social History Narrative    No H/O abuse        FAMILY HISTORY:   Family History   Problem Relation Age of Onset    Breast Cancer Mother 75    Cancer Mother     Other (hypothyroidism) Mother     Breast Cancer Sister 45    No Known Problems Father     No Known Problems Daughter     No Known Problems Son     No Known Problems Daughter     No Known Problems Daughter        CURRENT MEDICATIONS:    Current Outpatient Medications   Medication Sig Dispense Refill    VEOZAH 45 MG Oral Tab TAKE 1 TABLET BY MOUTH DAILY 90 tablet 1    VALACYCLOVIR 1 G Oral Tab TAKE 1 TABLET BY MOUTH TWICE DAILY FOR 10 DAYS 20 tablet 5    Fezolinetant (VEOZAH) 45 MG Oral Tab Take 45 mg by mouth once daily. 90 tablet 1    nitrofurantoin monohydrate macro 100 MG Oral Cap Take 1 tablet by mouth two times a day for 7 days 14 capsule 0    phenazopyridine 200 MG Oral Tab Take 1 tablet every 8 hours with food, as needed for urinary burning. 6 tablet 0    nirmatrelvir-ritonavir (PAXLOVID, 300/100,) 300-100 MG Oral Tablet Therapy Pack Take by mouth twice daily for 5 days as directed on dose pack 1 each 0    amoxicillin 875 MG Oral Tab Take 1 tablet (875 mg total) by mouth 2 (two) times daily. 20 tablet 0    venlafaxine ER 37.5 MG Oral Capsule SR 24 Hr Take 1 capsule (37.5 mg total) by mouth daily. And wean as directed-Can reduce venlafaxine ER to 37.5mg daily for 1-2 week. Then take 37.5 mg every other day for 1-2 weeks and then stop. Will send script for lower dose of venlafaxine to pharmacy  to wean. 30 capsule 0    Vitamin C 500 MG Oral Tab Take 1 tablet (500 mg total) by mouth daily.         ALLERGIES:  No Known Allergies      ASSESSMENTS:     REVIEW OF SYSTEMS:  Constitutional: Negative for: weight change, fever, fatigue, cold/heat intolerance  Eyes: Negative for:  Visual changes, proptosis, blurring  ENT: Negative for:  dysphagia, neck swelling, dysphonia  Respiratory: Negative for:  dyspnea, cough  Cardiovascular: Negative for:  chest pain, palpitations, orthopnea  GI: Negative for:  abdominal pain, nausea, vomiting, diarrhea, constipation, bleeding  Neurology: Negative for: headache, numbness, weakness  Genito-Urinary: Negative for: dysuria, frequency  Psychiatric: Negative for:  depression, anxiety  Hematology/Lymphatics: Negative for: bruising, lower extremity edema  Endocrine: Negative for: polyuria, polydypsia  Skin: Negative for: rash, blister, cellulitis,      PHYSICAL EXAM:   Vitals:    02/05/25 1016   BP: 126/72   Pulse: 67   Weight: 160 lb (72.6 kg)   Height: 5' 6\" (1.676 m)           BMI: Body mass index is 25.82 kg/m².     CONSTITUTIONAL:  awake, alert, cooperative, no apparent distress, and appears stated age  PSYCH: normal affect  EYES:  No proptosis, no ptosis, conjunctiva normal  SKIN:  no bruising or bleeding, no rashes and no lesions  EXTREMITIES: no edema      DATA:   02/01/25:  Vitamin D- 26.8  Vitamin B12- 529  HbA1c- 6.0  HDL- 64  TGL- 123  LDL- 170  Iron Studies- wnl  TSH- 0.185  FT4- 1.1  FBS- 87  Insulin- 5.4  Coty Ca- 8.98      ASSESSMENT AND PLAN: This is a 59 year-old woman with a history of breast cancer here for follow-up of management of hot flashes due to menopause, as well as weight gain. She has been taking the Veozah and this is working well for her. She is still finding it difficult to lose weight and so I had performed a hormonal and metabolic evaluation.     1.) Vitamin D and B12 deficiency  - Start Vitamin D 2,000 international units daily  - Start taking a  multivitamin daily  - Check vitamin D and vitamin B12 3 months after she sees the dietitian    2.) Pre-diabetes and dyslipidemia  - She will be meeting with the dietitian  - Check HbA1c and lipid panel 3 months after meeting with dietitian  - She will also start the exercise program    3.) Low TSH- she has a history of this and so I would like to repeat these as well as check a thyroid US    4.) Menopausal Symptoms- continue Veozah (LFTs with normal limits)      She will be back in three months after meeting with dietitian    Prior to this encounter, I spent over 15 minutes with preparing for the visit, including reviewing documents from other specialties as well as from PCP and going over test results and imaging studies. During the face to face encounter, I spent an additional 15 minutes which were determined for follow-up. Greater than 50% of the time was spent in counseling, anticipatory guidance, and coordination of care. Patient concerns were answered to the best of my knowledge.         2/05/25  Twyla Shepherd MD

## 2025-08-11 ENCOUNTER — E-VISIT (OUTPATIENT)
Dept: TELEHEALTH | Age: 60
End: 2025-08-11

## 2025-08-11 DIAGNOSIS — B96.89 BACTERIAL VAGINOSIS: Primary | ICD-10-CM

## 2025-08-11 DIAGNOSIS — N76.0 BACTERIAL VAGINOSIS: Primary | ICD-10-CM

## 2025-08-11 RX ORDER — METRONIDAZOLE 500 MG/1
500 TABLET ORAL 2 TIMES DAILY
Qty: 14 TABLET | Refills: 0 | Status: SHIPPED | OUTPATIENT
Start: 2025-08-11 | End: 2025-08-18

## 2025-08-24 ENCOUNTER — TELEPHONE (OUTPATIENT)
Dept: FAMILY MEDICINE CLINIC | Facility: CLINIC | Age: 60
End: 2025-08-24

## (undated) DIAGNOSIS — N76.0 BACTERIAL VAGINOSIS: Primary | ICD-10-CM

## (undated) DIAGNOSIS — B96.89 BACTERIAL VAGINOSIS: Primary | ICD-10-CM

## (undated) DEVICE — SUTURE PDS II 2-0 CT-2

## (undated) DEVICE — MAJOR GENERAL: Brand: MEDLINE INDUSTRIES, INC.

## (undated) DEVICE — FLEXIBLE YANKAUER,MEDIUM TIP, NO VACUUM CONTROL: Brand: ARGYLE

## (undated) DEVICE — CASED DISP BIPOLAR CORD

## (undated) DEVICE — DERMABOND LIQUID ADHESIVE

## (undated) DEVICE — BLAKE SILICONE DRAIN, 15 FR ROUND, HUBLESS WITH 3/16" TROCAR: Brand: BLAKE

## (undated) DEVICE — TOWEL OR BLU 16X26 STRL

## (undated) DEVICE — LAPAROTOMY: Brand: MEDLINE INDUSTRIES, INC.

## (undated) DEVICE — SUTURE MONOCRYL 4-0 PS-2

## (undated) DEVICE — INSUFFLATION NEEDLE TO ESTABLISH PNEUMOPERITONEUM.: Brand: INSUFFLATION NEEDLE

## (undated) DEVICE — SUTURE VICRYL 3-0 SH

## (undated) DEVICE — ADHESIVE MASTISOL 2/3CC VL

## (undated) DEVICE — SPONGE: SPECIALTY PEANUT XR 100/CS: Brand: MEDICAL ACTION INDUSTRIES

## (undated) DEVICE — SIZER BREAST IMPLANT FP 560CC

## (undated) DEVICE — SUTURE PDS II 4-0 SH

## (undated) DEVICE — DEVICE FAT TISSUE COLL REVOLVE

## (undated) DEVICE — 35 ML SYRINGE REGULAR TIP: Brand: MONOJECT

## (undated) DEVICE — BANDAGE ROLL,100% COTTON, 6 PLY, LARGE: Brand: KERLIX

## (undated) DEVICE — 3M™ STERI-STRIP™ REINFORCED ADHESIVE SKIN CLOSURES, R1548, 1 IN X 5 IN (25 MM X 125 MM), 4 STRIPS/ENVELOPE: Brand: 3M™ STERI-STRIP™

## (undated) DEVICE — ENCORE® LATEX MICRO SIZE 6.5, STERILE LATEX POWDER-FREE SURGICAL GLOVE: Brand: ENCORE

## (undated) DEVICE — SUTURE SILK 2-0 SA65H

## (undated) DEVICE — SOLUTION  .9 3000ML

## (undated) DEVICE — CONTAINER SPEC STR 4OZ GRY LID

## (undated) DEVICE — DRAIN RESERVOIR RELIAVAC 100CC

## (undated) DEVICE — SPONGE LAP 18X18 XRAY STRL

## (undated) DEVICE — ENCORE® LATEX ACCLAIM SIZE 6.5, STERILE LATEX POWDER-FREE SURGICAL GLOVE: Brand: ENCORE

## (undated) DEVICE — SUTURE ETHILON 3-0 669H

## (undated) DEVICE — TRI-LUMEN FILTERED TUBE SET WITH ACTIVATED CHARCOAL FILTER: Brand: AIRSEAL

## (undated) DEVICE — SOL  .9 1000ML BTL

## (undated) DEVICE — BRA SURG ELIZ PINK M

## (undated) DEVICE — ROBOTIC: Brand: MEDLINE INDUSTRIES, INC.

## (undated) DEVICE — SUTURE VLOC 180 0 12" 0316

## (undated) DEVICE — VCARE MEDIUM, UTERINE MANIPULATOR, VAGINAL-CERVICAL-AHLUWALIA'S-RETRACTOR-ELEVATOR: Brand: VCARE

## (undated) DEVICE — 1010 S-DRAPE TOWEL DRAPE 10/BX: Brand: STERI-DRAPE™

## (undated) DEVICE — Device: Brand: MICROAIRE®

## (undated) DEVICE — COVER SGL STRL LGHT HNDL BLU

## (undated) DEVICE — SUTURE VICRYL 2-0 SH

## (undated) DEVICE — COLUMN DRAPE

## (undated) DEVICE — LINE MNTR ADLT SET O2 INTMD

## (undated) DEVICE — GAMMEX® PI HYBRID SIZE 7.5, STERILE POWDER-FREE SURGICAL GLOVE, POLYISOPRENE AND NEOPRENE BLEND: Brand: GAMMEX

## (undated) DEVICE — BRA SURG ELIZ PINK L

## (undated) DEVICE — SYSTEM SURGYPAD VELCRO 36IN

## (undated) DEVICE — CHLORAPREP 26ML APPLICATOR

## (undated) DEVICE — PLASTC TOOMEY SYRNG DISP

## (undated) DEVICE — 3M™ IOBAN™ 2 ANTIMICROBIAL INCISE DRAPE 6650EZ: Brand: IOBAN™ 2

## (undated) DEVICE — PROXIMATE SKIN STAPLERS (35 WIDE) CONTAINS 35 STAINLESS STEEL STAPLES (FIXED HEAD): Brand: PROXIMATE

## (undated) DEVICE — ABDOMINAL BINDER: Brand: DEROYAL

## (undated) DEVICE — SUCTION CANISTER, 3000CC,SAFELINER: Brand: DEROYAL

## (undated) DEVICE — Device

## (undated) DEVICE — SUT MONOCRYL 4-0 PS-2 Y496G

## (undated) DEVICE — USE ITEM #176901

## (undated) DEVICE — LEGGINGS SRG 48X31IN CUF STRL

## (undated) DEVICE — 6 ML SYRINGE LUER-LOCK TIP: Brand: MONOJECT

## (undated) DEVICE — ENCORE® PERRY STYLE 42 PF SIZE 6.5, STERILE LATEX POWDER-FREE SURGICAL GLOVE: Brand: ENCORE

## (undated) DEVICE — TRAP MCS 40ML 5IN PLS SCR CAP

## (undated) DEVICE — Device: Brand: JELCO

## (undated) DEVICE — VESSEL SEALER EXTEND: Brand: ENDOWRIST

## (undated) DEVICE — CLIP MED INTNL HMCLP TNTLM

## (undated) DEVICE — MINOR GENERAL: Brand: MEDLINE INDUSTRIES, INC.

## (undated) DEVICE — TIP COVER ACCESSORY

## (undated) DEVICE — SUPER SPONGES,MEDIUM: Brand: KERLIX

## (undated) DEVICE — FENESTRATED BIPOLAR FORCEPS: Brand: ENDOWRIST

## (undated) DEVICE — BETADINE SOL 32 OZ 10% POVIDON

## (undated) DEVICE — 3M™ BAIR HUGGER® UNDERBODY BLANKET, FULL ACCESS, 10 PER CASE 63500: Brand: BAIR HUGGER™

## (undated) DEVICE — 60 ML SYRINGE LUER-LOCK TIP: Brand: MONOJECT

## (undated) DEVICE — UNDYED BRAIDED (POLYGLACTIN 910), SYNTHETIC ABSORBABLE SUTURE: Brand: COATED VICRYL

## (undated) DEVICE — STERILE SURGICAL LUBRICANT, METAL TUBE: Brand: SURGILUBE

## (undated) DEVICE — PROGRASP FORCEPS: Brand: ENDOWRIST

## (undated) DEVICE — Device: Brand: DEFENDO AIR/WATER/SUCTION AND BIOPSY VALVE

## (undated) DEVICE — MEGA NEEDLE DRIVER: Brand: ENDOWRIST

## (undated) DEVICE — NEEDLE 18G 1-1/2 BLUNT FILL

## (undated) DEVICE — STANDARD HYPODERMIC NEEDLE,POLYPROPYLENE HUB: Brand: MONOJECT

## (undated) DEVICE — SNARE OPTMZ PLPCTM TRP

## (undated) DEVICE — TRAY SKIN PREP PVP-1

## (undated) DEVICE — NEEDLE HPO 18GA 1.5IN ECLPS

## (undated) DEVICE — DRESSING FOAM TOPIFOAM

## (undated) DEVICE — CANNULA SEAL

## (undated) DEVICE — INSULATED BLADE ELECTRODE 6.5

## (undated) DEVICE — CLOSURE EXOFIN 1.0ML

## (undated) DEVICE — SNARE ENDOSCOPIC 10MM ROUND

## (undated) DEVICE — 3M™ STERI-STRIP™ REINFORCED ADHESIVE SKIN CLOSURES, R1547, 1/2 IN X 4 IN (12 MM X 100 MM), 6 STRIPS/ENVELOPE: Brand: 3M™ STERI-STRIP™

## (undated) DEVICE — SUTURE SILK 2-0 FS

## (undated) DEVICE — DRESSING BIOPATCH 1X4 CNTR

## (undated) DEVICE — ELECTRO LUBE IS A SINGLE PATIENT USE DEVICE THAT IS INTENDED TO BE USED ON ELECTROSURGICAL ELECTRODES TO REDUCE STICKING.: Brand: KEY SURGICAL ELECTRO LUBE

## (undated) DEVICE — PEN: MARKING STD PT 100/CS: Brand: MEDICAL ACTION INDUSTRIES

## (undated) DEVICE — ASPIRATION TUBING SET, DISPOSABLE: Brand: MICROAIRE®

## (undated) DEVICE — MONOPOLAR CURVED SCISSORS: Brand: ENDOWRIST

## (undated) DEVICE — DRAPE PACK CHEST & U BAR

## (undated) DEVICE — CAUTERY BLADE 2IN INS E1455

## (undated) DEVICE — Device: Brand: CUSTOM PROCEDURE KIT

## (undated) DEVICE — CLIP SM INTNL HMCLP TNTLM ESCP

## (undated) DEVICE — CONTAINER CLEAN 8OZ W/LID

## (undated) DEVICE — MEDI-VAC NON-CONDUCTIVE SUCTION TUBING 6MM X 1.8M (6FT.) L: Brand: CARDINAL HEALTH

## (undated) DEVICE — DRAPE SHEET LAPCHOLE 124X100X7

## (undated) DEVICE — LAPAROVUE VISIBILITY SYSTEM LAPAROSCOPIC SOLUTIONS: Brand: LAPAROVUE

## (undated) DEVICE — CLIP LGT 11MM OPEN 2.8MM 235CM

## (undated) DEVICE — VIOLET BRAIDED (POLYGLACTIN 910), SYNTHETIC ABSORBABLE SUTURE: Brand: COATED VICRYL

## (undated) DEVICE — BLADELESS OBTURATOR: Brand: WECK VISTA

## (undated) DEVICE — COVER PRB NEOGUARD 30X2.6CM US

## (undated) DEVICE — DEV CLOS VLOC 2/0 V 9 V-30

## (undated) DEVICE — TIP BOVIE 4\" MEGADYNE

## (undated) DEVICE — SOLUTION  .9 1000ML BTL

## (undated) DEVICE — FORCEP RADIAL JAW 4

## (undated) DEVICE — PENCAN® 22 GA. X 3-1/2 (90 MM) SPINAL NEEDLE: Brand: PENCAN®

## (undated) DEVICE — ARM DRAPE

## (undated) NOTE — LETTER
3/12/2019          To Whom It May Concern:    Berry Fine is currently under my medical care. Please excuse the patient from work missed as she has been ill. May return to work Wednesday if well.     If you require additional information please contac

## (undated) NOTE — Clinical Note
1/31/2017          To Whom It May Concern:    Bradley De La Fuente is currently under my medical care. Please excuse the patient from work missed as she has been ill. May return to work when well.       If you require additional information please contact our

## (undated) NOTE — LETTER
Lalitha Grey, :3/23/1965    CONSENT FOR PROCEDURE/SEDATION    1. I authorize the performance upon Lalitha Grey  the following: BRENDA    2.  I authorize Dr. Ani Pisano MD (and whomever is designated as the doctor’s assistant), to perform the ab Witness: _________________________________________ Date:___________     Physician Signature: _______________________________ Date:___________

## (undated) NOTE — LETTER
10/16/2019          To Whom It May Concern:    Mala Gil is currently under my medical care. Please excuse the patient from work missed as she has been ill. May return to work when well.     If you require additional information please contact our o

## (undated) NOTE — LETTER
10/24/2017          To Whom It May Concern:    Bradley De La Fuente is currently under my medical care. Please excuse the patient from work missed as she has been ill. May return to work when well.     If you require additional information please contact our o

## (undated) NOTE — LETTER
[address] [place] [address]      Authorization for Surgical Operation and Procedure     Date:___________                                                                                                         Time:_______ 4.   Should the need arise during my operation or immediate post-operative period, I also consent to the administration of blood and/or blood products.   Further, I understand that despite careful testing and screening of blood or blood products by jolene 8.   I recognize that in the event my procedure results in extended X-Ray/fluoroscopy time, I may develop a skin reaction. 9.  If I have a Do Not Attempt Resuscitation (DNAR) order in place, that status will be suspended while in the operating room, proc STATEMENT OF PHYSICIAN My signature below affirms that prior to the time of the procedure; I have explained to the patient and/or his/her legal representative, the risks and benefits involved in the proposed treatment and any reasonable alternative to the

## (undated) NOTE — LETTER
11/7/2023          To Whom It May Concern:    Aris Wong is currently under my medical care. Please excuse the patient from work missed on 11/7- 11/8/23 as the patient has been ill. May return to work on Thursday, 11/9/23. If you require additional information please contact our office.         Sincerely,      Karyle Britain, MD          Document generated by:  Karyle Britain, MD

## (undated) NOTE — LETTER
[address] [place], [place] [address]PHY[place]   Date:   1/13/2022     Name:   Chichi Mercer    YOB: 1965   MRN:   [id number]       WHERE IS YOUR PAIN NOW?   Maximiliano the areas on your body where you feel the described sens

## (undated) NOTE — ED AVS SNAPSHOT
Fiona Wong   MRN: [id number]    Department:  [facility] Emergency Department   Date of Visit:  8/28/2017           Disclosure     Insurance plans vary and the physician(s) referred by the ER may not be covered by your plan.  Please contact y CARE PHYSICIAN AT ONCE OR RETURN IMMEDIATELY TO THE EMERGENCY DEPARTMENT. If you have been prescribed any medication(s), please fill your prescription right away and begin taking the medication(s) as directed.   If you believe that any of the medications

## (undated) NOTE — LETTER
[facility], [place]  Authorization for Invasive Procedures  1.  I hereby authorize Dr. Reyna Prado , my physician and whomever may be designated as the doctor's assistant, to perform the following operation and/or procedure:  Magnet 4. Should the need arise during my operation or immediate post-operative period; I also consent to the administration of blood and/or blood products.  Further, I understand that despite careful testing and screening of blood and blood products, I may still 9. Patients having a sterilization procedure: I understand that if the procedure is successful the results will be permanent and it will therefore be impossible for me to inseminate, conceive or bear children.  I also understand that the procedure is intend

## (undated) NOTE — MR AVS SNAPSHOT
[facility] SPECIALTY [facility] [place] [address] [address]  [id number] [phone]               Thank you for choosing us for your health care visit with Anthoyn Hunter MD.  We are glad to serve you and happy to provide you with this summary of y If you've recently had a stay at the Hospital you can access your discharge instructions in [facility] by going to Visits < Admission Summaries.  If you've been to the Emergency Department or your doctor's office, you can view your past visit information in My

## (undated) NOTE — Clinical Note
Dear Dr. Yolis Benitez,    Thank you for sending Gage Azevedo to see me for physiatry consultation. I appreciate your confidence in me to care for your patients. Please feel free call me with any questions at [phone] or contact me through [facility]     Sincerely,

## (undated) NOTE — LETTER
September 11, 2018         Brando Gay MD  [address] [place], [place] [id number]      Patient: Nidia Galarza   YOB: 1965   Date of Visit: 9/11/2018       Dear Dr. Lauren Hidalgo MD,    I saw your patient, Nidia Galarza, on 9/11/2018.  Encl

## (undated) NOTE — LETTER
4/6/2022          To Whom It May Concern:    Carlos A Landa is currently under my medical care and may not return to work at this time. Please excuse Joeljohn Henrry for 3 days. She currently is being tested for Covid 19; once her test results are received, a return to work date can be established. If you require additional information please contact our office.         Sincerely,      BRENT Richard          Document generated by:  BRENT Richard

## (undated) NOTE — Clinical Note
Dear Dr. Nelly Adame,    I had the opportunity to see your patient Lillian Altamirano recently. I am sending you this update, and I appreciate your confidence in me to care for your patients.  Please feel free call me with any questions at [phone] or contact me th

## (undated) NOTE — LETTER
[address]  [address] [place], [place]      Authorization for Surgical Operation and Procedure     Date:___________                                                                                                         Time:_______ 4.   Should the need arise during my operation or immediate post-operative period, I also consent to the administration of blood and/or blood products.   Further, I understand that despite careful testing and screening of blood or blood products by jolene 8.   I recognize that in the event my procedure results in extended X-Ray/fluoroscopy time, I may develop a skin reaction. 9.  If I have a Do Not Attempt Resuscitation (DNAR) order in place, that status will be suspended while in the operating room, proc STATEMENT OF PHYSICIAN My signature below affirms that prior to the time of the procedure; I have explained to the patient and/or his/her legal representative, the risks and benefits involved in the proposed treatment and any reasonable alternative to the

## (undated) NOTE — LETTER
05/21/18        [address] [facility]      Dear Tirso Mariee records indicate that you have outstanding lab work and or testing that was ordered for you and has not yet been completed:          Lipid Panel [E]      Com

## (undated) NOTE — LETTER
2/21/2018          To Whom It May Concern:    Krzysztof Arizmendi is currently under my medical care. Please excuse the patient from work missed as she has been having some back pains.   May return to work on Monday, 2/26/18    If you require additional informa

## (undated) NOTE — LETTER
12/21/2017          To Whom It May Concern:    Alberto Neumann is currently under my medical care. Please excuse the patient from work missed as she has been ill. May return to work when well.     If you require additional information please contact our o

## (undated) NOTE — LETTER
12/14/2022          To Whom It May Concern:    Becki Song is currently under my medical care and may not return to work at this time. Please excuse Marcia Pena for 3 days. She may return to work on 12/19/22 based on re-evaluation . If you require additional information please contact our office.         Sincerely,      BRENT Miller          Document generated by:  BRENT Miller

## (undated) NOTE — LETTER
[address], [place], [place]     AUTHORIZATION FOR SURGICAL OPERATION OR PROCEDURE    I hereby authorize Dr. Winston Leventhal , my Physician(s) and whomever may be designated as the doctor's Assistant, to perform the following operatio 4. I consent to the photographing of procedure(s) to be performed for the purposes of advancing medicine, science and/or education, provided my identity is not revealed.  If the procedure has been videotaped, the physician/surgeon will obtain the original v (Witness signature)                                                                                                  (Date)                                (Time)  STATEMENT OF PHYSICIAN My signature below affirms that prior to the time of the procedure;  I

## (undated) NOTE — LETTER
[facility]  [facility], [address] [place] [id number]  [facility]: [phone]  FAX: [address], [place]  [address] [address]  [facility] [place]    The patient listed

## (undated) NOTE — LETTER
2/17/2020    [address]        [place] [address]            Dear Jacqueline Crook,      [address] [facility] records indicate that you are due for an appointment for a Colonoscopy in March 2020, or shortly there after, with Robina Austin

## (undated) NOTE — MR AVS SNAPSHOT
After Visit Summary   4/21/2021    Jacqueline Crook    MRN: [id number]           Visit Information     Date & Time  4/21/2021 11:15 AM Provider  Ernesto Jeffers, [address] [facility] [place] [facility].  Phone  [phone] [email or web address]                   DO YOU KNOW WHERE TO GO? Injury & Illness are never convenient. If you are dealing with a   non-emergency, consider your options before heading to an ER.   VIDEO VISITS  Visit face-to-face with [phone] ([phone])

## (undated) NOTE — LETTER
[address]  [address], [place], [place]     AUTHORIZATION FOR SURGICAL OPERATION OR PROCEDURE    I hereby authorize Dr. Kulwinder Cha my Physician(s) and whomever may be designated as the doctor's Assistant, to perform the following operation 4. I consent to the photographing of procedure(s) to be performed for the purposes of advancing medicine, science and/or education, provided my identity is not revealed.  If the procedure has been videotaped, the physician/surgeon will obtain the original v (Witness signature)                                                                                                  (Date)                                (Time)  STATEMENT OF PHYSICIAN My signature below affirms that prior to the time of the procedure;  I

## (undated) NOTE — LETTER
10/15/2024          To Whom It May Concern:    Kika Pires is currently under my medical care.  Please excuse the patient from work missed as the patient has been ill.   May return to work on Thursday, 10/17/24  If you require additional information please contact our office.        Sincerely,      Zac Hercules MD          Document generated by:  Zac Hercules MD

## (undated) NOTE — LETTER
[address] [place], [place] [address]   Date:   9/27/2021     Name:   Jacinta Alvarez    YOB: 1965   MRN:   [id number]       WHERE IS YOUR PAIN NOW?   Maximiliano the areas on your body where you feel the described sens

## (undated) NOTE — LETTER
[address] [place], [place] [address]   Date:   10/25/2021     Name:   Sundar Babin    YOB: 1965   MRN:   [id number]       WHERE IS YOUR PAIN NOW?   Maximiliano the areas on your body where you feel the described sen